# Patient Record
Sex: MALE | Race: WHITE | NOT HISPANIC OR LATINO | Employment: OTHER | URBAN - METROPOLITAN AREA
[De-identification: names, ages, dates, MRNs, and addresses within clinical notes are randomized per-mention and may not be internally consistent; named-entity substitution may affect disease eponyms.]

---

## 2017-01-09 ENCOUNTER — ALLSCRIPTS OFFICE VISIT (OUTPATIENT)
Dept: OTHER | Facility: OTHER | Age: 72
End: 2017-01-09

## 2017-01-09 DIAGNOSIS — R01.1 CARDIAC MURMUR: ICD-10-CM

## 2017-01-12 ENCOUNTER — HOSPITAL ENCOUNTER (OUTPATIENT)
Dept: NON INVASIVE DIAGNOSTICS | Facility: HOSPITAL | Age: 72
Discharge: HOME | End: 2017-01-12
Attending: INTERNAL MEDICINE
Payer: MEDICARE

## 2017-01-12 DIAGNOSIS — R01.1 CARDIAC MURMUR: ICD-10-CM

## 2017-01-12 PROCEDURE — 93306 TTE W/DOPPLER COMPLETE: CPT

## 2017-01-13 ENCOUNTER — GENERIC CONVERSION - ENCOUNTER (OUTPATIENT)
Dept: OTHER | Facility: OTHER | Age: 72
End: 2017-01-13

## 2017-12-06 ENCOUNTER — ALLSCRIPTS OFFICE VISIT (OUTPATIENT)
Dept: OTHER | Facility: OTHER | Age: 72
End: 2017-12-06

## 2017-12-06 DIAGNOSIS — I10 ESSENTIAL (PRIMARY) HYPERTENSION: ICD-10-CM

## 2017-12-06 DIAGNOSIS — E78.5 HYPERLIPIDEMIA: ICD-10-CM

## 2017-12-07 NOTE — PROGRESS NOTES
Assessment  Assessed    1  HTN (hypertension) (401 9) (I10)   2  Hyperlipidemia (272 4) (E78 5)   3  H/O pancreatic cancer (V10 09) (Z85 07)   4  Cardiac murmur (785 2) (R01 1)    Plan  Cardiac murmur, HTN (hypertension)    · EKG/ECG- POC; Status:Complete;   Done: 20ZTG8727   Perform: In Office; [phone]; Last Updated By:Carolee Hawthorne; 12/6/2017 3:01:26 PM;Ordered; For:Cardiac murmur, HTN (hypertension); Ordered By:Andrei Small;  HTN (hypertension)    · AmLODIPine Besylate 5 MG Oral Tablet; TAKE 1 TABLET DAILY   Rx By: Payal Bronson; Dispense: 90 Days ; #:90 Tablet; Refill: 3;HTN (hypertension); JANICE = N; Verified Transmission to [address] [place] [address] [address]; Last Updated By: System, [employer]; 12/6/2017 3:22:12 PM   · Follow-up visit in 3 months Evaluation and Treatment  Follow-up  Status: Complete Done: [id number]   Ordered; For: HTN (hypertension); Ordered By: Payal Bronson Performed:  Due: 97LFX2449; Last Updated By: Malena Manzanares; 12/6/2017 4:25:48 PM  HTN (hypertension), Hyperlipidemia    · (1) CBC/ PLT (NO DIFF); Status:Active; Requested for:81Ljg9250;    Perform:[facility]; ZBX:40TXZ2555; Ordered;For:HTN (hypertension), Hyperlipidemia; Ordered By:Andrei Small;   · (1) COMPREHENSIVE METABOLIC PANEL; Status:Active; Requested for:67Dre5597;    Perform:[facility]; RML:33FAC6656; Ordered;For:HTN (hypertension), Hyperlipidemia; Ordered By:Andrei Small;   · (1) LIPID PANEL, FASTING; Status:Active; Requested for:71Xgy8862;    Perform:[facility]; PHX:94ZQP2596; Ordered;For:HTN (hypertension), Hyperlipidemia; Ordered By:Small, Narpinder;   · (1) TSH; Status:Active; Requested for:96Xnp6899;    Perform:[facility]; HZS:64PZS6782; Ordered;(hypertension), Hyperlipidemia; Ordered By:Daniel Small;    Discussion/Summary  Cardiology Discussion Summary Free Text Note Form [facility]:   1  History of pancreatic cancer status post Whipple procedure  Follows up at [facility] murmur with repeat echo in January showing normal LV systolic function mild AI mild MR mild TR  No change from previous studies  Palpitations and hypertension  Seems to be pretty well controlled with low-dose metoprololDyslipidemia  Patient is on simvastatin followed up by PMD  Check fasting lipids and LFTsHistory of BPH on FlomaxHypertension  Blood pressure checked by me was around 160/80  Earlier checked by stop was around 150  Patient was started on amlodipine  Will start with 2  5 mg daily For 7 days then slowly titrate up  He will call us if there is any problems  Will check routine lab including CMP CBC and TSHcurrent Rx  Will get an echocardiogram and follow-up in 6 months  Patient's Capacity to Self-Care: Patient is able to Self-Care  Medication SE Review and Pt Understands Tx: Possible side effects of new medications were reviewed with the patient/guardian today  Counseling Documentation With Imm: The patient was counseled regarding diagnostic results,-- instructions for management,-- risk factor reductions,-- prognosis,-- patient and family education,-- importance of compliance with treatment  Chief Complaint  Chief Complaint Free Text Note Form: Karely Dotson is here today for an eleven month followup  He denies any cardiac complaints in the office today  An EKG was done  JW   Chief Complaint Chronic Condition [facility]: Patient is here today for follow up of chronic conditions described in HPI  History of Present Illness  Cardiology HPI Free Text Note Form [facility]: 79-year-old male with a past medical history significant for borderline hypertension, incomplete RBBB, pancreatic cancer status post Whipple procedure at [facility], palpitations, dyslipidemia and BPH who came for regular follow-up  He's been doing pretty well in fact he has gained around 20 pounds  Denies any chest pain  Any shortness of breath or any other significant complaint    last cardiac workup was in 2013 which shows he had normal LV function mild MR and trace AI  [facility] is [facility]  for follow-up on his [facility]ipple procedurecame for follow-up  Has been doing well  He does have history of borderline hypertension  RBBB, pancreatic cancer status post surgery at [facility]  Dyslipidemia and BPH  He has an echo done January which was reviewed  In my opinion there is only mild AI and mild MR  With normal LV systolic function  No other significant complaint      Review of Systems  Cardiology Male ROS:    Cardiac: No complaints of chest pain, no palpitations, no fainiting  Skin: No complaints of nonhealing sores or skin rash  Genitourinary: No complaints of recurrent urinary tract infections, frequent urination at night, difficult urination, blood in urine, kidney stones, loss of bladder control, no kidney or prostate problems, no erectile dysfunction  Psychological: No complaints of feeling depressed, anxiety, panic attacks, or difficulty concentrating  General: No complaints of trouble sleeping, lack of energy, fatigue, appetite changes, weight changes, fever, frequent infections, or night sweats  Respiratory: No complaints of shortness of breath, cough with sputum, or wheezing  HEENT: No complaints of serious problems, hearing problems, nose problems, throat problems, or snoring  Gastrointestinal: No complaints of liver problems, nausea, vomiting, heartburn, constipation, bloody stools, diarrhea, problems swallowing, adbominal pain, or rectal bleeding  Hematologic: No complaints of bleeding disorders, anemia, blood clots, or excessive brusing  Neurological: No complaints of numbness, tingling, dizziness, weakness, seizures, headaches, syncope or fainting, AM fatigue, daytime sleepiness, no witnessed apnea episodes  Musculoskeletal: No complaints of arthritis, back pain, or painfull swelling  Active Problems  Problems    1  Cardiac murmur (785 2) (R01 1)   2   H/O pancreatic cancer (V10 09) (Z85 07)   3  HTN (hypertension) (401 9) (I10)   4  Hyperlipidemia (272 4) (E78 5)    Past Medical History  Active Problems And Past Medical History Reviewed: The active problems and past medical history were reviewed and updated today  Surgical History  Problems    1  History of Hernia Repair  Surgical History Reviewed: The surgical history was reviewed and updated today  Family History  Father    1  Family history of CAD (coronary artery disease)  Unknown    2  Family history of peripheral vascular disease (V17 49) (Z82 49)  Family History Reviewed: The family history was reviewed and updated today  Social History  Problems    · Never a smoker   · No alcohol use  Social History Reviewed: The social history was reviewed and updated today  Current Meds   1  Adult Aspirin EC Low Strength 81 MG Oral Tablet Delayed Release; Therapy: 57ANT1166 to Recorded   2  Alfuzosin HCl ER 10 MG Oral Tablet Extended Release 24 Hour; TAKE 1 TABLET DAILY; Therapy: 71TEY3846 to Recorded   3  Simvastatin 20 MG Oral Tablet; Take 1 tablet daily; Therapy: 42DBO2602 to (Evaluate:38Muu4826) Recorded   4  Toprol XL 25 MG Oral Tablet Extended Release 24 Hour; TAKE 1 TABLET DAILY; Therapy: 56WDQ8319 to Recorded  Medication List Reviewed: The medication list was reviewed and updated today  Allergies  Medication    1  No Known Drug Allergies    Vitals  Vital Signs    Recorded: 55JAK7777 03:03PM   Heart Rate 69, Apical   Systolic 712, RUE, Sitting   Diastolic 592, RUE, Sitting   Height 6 ft    Weight 185 lb    BMI Calculated 25 09   BSA Calculated 2 06   O2 Saturation 97, RA       Physical Exam   Constitutional - General appearance: No acute distress, well appearing and well nourished  -- Alert awake oriented  Eyes - Conjunctiva and Sclera examination: Conjunctiva pink, sclera anicteric  Neck - Normal, no JVD   -- No JVP    Pulmonary - Respiratory effort: No signs of respiratory distress  -- Normal -- Auscultation of lungs: Clear to auscultation  -- Clear to auscultation  Cardiovascular - Auscultation of heart: Normal rate and rhythm, normal S1 and S2, no murmurs  -- S1-S2 regular with a 2/6 ejection for murmur -- Pedal pulses: Normal, 2+ bilaterally  -- Examination of extremities for edema and/or varicosities: Normal    Abdomen - Soft  Musculoskeletal - Gait and station: Normal gait  Skin - Skin: Normal without rashes  Skin is warm and well perfused  Neurologic - Speech normal  No focal deficits  Psychiatric - Orientation to person, place, and time: Normal -- Mood and affect: Normal       Results/Data  Diagnostic Studies Reviewed Cardio: I personally reviewed the recording/images in the office today  My interpretation follows  Stress Test: Stress test in 2013 shows maximum elective exercise stress test patient achieved 86% of his bradycardic heart rate  Echocardiogram/YOANA: Echo Doppler in 2013 shows normal LV systolic function grade 1 diastolic dysfunction and trace AI mild MRecho Doppler in January 2016 shows EF 55-60%, mild concentric left hypertrophy, mild AI, mild TR PA pressure 35 mm of mercury  Vascular imaging: Carotid Doppler done in 2014 shows no significant carotid artery stenosis bilaterally  ECG Report: EKG shows normal sinus rhythm heart rate 73 bpm this was done in April 2016  RSR pattern, probably normal wilcstq1512/06/2017  Twelve lead EKG shows normal sinus rhythm heart rate 64 beats per minute incomplete RBBB  ECHO COMPLETE WITH CONTRAST IF INDICATED [id number] 08:48AM [facility] Order Number: [id number]   - Patient Instructions: To schedule this appointment, please contact Central Scheduling at [phone]  For Keya Parham, please call [phone]       Test Name Result Flag Reference   ECHO COMPLETE WITH CONTRAST IF INDICATED (Report)       [place] [address]  [address] [facility]  [phone] Transthoracic Echocardiogram  2D, M-mode, Doppler, and Color Doppler   Study date: 2017   Patient: Melita Gerber  MR number: [id number]  Account number: [de-identified]  : 1945  Age: 73 years  Gender: Male  Status: Routine  Location: Echo lab  Height: 72 in  Weight: 184 6 lb  BP: 118/ 64 mmHg   Indications: Murmur   Diagnoses: 785 2 - CARDIAC MURMURS NEC   Sonographer: Sai Wood  Primary Physician: Cathy Lr DO  Referring Physician: Dano Vegas MD  Group: Margarita Lopez  Interpreting Physician: Yaneth Bowens DO   SUMMARY   LEFT VENTRICLE:  Systolic function was normal by visual assessment  Ejection fraction was estimated in the range of 55 % to 60 %  There were no regional wall motion abnormalities  There was mild concentric hypertrophy  Doppler parameters were consistent with abnormal left ventricular relaxation (grade 1 diastolic dysfunction)  MITRAL VALVE:  There was trace regurgitation  AORTIC VALVE:  There was no evidence for stenosis  There was mild to moderate regurgitation  TRICUSPID VALVE:  There was mild to moderate regurgitation  Pulmonary artery systolic pressure was within the normal range  HISTORY: PRIOR HISTORY: HTN, Hyperlipidemia, Pancreatic Cancer   PROCEDURE: The procedure was performed in the echo lab  This was a routine study  The transthoracic approach was used  The study included complete 2D imaging, M-mode, complete spectral Doppler, and color Doppler  The heart rate was 65 bpm,  at the start of the study  Image quality was adequate  LEFT VENTRICLE: Size was normal  Systolic function was normal by visual assessment  Ejection fraction was estimated in the range of 55 % to 60 %  There were no regional wall motion abnormalities  There was mild concentric hypertrophy  DOPPLER: Doppler parameters were consistent with abnormal left ventricular relaxation (grade 1 diastolic dysfunction)     RIGHT VENTRICLE: The size was normal  Systolic function was normal  DOPPLER: Systolic pressure was within the normal range  LEFT ATRIUM: The atrium was mildly dilated  RIGHT ATRIUM: Size was normal    MITRAL VALVE: Valve structure was normal  There was normal leaflet separation  No echocardiographic evidence for prolapse  DOPPLER: The transmitral velocity was within the normal range  There was no evidence for stenosis  There was trace  regurgitation  AORTIC VALVE: The valve was trileaflet  Leaflets exhibited normal cuspal separation and sclerosis  DOPPLER: Transaortic velocity was within the normal range  There was no evidence for stenosis  There was mild to moderate regurgitation  TRICUSPID VALVE: The valve structure was normal  There was normal leaflet separation  DOPPLER: The transtricuspid velocity was within the normal range  There was mild to moderate regurgitation  Pulmonary artery systolic pressure was within  the normal range  Estimated peak PA pressure was 36 mmHg  PULMONIC VALVE: Leaflets exhibited normal thickness, no calcification, and normal cuspal separation  DOPPLER: The transpulmonic velocity was within the normal range  There was no regurgitation  PERICARDIUM: There was no thickening  There was no pericardial effusion  AORTA: The root exhibited normal size  PULMONARY ARTERY: The size was normal  The morphology appeared normal    SYSTEM MEASUREMENT TABLES   2D mode  AoR Diam 2D: 3 5 cm  LA Diam (2D): 4 5 cm  LA/Ao (2D): 1 29  FS (2D Teich): 38 4 %  IVSd (2D): 1 04 cm  LVDEV: 84 cm³  LVESV: 26 cm³  LVIDd(2D): 4 32 cm  LVISd (2D): 2 66 cm  LVPWd (2D): 1 12 cm  SV (Teich): 58 cm³   Apical four chamber  LVEF A4C: 55 %   Apical two chamber  LA Area: 20 7 cm squared  LA Volume: 59 cm³   Unspecified Scan Mode  MV Peak A Cy: 970 mm/s  MV Peak E Cy   Mean: 831 mm/s  MVA (PHT): 3 44 cm squared  PHT: 64 ms  Max P mm[Hg]  V Max: 2470 mm/s  Vmax: 2560 mm/s  RA Area: 16 7 cm squared  RA Volume: 42 1 cm³  TAPSE: 2 7 cm   [employer] Accredited Echocardiography Laboratory   Prepared and electronically signed by   Kailash Hernandes DO  Signed 13-Jan-2017 11:38:55       Signatures   Electronically signed by : LOBITO Chadwick ; Dec  6 2017  5:09PM EST                       (Author)

## 2018-01-13 VITALS
HEART RATE: 59 BPM | DIASTOLIC BLOOD PRESSURE: 64 MMHG | OXYGEN SATURATION: 98 % | HEIGHT: 72 IN | WEIGHT: 181.38 LBS | SYSTOLIC BLOOD PRESSURE: 118 MMHG | BODY MASS INDEX: 24.57 KG/M2

## 2018-01-17 NOTE — RESULT NOTES
Message   Keep appointment     Verified Results  ECHO COMPLETE WITH CONTRAST IF INDICATED [id number] 08:48AM aMsha Rogers Order Number: [id number]    - Patient Instructions: To schedule this appointment, please contact Central Scheduling at [phone]  For Iwona Morgan, please call [phone]  Test Name Result Flag Reference   ECHO COMPLETE WITH CONTRAST IF INDICATED (Report)     [place] [address]   [address] [facility] [phone]     Transthoracic Echocardiogram   2D, M-mode, Doppler, and Color Doppler     Study date: 2017     Patient: Shaji Guzman   MR number: [id number]   Account number: [de-identified]   : 1945   Age: 73 years   Gender: Male   Status: Routine   Location: Echo lab   Height: 72 in   Weight: 184 6 lb   BP: 118/ 64 mmHg     Indications: Murmur     Diagnoses: 785 2 - CARDIAC MURMURS NEC     Sonographer: Sai Cabrera   Primary Physician: Jarrett Juares DO   Referring Physician: Domonique Manuel MD   Group: Nancy Lyman   Interpreting Physician: Robbei Rivera DO     SUMMARY     LEFT VENTRICLE:   Systolic function was normal by visual assessment  Ejection fraction was estimated in the range of 55 % to 60 %  There were no regional wall motion abnormalities  There was mild concentric hypertrophy  Doppler parameters were consistent with abnormal left ventricular relaxation (grade 1 diastolic dysfunction)  MITRAL VALVE:   There was trace regurgitation  AORTIC VALVE:   There was no evidence for stenosis  There was mild to moderate regurgitation  TRICUSPID VALVE:   There was mild to moderate regurgitation  Pulmonary artery systolic pressure was within the normal range  HISTORY: PRIOR HISTORY: HTN, Hyperlipidemia, Pancreatic Cancer     PROCEDURE: The procedure was performed in the echo lab  This was a routine study  The transthoracic approach was used   The study included complete 2D imaging, M-mode, complete spectral Doppler, and color Doppler  The heart rate was 65 bpm,   at the start of the study  Image quality was adequate  LEFT VENTRICLE: Size was normal  Systolic function was normal by visual assessment  Ejection fraction was estimated in the range of 55 % to 60 %  There were no regional wall motion abnormalities  There was mild concentric hypertrophy  DOPPLER: Doppler parameters were consistent with abnormal left ventricular relaxation (grade 1 diastolic dysfunction)  RIGHT VENTRICLE: The size was normal  Systolic function was normal  DOPPLER: Systolic pressure was within the normal range  LEFT ATRIUM: The atrium was mildly dilated  RIGHT ATRIUM: Size was normal      MITRAL VALVE: Valve structure was normal  There was normal leaflet separation  No echocardiographic evidence for prolapse  DOPPLER: The transmitral velocity was within the normal range  There was no evidence for stenosis  There was trace   regurgitation  AORTIC VALVE: The valve was trileaflet  Leaflets exhibited normal cuspal separation and sclerosis  DOPPLER: Transaortic velocity was within the normal range  There was no evidence for stenosis  There was mild to moderate regurgitation  TRICUSPID VALVE: The valve structure was normal  There was normal leaflet separation  DOPPLER: The transtricuspid velocity was within the normal range  There was mild to moderate regurgitation  Pulmonary artery systolic pressure was within   the normal range  Estimated peak PA pressure was 36 mmHg  PULMONIC VALVE: Leaflets exhibited normal thickness, no calcification, and normal cuspal separation  DOPPLER: The transpulmonic velocity was within the normal range  There was no regurgitation  PERICARDIUM: There was no thickening  There was no pericardial effusion  AORTA: The root exhibited normal size       PULMONARY ARTERY: The size was normal  The morphology appeared normal      SYSTEM MEASUREMENT TABLES     2D mode AoR Diam 2D: 3 5 cm   LA Diam (2D): 4 5 cm   LA/Ao (2D): 1 29   FS (2D Teich): 38 4 %   IVSd (2D): 1 04 cm   LVDEV: 84 cm³   LVESV: 26 cm³   LVIDd(2D): 4 32 cm   LVISd (2D): 2 66 cm   LVPWd (2D): 1 12 cm   SV (Teich): 58 cm³     Apical four chamber   LVEF A4C: 55 %     Apical two chamber   LA Area: 20 7 cm squared   LA Volume: 59 cm³     Unspecified Scan Mode   MV Peak A Cy: 970 mm/s   MV Peak E Cy   Mean: 831 mm/s   MVA (PHT): 3 44 cm squared   PHT: 64 ms   Max P mm[Hg]   V Max: 2470 mm/s   Vmax: 2560 mm/s   RA Area: 16 7 cm squared   RA Volume: 42 1 cm³   TAPSE: 2 7 cm     [facility]redited [facility]     Prepared and electronically signed by     Eduardo Bullock DO   Signed 2017 11:38:55

## 2018-01-22 VITALS
BODY MASS INDEX: 25.06 KG/M2 | HEART RATE: 69 BPM | OXYGEN SATURATION: 97 % | SYSTOLIC BLOOD PRESSURE: 148 MMHG | WEIGHT: 185 LBS | HEIGHT: 72 IN | DIASTOLIC BLOOD PRESSURE: 102 MMHG

## 2018-04-15 PROBLEM — R01.1 CARDIAC MURMUR: Status: ACTIVE | Noted: 2017-01-09

## 2018-04-15 NOTE — PROGRESS NOTES
Progress Note - Cardiology Office  Tanisha Barr 81 y o  male MRN: [id number]  : 1945  Encounter: [id number]      Assessment:     1  Essential hypertension    2  Cardiac murmur    3  Hypercholesterolemia    4  Benign prostatic hyperplasia with urinary obstruction    5  Chest pain, unspecified type        Discussion Summary and Plan:  1  History of pancreatic cancer status post Whipple procedure  Follows up at [facility]  Mass was found to be benign  Patient has now lost any weight  Clinically he is doing much better  2  Heart murmur with repeat echo in January showing normal LV systolic function mild AI mild MR mild TR  No change from previous studies  3  Palpitations  Seems to be pretty well controlled with low-dose metoprolol  Continue same medications  Repeat labs reviewed hemoglobin electrolytes acceptable  4  Dyslipidemia  Patient is on simvastatin followed up by PMD   Labs reviewed Repea cholesterol is acceptable  TSH is acceptable t  5  History of BPH on Flomax  6  Hypertension  Much better since patient is on amlodipine  Continue same medications  7   Atypical chest pain  Patient more of left shoulder pain  He is very concerned about it  He will be scheduled for exercise stress test     Counseling :  A description of the counseling  Regular exercise, education about pathophysiology of coronary artery disease discussed with patient at length  Patient's ability to self care: Yes  Medication side effect reviewed with patient in detail and all their questions answered to their satisfaction  HPI :     Tanisha Barr is a 81y o  year old male who came for follow up  Patient has with a past medical history significant for borderline hypertension, incomplete RBBB, pancreatic cancer status post Whipple procedure at [facility], palpitations, dyslipidemia and BPH who came for regular follow-up  He's been doing pretty well in fact he has gained around 20 pounds  Denies any chest pain  Any shortness of breath or any other significant complaint  last cardiac workup was in 2013 which shows he had normal LV function mild MR and trace AI  He is scheduled to go to [facility] for follow-up on his Whipple procedure  /06/2017came for follow-up  Has been doing well  He does have history of borderline hypertension  RBBB, pancreatic cancer status post surgery at [facility]  Dyslipidemia and BPH  He has an echo done January which was reviewed  In my opinion there is only mild AI and mild MR  With normal LV systolic function  No other significant complaint   04/18/2018  Will came of follow-up  He is doing much better  Occasionally get left-sided shoulder pain  He had past medical history significant for hypertension, hyper lipidemia, history of pancreatic mass status post surgery at [facility] later informed out to be a benign lesion  Has no other significant cardiovascular complaint  He denies any significant chest pain but is shoulder pain mostly comes sometime while he is resting  He is concerned about it  No fever no chills no nausea no vomiting no PND no orthopnea no leg swelling no other significant complaint  Review of Systems   Constitutional: Negative for activity change, chills, diaphoresis, fever and unexpected weight change  HENT: Negative for congestion  Eyes: Negative for discharge and redness  Respiratory: Negative for cough, chest tightness, shortness of breath and wheezing  Cardiovascular: Positive for chest pain  Negative for palpitations and leg swelling  Atypical mostly left shoulder pain   Gastrointestinal: Negative for abdominal pain, diarrhea and nausea  Endocrine: Negative  Genitourinary: Negative for decreased urine volume and urgency  Musculoskeletal: Positive for arthralgias  Negative for back pain and gait problem  Skin: Negative for rash and wound  Allergic/Immunologic: Negative      Neurological: Negative for dizziness, seizures, syncope, weakness, light-headedness and headaches  Hematological: Negative  Psychiatric/Behavioral: Negative for agitation and confusion  The patient is not nervous/anxious  Historical Information   Past Medical History:   Diagnosis Date    Hyperlipidemia     Hypertension      Past Surgical History:   Procedure Laterality Date    HERNIA REPAIR       History   Alcohol Use    Yes     History   Drug Use No     History   Smoking Status    Never Smoker   Smokeless Tobacco    Never Used     Family History: History reviewed  No pertinent family history  Meds/Allergies     No Known Allergies    Current Outpatient Prescriptions:     alfuzosin (UROXATRAL) 10 mg 24 hr tablet, Take 10 mg by mouth daily  , Disp: , Rfl:     amLODIPine (NORVASC) 5 mg tablet, Take 5 mg by mouth daily, Disp: , Rfl:     aspirin 81 mg chewable tablet, Chew 162 mg daily  , Disp: , Rfl:     lisinopril (ZESTRIL) 10 mg tablet, Take 10 mg by mouth daily  , Disp: , Rfl:     metoprolol tartrate (LOPRESSOR) 25 mg tablet, Take 25 mg by mouth daily  , Disp: , Rfl:     simvastatin (ZOCOR) 20 mg tablet, Take 20 mg by mouth daily at bedtime  , Disp: , Rfl:     Vitals: Blood pressure 124/72, pulse 73, height 6' (1 829 m), weight 83 6 kg (184 lb 3 2 oz), SpO2 96 %  Body mass index is 24 98 kg/m²    Vitals:    04/18/18 0952   Weight: 83 6 kg (184 lb 3 2 oz)     BP Readings from Last 3 Encounters:   04/18/18 124/72   12/06/17 (!) 148/102   01/09/17 118/64         Physical Exam    Neurologic:  Alert & oriented x 3, no new focal deficits, Not in any acute distress,  Constitutional:  Well developed, well nourished, non-toxic appearance   Eyes:  Pupil equal and reacting to light, conjunctiva normal, No JVP, No LNP   HENT:  Atraumatic, oropharynx moist, Neck- normal range of motion, no tenderness,  Neck supple   Respiratory:  Bilateral air entry, mostly clear to auscultation  Cardiovascular: S1-S2 regular with a 2/6 ejection systolic murmur   GI:  Soft, nondistended, normal bowel sounds, nontender, no hepatosplenomegaly appreciated  Musculoskeletal:  No edema, no tenderness, no deformities  Skin:  Well hydrated, no rash   Lymphatic:  No lymphadenopathy noted   Extremities:  No edema and distal pulses are present    Diagnostic Studies Review Cardio:  Stress Test: Stress test in  shows maximum elective exercise stress test patient achieved 86% of his bradycardic heart rate  Echocardiogram/YOANA: Echo Doppler in  shows normal LV systolic function grade 1 diastolic dysfunction and trace AI mild MR echo Doppler in 2016 shows EF 55-60%, mild concentric left hypertrophy, mild AI, mild TR PA pressure 35 mm of mercury  Vascular imaging: Carotid Doppler done in  shows no significant carotid artery stenosis bilaterally  ECG Report: EKG shows normal sinus rhythm heart rate 73 bpm this was done in 2016  RSR pattern, probably normal [id number]  Twelve lead EKG shows normal sinus rhythm heart rate 64 beats per minute incomplete RBBB  Cardiac testing:   Results for orders placed during the hospital encounter of 17   Echo complete with contrast if indicated    Narrative THE [place] [id number]  [phone]    Transthoracic Echocardiogram  2D, M-mode, Doppler, and Color Doppler    Study date:  2017    Patient: Melita Gerber  MR number: [id number]  Account number: [de-identified]  : 1945  Age: 73 years  Gender: Male  Status: Routine  Location: Echo lab  Height: 72 in  Weight: 184 6 lb  BP: 118/ 64 mmHg    Indications: Murmur    Diagnoses: 785 2 - CARDIAC MURMURS NEC    Sonographer:  Sai Wood  Primary Physician:  Cathy Lr DO  Referring Physician:  Dano Vegas MD  Group:  Margarita Lopez  Interpreting Physician:  Yaneth Bowens DO    SUMMARY    LEFT VENTRICLE:  Systolic function was normal by visual assessment  Ejection fraction was estimated in the range of 55 % to 60 %  There were no regional wall motion abnormalities  There was mild concentric hypertrophy  Doppler parameters were consistent with abnormal left ventricular relaxation (grade 1 diastolic dysfunction)  MITRAL VALVE:  There was trace regurgitation  AORTIC VALVE:  There was no evidence for stenosis  There was mild to moderate regurgitation  TRICUSPID VALVE:  There was mild to moderate regurgitation  Pulmonary artery systolic pressure was within the normal range  HISTORY: PRIOR HISTORY: HTN, Hyperlipidemia, Pancreatic Cancer    PROCEDURE: The procedure was performed in the echo lab  This was a routine study  The transthoracic approach was used  The study included complete 2D imaging, M-mode, complete spectral Doppler, and color Doppler  The heart rate was 65 bpm,  at the start of the study  Image quality was adequate  LEFT VENTRICLE: Size was normal  Systolic function was normal by visual assessment  Ejection fraction was estimated in the range of 55 % to 60 %  There were no regional wall motion abnormalities  There was mild concentric hypertrophy  DOPPLER: Doppler parameters were consistent with abnormal left ventricular relaxation (grade 1 diastolic dysfunction)  RIGHT VENTRICLE: The size was normal  Systolic function was normal  DOPPLER: Systolic pressure was within the normal range  LEFT ATRIUM: The atrium was mildly dilated  RIGHT ATRIUM: Size was normal     MITRAL VALVE: Valve structure was normal  There was normal leaflet separation  No echocardiographic evidence for prolapse  DOPPLER: The transmitral velocity was within the normal range  There was no evidence for stenosis  There was trace  regurgitation  AORTIC VALVE: The valve was trileaflet  Leaflets exhibited normal cuspal separation and sclerosis  DOPPLER: Transaortic velocity was within the normal range  There was no evidence for stenosis   There was mild to moderate regurgitation  TRICUSPID VALVE: The valve structure was normal  There was normal leaflet separation  DOPPLER: The transtricuspid velocity was within the normal range  There was mild to moderate regurgitation  Pulmonary artery systolic pressure was within  the normal range  Estimated peak PA pressure was 36 mmHg  PULMONIC VALVE: Leaflets exhibited normal thickness, no calcification, and normal cuspal separation  DOPPLER: The transpulmonic velocity was within the normal range  There was no regurgitation  PERICARDIUM: There was no thickening  There was no pericardial effusion  AORTA: The root exhibited normal size  PULMONARY ARTERY: The size was normal  The morphology appeared normal     SYSTEM MEASUREMENT TABLES    2D mode  AoR Diam 2D: 3 5 cm  LA Diam (2D): 4 5 cm  LA/Ao (2D): 1 29  FS (2D Teich): 38 4 %  IVSd (2D): 1 04 cm  LVDEV: 84 cm³  LVESV: 26 cm³  LVIDd(2D): 4 32 cm  LVISd (2D): 2 66 cm  LVPWd (2D): 1 12 cm  SV (Teich): 58 cm³    Apical four chamber  LVEF A4C: 55 %    Apical two chamber  LA Area: 20 7 cm squared  LA Volume: 59 cm³    Unspecified Scan Mode  MV Peak A Cy: 970 mm/s  MV Peak E Cy   Mean: 831 mm/s  MVA (PHT): 3 44 cm squared  PHT: 64 ms  Max P mm[Hg]  V Max: 2470 mm/s  Vmax: 2560 mm/s  RA Area: 16 7 cm squared  RA Volume: 42 1 cm³  TAPSE: 2 7 cm    [facility] Accredited Echocardiography [facility]    Prepared and electronically signed by    Yaneth Bowens DO  Signed 2017 11:38:55       Lab Review   Lab Results   Component Value Date    WBC 4 10 (L) 2016    HGB 14 9 2016    HCT 43 8 2016    MCV 92 2016    RDW 12 9 2016     2016     BMP:  Lab Results   Component Value Date     2016    K 3 9 2016     2016    CO2 30 2016    ANIONGAP 6 2016    BUN 22 2016    CREATININE 0 80 2016    GLUCOSE 110 2016    CALCIUM 8 3 2016    EGFR >60 0 2016     LFT:  Lab Results   Component Value Date    AST 26 04/21/2016    ALT 41 04/21/2016    ALKPHOS 61 04/21/2016    PROT 6 7 04/21/2016    BILITOT 0 40 04/21/2016     Repeat labs done on 04/10/2018 shows hemoglobin 14 17 hematocrit 42, BUN 18 creatinine 0 76, electrolytes were okay, LFTs were within normal range, cholesterol 150 HDL 59 LDL 79, TSH 1 23  Dr Lilliam Freitas MD [facility] [place] [facility]      "This note has been constructed using a voice recognition system  Therefore there may be syntax, spelling, and/or grammatical errors   Please call if you have any questions  "

## 2018-04-18 ENCOUNTER — OFFICE VISIT (OUTPATIENT)
Dept: CARDIOLOGY CLINIC | Facility: CLINIC | Age: 73
End: 2018-04-18
Payer: MEDICARE

## 2018-04-18 VITALS
SYSTOLIC BLOOD PRESSURE: 124 MMHG | HEART RATE: 73 BPM | HEIGHT: 72 IN | WEIGHT: 184.2 LBS | DIASTOLIC BLOOD PRESSURE: 72 MMHG | OXYGEN SATURATION: 96 % | BODY MASS INDEX: 24.95 KG/M2

## 2018-04-18 DIAGNOSIS — I10 ESSENTIAL HYPERTENSION: ICD-10-CM

## 2018-04-18 DIAGNOSIS — R07.9 CHEST PAIN, UNSPECIFIED TYPE: ICD-10-CM

## 2018-04-18 DIAGNOSIS — N13.8 BENIGN PROSTATIC HYPERPLASIA WITH URINARY OBSTRUCTION: ICD-10-CM

## 2018-04-18 DIAGNOSIS — E78.00 HYPERCHOLESTEROLEMIA: ICD-10-CM

## 2018-04-18 DIAGNOSIS — R01.1 CARDIAC MURMUR: ICD-10-CM

## 2018-04-18 DIAGNOSIS — N40.1 BENIGN PROSTATIC HYPERPLASIA WITH URINARY OBSTRUCTION: ICD-10-CM

## 2018-04-18 PROCEDURE — 99214 OFFICE O/P EST MOD 30 MIN: CPT | Performed by: INTERNAL MEDICINE

## 2018-04-18 RX ORDER — AMLODIPINE BESYLATE 5 MG/1
5 TABLET ORAL DAILY
COMMUNITY
Start: 2017-12-06 | End: 2018-12-04 | Stop reason: SDUPTHER

## 2018-05-02 ENCOUNTER — HOSPITAL ENCOUNTER (OUTPATIENT)
Dept: NON INVASIVE DIAGNOSTICS | Facility: HOSPITAL | Age: 73
Discharge: HOME | End: 2018-05-02
Attending: INTERNAL MEDICINE
Payer: MEDICARE

## 2018-05-02 DIAGNOSIS — R07.9 CHEST PAIN, UNSPECIFIED TYPE: ICD-10-CM

## 2018-05-02 DIAGNOSIS — I10 ESSENTIAL HYPERTENSION: ICD-10-CM

## 2018-05-02 PROCEDURE — 93017 CV STRESS TEST TRACING ONLY: CPT

## 2018-05-03 ENCOUNTER — TELEPHONE (OUTPATIENT)
Dept: CARDIOLOGY CLINIC | Facility: CLINIC | Age: 73
End: 2018-05-03

## 2018-05-03 LAB
CHEST PAIN STATEMENT: NORMAL
MAX DIASTOLIC BP: 74 MMHG
MAX HEART RATE: 131 BPM
MAX PREDICTED HEART RATE: 150 BPM
MAX. SYSTOLIC BP: 170 MMHG
PROTOCOL NAME: NORMAL
TARGET HR FORMULA: NORMAL
TEST INDICATION: NORMAL
TIME IN EXERCISE PHASE: NORMAL

## 2018-05-03 PROCEDURE — 93016 CV STRESS TEST SUPVJ ONLY: CPT | Performed by: INTERNAL MEDICINE

## 2018-05-03 PROCEDURE — 93018 CV STRESS TEST I&R ONLY: CPT | Performed by: INTERNAL MEDICINE

## 2018-05-03 NOTE — TELEPHONE ENCOUNTER
----- Message from Aime Mcdonald MD sent at 5/3/2018 11:32 AM EDT -----  Pt's Patient's stress test is normal  Please call patient with the result  Patient can keep regular appointment

## 2018-05-03 NOTE — PROGRESS NOTES
Pt's Patient's stress test is normal  Please call patient with the result  Patient can keep regular appointment

## 2018-10-15 NOTE — PROGRESS NOTES
Progress Note - Cardiology Office  Markus Cervantes 72 y o  male MRN: [id number]  : 1945  Encounter: [id number]      Assessment:     1  Essential hypertension    2  Cardiac murmur    3  Chest pain, unspecified type    4  Hypercholesterolemia    5  Benign prostatic hyperplasia with urinary obstruction    6  Palpitation        Discussion Summary and Plan:  1  History of pancreatic cancer status post Whipple procedure  Follows up at [facility]  Mass was found to be benign  Patient has now lost any weight  2    Cardiac murmur  Patient has mild AI and mild MR  Echo reviewed  Continue current Rx  3  Palpitations  Seems to be pretty well controlled with low-dose metoprolol  Advised to take 12 5 milligram twice a day  4  Dyslipidemia  Patient is on simvastatin followed up by PMD   Labs reviewed Repea cholesterol is acceptable  TSH is acceptable   5  History of BPH on Flomax  6  Hypertension  Much better since patient is on amlodipine  Repeat blood pressure checked by me was acceptable  7   Atypical chest pain and shoulder pain  Stress test was normal  Discussed with patient at length  Counseling :  A description of the counseling  Regular exercise, education about pathophysiology of coronary artery disease discussed with patient at length  Patient's ability to self care: Yes  Medication side effect reviewed with patient in detail and all their questions answered to their satisfaction  HPI :     Markus Cervantes is a 72y o  year old male who came for follow up  Patient has with a past medical history significant for borderline hypertension, incomplete RBBB, pancreatic cancer status post Whipple procedure at [facility], palpitations, dyslipidemia and BPH who came for regular follow-up  He's been doing pretty well in fact he has gained around 20 pounds  Denies any chest pain  Any shortness of breath or any other significant complaint    last cardiac workup was in  which shows he had normal LV function mild MR and trace AI  He is scheduled to go to [facility] for follow-up on his Whipple procedure    04/18/2018  Will came of follow-up  He is doing much better  Occasionally get left-sided shoulder pain  He had past medical history significant for hypertension, hyper lipidemia, history of pancreatic mass status post surgery at [facility] later informed out to be a benign lesion  Has no other significant cardiovascular complaint  He denies any significant chest pain but is shoulder pain mostly comes sometime while he is resting  He is concerned about it  No fever no chills no nausea no vomiting no PND no orthopnea no leg swelling no other significant complaint  10/16/2018  Above reviewed  Patient is feeling better  He went to [facility] and he has no new issues  He had a Whipple procedure and pancreatic cancer was found to be benign  He is maintaining his weight  Blood pressure is acceptable  It was little high on arrival he has been very active  No chest pain no dizziness no lightheadedness no nausea no vomiting no other significant issues  Review of Systems   Constitutional: Negative for activity change, chills, diaphoresis, fever and unexpected weight change  HENT: Negative for congestion  Eyes: Negative for discharge and redness  Respiratory: Negative for cough, chest tightness, shortness of breath and wheezing  Cardiovascular: Negative for palpitations and leg swelling  Gastrointestinal: Negative for abdominal pain, diarrhea and nausea  Endocrine: Negative  Genitourinary: Negative for decreased urine volume and urgency  Musculoskeletal: Positive for arthralgias  Negative for back pain and gait problem  Skin: Negative for rash and wound  Allergic/Immunologic: Negative  Neurological: Negative for dizziness, seizures, syncope, weakness, light-headedness and headaches  Hematological: Negative      Psychiatric/Behavioral: Negative for agitation and confusion  The patient is nervous/anxious  Historical Information   Past Medical History:   Diagnosis Date    Hyperlipidemia     Hypertension      Past Surgical History:   Procedure Laterality Date    HERNIA REPAIR       History   Alcohol Use    Yes     History   Drug Use No     History   Smoking Status    Never Smoker   Smokeless Tobacco    Never Used     Family History:   Family History   Problem Relation Age of Onset    Hypertension Mother     Hypertension Father        Meds/Allergies     No Known Allergies    Current Outpatient Prescriptions:     alfuzosin (UROXATRAL) 10 mg 24 hr tablet, Take 10 mg by mouth daily  , Disp: , Rfl:     amLODIPine (NORVASC) 5 mg tablet, Take 5 mg by mouth daily, Disp: , Rfl:     aspirin 81 mg chewable tablet, Chew 81 mg daily  , Disp: , Rfl:     metoprolol tartrate (LOPRESSOR) 25 mg tablet, Take 25 mg by mouth daily  , Disp: , Rfl:     simvastatin (ZOCOR) 20 mg tablet, Take 20 mg by mouth daily at bedtime  , Disp: , Rfl:     Vitals: Blood pressure 134/74, pulse 72, weight 82 1 kg (181 lb), SpO2 97 %  Body mass index is 24 55 kg/m²  Vitals:    10/18/18 1637   Weight: 82 1 kg (181 lb)     BP Readings from Last 3 Encounters:   10/18/18 134/74   04/18/18 124/72   12/06/17 (!) 148/102         Physical Exam   Constitutional: He is oriented to person, place, and time  He appears well-developed  No distress  HENT:   Head: Normocephalic and atraumatic  Eyes: Pupils are equal, round, and reactive to light  Neck: Normal range of motion  Neck supple  No JVD present  No thyromegaly present  Cardiovascular: Normal rate and regular rhythm  Murmur heard  S1-S2 regular with a 2/6 ejection systolic murmur  Pulmonary/Chest: Effort normal and breath sounds normal  No respiratory distress  He has no wheezes  He has no rales  Abdominal: Soft  Bowel sounds are normal  He exhibits no distension  There is no tenderness  There is no rebound  Musculoskeletal: Normal range of motion  He exhibits no edema or tenderness  Neurological: He is alert and oriented to person, place, and time  Skin: Skin is warm and dry  He is not diaphoretic  Psychiatric: He has a normal mood and affect  His behavior is normal  Judgment normal      Diagnostic Studies Review Cardio:  Stress Test: Stress test in  shows maximum elective exercise stress test patient achieved 86% of his bradycardic heart rate  Echocardiogram/YOANA: Echo Doppler in  shows normal LV systolic function grade 1 diastolic dysfunction and trace AI mild MR echo Doppler in 2016 shows EF 55-60%, mild concentric left hypertrophy, mild AI, mild TR PA pressure 35 mm of mercury  Vascular imaging: Carotid Doppler done in  shows no significant carotid artery stenosis bilaterally  ECG Report: EKG shows normal sinus rhythm heart rate 73 bpm this was done in 2016  RSR pattern, probably normal [id number]  Twelve lead EKG shows normal sinus rhythm heart rate 64 beats per minute incomplete RBBB  Cardiac testing:   Results for orders placed during the hospital encounter of 17   Echo complete with contrast if indicated    Narrative [place] [address]  [address] [facility] [place]   [phone]    Transthoracic Echocardiogram  2D, M-mode, Doppler, and Color Doppler    Study date:  2017    Patient: Wellington Jimenez  MR number: [id number]  Account number: [de-identified]  : 1945  Age: 73 years  Gender: Male  Status: Routine  Location: Echo lab  Height: 72 in  Weight: 184 6 lb  BP: 118/ 64 mmHg    Indications: Murmur    Diagnoses: 785 2 - CARDIAC MURMURS NEC    Sonographer:  Sai Richmond  Primary Physician:  Nic Dixon DO  Referring Physician:  Charlene Cuellar MD  Group:  Iman Leggett  Interpreting Physician:  Jeancarlos Alvarez DO    SUMMARY    LEFT VENTRICLE:  Systolic function was normal by visual assessment   Ejection fraction was estimated in the range of 55 % to 60 %  There were no regional wall motion abnormalities  There was mild concentric hypertrophy  Doppler parameters were consistent with abnormal left ventricular relaxation (grade 1 diastolic dysfunction)  MITRAL VALVE:  There was trace regurgitation  AORTIC VALVE:  There was no evidence for stenosis  There was mild to moderate regurgitation  TRICUSPID VALVE:  There was mild to moderate regurgitation  Pulmonary artery systolic pressure was within the normal range  HISTORY: PRIOR HISTORY: HTN, Hyperlipidemia, Pancreatic Cancer    PROCEDURE: The procedure was performed in the echo lab  This was a routine study  The transthoracic approach was used  The study included complete 2D imaging, M-mode, complete spectral Doppler, and color Doppler  The heart rate was 65 bpm,  at the start of the study  Image quality was adequate  LEFT VENTRICLE: Size was normal  Systolic function was normal by visual assessment  Ejection fraction was estimated in the range of 55 % to 60 %  There were no regional wall motion abnormalities  There was mild concentric hypertrophy  DOPPLER: Doppler parameters were consistent with abnormal left ventricular relaxation (grade 1 diastolic dysfunction)  RIGHT VENTRICLE: The size was normal  Systolic function was normal  DOPPLER: Systolic pressure was within the normal range  LEFT ATRIUM: The atrium was mildly dilated  RIGHT ATRIUM: Size was normal     MITRAL VALVE: Valve structure was normal  There was normal leaflet separation  No echocardiographic evidence for prolapse  DOPPLER: The transmitral velocity was within the normal range  There was no evidence for stenosis  There was trace  regurgitation  AORTIC VALVE: The valve was trileaflet  Leaflets exhibited normal cuspal separation and sclerosis  DOPPLER: Transaortic velocity was within the normal range  There was no evidence for stenosis   There was mild to moderate regurgitation  TRICUSPID VALVE: The valve structure was normal  There was normal leaflet separation  DOPPLER: The transtricuspid velocity was within the normal range  There was mild to moderate regurgitation  Pulmonary artery systolic pressure was within  the normal range  Estimated peak PA pressure was 36 mmHg  PULMONIC VALVE: Leaflets exhibited normal thickness, no calcification, and normal cuspal separation  DOPPLER: The transpulmonic velocity was within the normal range  There was no regurgitation  PERICARDIUM: There was no thickening  There was no pericardial effusion  AORTA: The root exhibited normal size  PULMONARY ARTERY: The size was normal  The morphology appeared normal     SYSTEM MEASUREMENT TABLES    2D mode  AoR Diam 2D: 3 5 cm  LA Diam (2D): 4 5 cm  LA/Ao (2D): 1 29  FS (2D Teich): 38 4 %  IVSd (2D): 1 04 cm  LVDEV: 84 cm³  LVESV: 26 cm³  LVIDd(2D): 4 32 cm  LVISd (2D): 2 66 cm  LVPWd (2D): 1 12 cm  SV (Teich): 58 cm³    Apical four chamber  LVEF A4C: 55 %    Apical two chamber  LA Area: 20 7 cm squared  LA Volume: 59 cm³    Unspecified Scan Mode  MV Peak A Cy: 970 mm/s  MV Peak E Cy  Mean: 831 mm/s  MVA (PHT): 3 44 cm squared  PHT: 64 ms  Max P mm[Hg]  V Max: 2470 mm/s  Vmax: 2560 mm/s  RA Area: 16 7 cm squared  RA Volume: 42 1 cm³  TAPSE: 2 7 cm    [facility] Accredited Echocardiography Laboratory    Prepared and electronically signed by    Douglas Augustin DO  Signed 2017 11:38:55       Lab Review   Lab Results   Component Value Date    WBC 4 10 (L) 2016    HGB 14 9 2016    HCT 43 8 2016    MCV 92 2016    RDW 12 9 2016     2016         Repeat labs done on 04/10/2018 shows hemoglobin 14 17 hematocrit 42, BUN 18 creatinine 0 76, electrolytes were okay, LFTs were within normal range, cholesterol 150 HDL 59 LDL 79, TSH 1 23      Dr Bernarda Mcneil MD [facility]      "This note has been constructed using a voice recognition system  Therefore there may be syntax, spelling, and/or grammatical errors   Please call if you have any questions  "

## 2018-10-18 ENCOUNTER — OFFICE VISIT (OUTPATIENT)
Dept: CARDIOLOGY CLINIC | Facility: CLINIC | Age: 73
End: 2018-10-18
Payer: MEDICARE

## 2018-10-18 VITALS
SYSTOLIC BLOOD PRESSURE: 134 MMHG | BODY MASS INDEX: 24.55 KG/M2 | OXYGEN SATURATION: 97 % | WEIGHT: 181 LBS | DIASTOLIC BLOOD PRESSURE: 74 MMHG | HEART RATE: 72 BPM

## 2018-10-18 DIAGNOSIS — R01.1 CARDIAC MURMUR: ICD-10-CM

## 2018-10-18 DIAGNOSIS — R07.9 CHEST PAIN, UNSPECIFIED TYPE: ICD-10-CM

## 2018-10-18 DIAGNOSIS — R00.2 PALPITATION: ICD-10-CM

## 2018-10-18 DIAGNOSIS — I10 ESSENTIAL HYPERTENSION: ICD-10-CM

## 2018-10-18 DIAGNOSIS — N40.1 BENIGN PROSTATIC HYPERPLASIA WITH URINARY OBSTRUCTION: ICD-10-CM

## 2018-10-18 DIAGNOSIS — E78.00 HYPERCHOLESTEROLEMIA: ICD-10-CM

## 2018-10-18 DIAGNOSIS — N13.8 BENIGN PROSTATIC HYPERPLASIA WITH URINARY OBSTRUCTION: ICD-10-CM

## 2018-10-18 PROCEDURE — 99214 OFFICE O/P EST MOD 30 MIN: CPT | Performed by: INTERNAL MEDICINE

## 2018-12-04 DIAGNOSIS — I10 ESSENTIAL HYPERTENSION: Primary | ICD-10-CM

## 2018-12-04 RX ORDER — AMLODIPINE BESYLATE 5 MG/1
TABLET ORAL
Qty: 90 TABLET | Refills: 3 | Status: SHIPPED | OUTPATIENT
Start: 2018-12-04 | End: 2019-10-29 | Stop reason: SDUPTHER

## 2019-04-08 ENCOUNTER — TRANSCRIBE ORDERS (OUTPATIENT)
Dept: ADMINISTRATIVE | Facility: HOSPITAL | Age: 74
End: 2019-04-08

## 2019-04-08 ENCOUNTER — APPOINTMENT (OUTPATIENT)
Dept: RADIOLOGY | Facility: CLINIC | Age: 74
End: 2019-04-08
Payer: COMMERCIAL

## 2019-04-08 DIAGNOSIS — R07.81 RIB PAIN ON RIGHT SIDE: Primary | ICD-10-CM

## 2019-04-08 DIAGNOSIS — R07.81 RIB PAIN ON RIGHT SIDE: ICD-10-CM

## 2019-04-08 PROCEDURE — 71046 X-RAY EXAM CHEST 2 VIEWS: CPT

## 2019-04-08 PROCEDURE — 71100 X-RAY EXAM RIBS UNI 2 VIEWS: CPT

## 2019-04-26 ENCOUNTER — OFFICE VISIT (OUTPATIENT)
Dept: CARDIOLOGY CLINIC | Facility: CLINIC | Age: 74
End: 2019-04-26
Payer: COMMERCIAL

## 2019-04-26 VITALS
BODY MASS INDEX: 24.24 KG/M2 | HEIGHT: 72 IN | SYSTOLIC BLOOD PRESSURE: 120 MMHG | WEIGHT: 179 LBS | OXYGEN SATURATION: 97 % | HEART RATE: 68 BPM | DIASTOLIC BLOOD PRESSURE: 80 MMHG

## 2019-04-26 DIAGNOSIS — I10 ESSENTIAL HYPERTENSION: ICD-10-CM

## 2019-04-26 DIAGNOSIS — E78.00 HYPERCHOLESTEROLEMIA: ICD-10-CM

## 2019-04-26 DIAGNOSIS — R01.1 CARDIAC MURMUR: ICD-10-CM

## 2019-04-26 DIAGNOSIS — R00.2 PALPITATION: ICD-10-CM

## 2019-04-26 PROCEDURE — 93000 ELECTROCARDIOGRAM COMPLETE: CPT | Performed by: INTERNAL MEDICINE

## 2019-04-26 PROCEDURE — 99214 OFFICE O/P EST MOD 30 MIN: CPT | Performed by: INTERNAL MEDICINE

## 2019-10-26 NOTE — PROGRESS NOTES
Progress Note - Cardiology Office  Toyn Segovia 69 y o  male MRN: [id number]  : 1945  Encounter: [id number]      Assessment:     1  Essential hypertension    2  Cardiac murmur    3  Palpitation    4  Hypercholesterolemia        Discussion Summary and Plan:  1  History of pancreatic cancer status post Whipple procedure  Patient regularly follows up at [facility]  He follows them about every 2 years  His weight has been stable  2    Essential hypertension  Pretty well controlled with amlodipine and low-dose metoprolol  It is helping with the palpitations also  Prescriptions renewed    3  Palpitations  Not much of a problem now as he is taking metoprolol  Heart rate is acceptable  4  Dyslipidemia  Continue simvastatin 20 milligram   Tolerating very well  He has recently blood test done at [facility] liver enzymes were acceptable  Labs from 2019 reviewed  5  History of BPH on Flomax  He is tolerating it very well    6  Cardiac murmur  Patient has  2/6 ejection systolic murmur  Echo shows no significant valvular disease  Patient was recently admitted to [facility] with infection and was found to have babeosis and later on needed urgent surgery for incarcerated hernia  Not doing well  Counseling :  A description of the counseling  Regular exercise, education about pathophysiology of coronary artery disease discussed with patient at length  Patient's ability to self care: Yes  Medication side effect reviewed with patient in detail and all their questions answered to their satisfaction  HPI :     Tony Segovia is a 69y o  year old male who came for follow up  Patient has with a past medical history significant for borderline hypertension, incomplete RBBB, pancreatic cancer status post Whipple procedure at [facility], palpitations, dyslipidemia and BPH who came for regular follow-up   He's been doing pretty well in fact he has gained around 20 pounds  Denies any chest pain  Any shortness of breath or any other significant complaint  last cardiac workup was in 2013 which shows he had normal LV function mild MR and trace AI  He is scheduled to go to [facility] for follow-up on his Whipple procedure      10/29/2019  Above reviewed  Patient came for follow-up  He had very event for summer  In August he was admitted to a hospital with infection was found to have bed Babeosis and later on he developed incarcerated hernia where he needed emergency surgery  He has past medical history significant for Whipple procedure for pancreatic issues, essential hypertension, dyslipidemia  He is doing well from cardiac point of view and has no cardiac issues throughout all this episodes  No chest pain no shortness of breath today heart rate 71 beats per minute EKG shows no significant ST changes  No nausea no vomiting no PND no orthopnea no other issues at this time  Review of Systems   Constitutional: Negative for activity change, chills, diaphoresis, fever and unexpected weight change  Was admitted to [facility] with babeosis   HENT: Negative for congestion  Eyes: Negative for discharge and redness  Respiratory: Negative for cough, chest tightness, shortness of breath and wheezing  Cardiovascular: Positive for palpitations  Negative for chest pain and leg swelling  Occasional palpitation much better than before   Gastrointestinal: Negative for abdominal pain, diarrhea and nausea  Recently had incarcerated hernia surgery   Endocrine: Negative  Genitourinary: Negative for decreased urine volume and urgency  Musculoskeletal: Negative  Negative for arthralgias, back pain and gait problem  Skin: Negative for rash and wound  Allergic/Immunologic: Negative  Neurological: Negative for dizziness, seizures, syncope, weakness, light-headedness and headaches  Hematological: Negative  Psychiatric/Behavioral: Negative for agitation and confusion  The patient is not nervous/anxious  Has other problem as mentioned above    Historical Information   Past Medical History:   Diagnosis Date    Hyperlipidemia     Hypertension      Past Surgical History:   Procedure Laterality Date    HERNIA REPAIR       Social History     Substance and Sexual Activity   Alcohol Use Yes    Alcohol/week: 10 0 standard drinks    Types: 10 Cans of beer per week    Comment: 1-2 daily     Social History     Substance and Sexual Activity   Drug Use No     Social History     Tobacco Use   Smoking Status Never Smoker   Smokeless Tobacco Never Used     Family History:   Family History   Problem Relation Age of Onset    Hypertension Mother     Hypertension Father        Meds/Allergies     No Known Allergies    Current Outpatient Medications:     alfuzosin (UROXATRAL) 10 mg 24 hr tablet, Take 10 mg by mouth daily  , Disp: , Rfl:     amLODIPine (NORVASC) 5 mg tablet, Take 1 tablet (5 mg total) by mouth daily, Disp: 90 tablet, Rfl: 3    aspirin 81 mg chewable tablet, Chew 81 mg daily  , Disp: , Rfl:     metoprolol tartrate (LOPRESSOR) 25 mg tablet, Take 12 5 mg by mouth every 12 (twelve) hours , Disp: , Rfl:     simvastatin (ZOCOR) 20 mg tablet, Take 20 mg by mouth daily at bedtime  , Disp: , Rfl:     Vitals: Blood pressure 140/80, pulse 66, height 6' (1 829 m), weight 83 kg (183 lb), SpO2 97 %  Body mass index is 24 82 kg/m²  Vitals:    10/29/19 1530   Weight: 83 kg (183 lb)     BP Readings from Last 3 Encounters:   10/29/19 140/80   04/26/19 120/80   10/18/18 134/74         Physical Exam   Constitutional: He is oriented to person, place, and time  He appears well-developed and well-nourished  No distress  HENT:   Head: Normocephalic and atraumatic  Eyes: Pupils are equal, round, and reactive to light  Neck: Neck supple  No JVD present  No tracheal deviation present  No thyromegaly present     Cardiovascular: Normal rate, regular rhythm, S1 normal and S2 normal  Exam reveals no gallop, no S3, no S4, no distant heart sounds and no friction rub  Murmur heard  Systolic (ejection) murmur is present with a grade of 2/6  S1-S2 regular with 2/6 systolic murmur S4 present   Pulmonary/Chest: Effort normal and breath sounds normal  No respiratory distress  He has no wheezes  He has no rales  He exhibits no tenderness  Abdominal: Soft  Bowel sounds are normal  He exhibits no distension  There is no tenderness  Musculoskeletal: He exhibits no edema or deformity  Neurological: He is alert and oriented to person, place, and time  Skin: Skin is warm and dry  No rash noted  He is not diaphoretic  No pallor  Psychiatric: He has a normal mood and affect  His behavior is normal  Judgment normal      Diagnostic Studies Review Cardio:  Stress Test: Stress test in 2013 shows maximum elective exercise stress test patient achieved 86% of his bradycardic heart rate  Repeat exercise stress test done 05/02/2018 was normal   Patient exercised for 10 minutes  Echocardiogram/YOANA: Echo Doppler in 2013 shows normal LV systolic function grade 1 diastolic dysfunction and trace AI mild MR echo Doppler in January 2016 shows EF 55-60%, mild concentric left hypertrophy, mild AI, mild TR PA pressure 35 mm of mercury  Vascular imaging: Carotid Doppler done in 2014 shows no significant carotid artery stenosis bilaterally  ECG Report: EKG shows normal sinus rhythm heart rate 73 bpm this was done in April 2016  RSR pattern, probably normal [id number]  Twelve lead EKG shows normal sinus rhythm heart rate 64 beats per minute incomplete RBBB  Twelve lead EKG in our office shows normal sinus rhythm heart rate 68 beats per minute  RSR pattern  No change from old EKG  Twelve lead EKG 10/29/2019 shows normal sinus rhythm heart rate 71 beats per minute  No significant ST changes no change from old EKG        Cardiac testing: Results for orders placed during the hospital encounter of 17   Echo complete with contrast if indicated    Narrative [place] [address]  [address] [facility]  [address]  [phone]    Transthoracic Echocardiogram  2D, M-mode, Doppler, and Color Doppler    Study date:  2017    Patient: Melba Morelos  MR number: [id number]  Account number: [de-identified]  : 1945  Age: 73 years  Gender: Male  Status: Routine  Location: Echo lab  Height: 72 in  Weight: 184 6 lb  BP: 118/ 64 mmHg    Indications: Murmur    Diagnoses: 785 2 - CARDIAC MURMURS NEC    Sonographer:  Sai Jacob  Primary Physician:  Lindsay Garcia DO  Referring Physician:  Salvador Newman MD  Group:  Melecio Edgar  Interpreting Physician:  Waldo Daugherty DO    SUMMARY    LEFT VENTRICLE:  Systolic function was normal by visual assessment  Ejection fraction was estimated in the range of 55 % to 60 %  There were no regional wall motion abnormalities  There was mild concentric hypertrophy  Doppler parameters were consistent with abnormal left ventricular relaxation (grade 1 diastolic dysfunction)  MITRAL VALVE:  There was trace regurgitation  AORTIC VALVE:  There was no evidence for stenosis  There was mild to moderate regurgitation  TRICUSPID VALVE:  There was mild to moderate regurgitation  Pulmonary artery systolic pressure was within the normal range  HISTORY: PRIOR HISTORY: HTN, Hyperlipidemia, Pancreatic Cancer    PROCEDURE: The procedure was performed in the echo lab  This was a routine study  The transthoracic approach was used  The study included complete 2D imaging, M-mode, complete spectral Doppler, and color Doppler  The heart rate was 65 bpm,  at the start of the study  Image quality was adequate  LEFT VENTRICLE: Size was normal  Systolic function was normal by visual assessment  Ejection fraction was estimated in the range of 55 % to 60 %   There were no regional wall motion abnormalities  There was mild concentric hypertrophy  DOPPLER: Doppler parameters were consistent with abnormal left ventricular relaxation (grade 1 diastolic dysfunction)  RIGHT VENTRICLE: The size was normal  Systolic function was normal  DOPPLER: Systolic pressure was within the normal range  LEFT ATRIUM: The atrium was mildly dilated  RIGHT ATRIUM: Size was normal     MITRAL VALVE: Valve structure was normal  There was normal leaflet separation  No echocardiographic evidence for prolapse  DOPPLER: The transmitral velocity was within the normal range  There was no evidence for stenosis  There was trace  regurgitation  AORTIC VALVE: The valve was trileaflet  Leaflets exhibited normal cuspal separation and sclerosis  DOPPLER: Transaortic velocity was within the normal range  There was no evidence for stenosis  There was mild to moderate regurgitation  TRICUSPID VALVE: The valve structure was normal  There was normal leaflet separation  DOPPLER: The transtricuspid velocity was within the normal range  There was mild to moderate regurgitation  Pulmonary artery systolic pressure was within  the normal range  Estimated peak PA pressure was 36 mmHg  PULMONIC VALVE: Leaflets exhibited normal thickness, no calcification, and normal cuspal separation  DOPPLER: The transpulmonic velocity was within the normal range  There was no regurgitation  PERICARDIUM: There was no thickening  There was no pericardial effusion  AORTA: The root exhibited normal size      PULMONARY ARTERY: The size was normal  The morphology appeared normal     SYSTEM MEASUREMENT TABLES    2D mode  AoR Diam 2D: 3 5 cm  LA Diam (2D): 4 5 cm  LA/Ao (2D): 1 29  FS (2D Teich): 38 4 %  IVSd (2D): 1 04 cm  LVDEV: 84 cm³  LVESV: 26 cm³  LVIDd(2D): 4 32 cm  LVISd (2D): 2 66 cm  LVPWd (2D): 1 12 cm  SV (Teich): 58 cm³    Apical four chamber  LVEF A4C: 55 %    Apical two chamber  LA Area: 20 7 cm squared  LA Volume: 59 cm³    Unspecified Scan Mode  MV Peak A Cy: 970 mm/s  MV Peak E Cy  Mean: 831 mm/s  MVA (PHT): 3 44 cm squared  PHT: 64 ms  Max P mm[Hg]  V Max: 2470 mm/s  Vmax: 2560 mm/s  RA Area: 16 7 cm squared  RA Volume: 42 1 cm³  TAPSE: 2 7 cm    [employer] Accredited Echocardiography Laboratory    Prepared and electronically signed by    Darrius Peters DO  Signed 2017 11:38:55       Lab Review   Labs from 2019 reviewed    Dr Rodolfo Bennett MD [facility]      "This note has been constructed using a voice recognition system  Therefore there may be syntax, spelling, and/or grammatical errors   Please call if you have any questions  "

## 2019-10-29 ENCOUNTER — OFFICE VISIT (OUTPATIENT)
Dept: CARDIOLOGY CLINIC | Facility: CLINIC | Age: 74
End: 2019-10-29
Payer: COMMERCIAL

## 2019-10-29 VITALS
OXYGEN SATURATION: 97 % | BODY MASS INDEX: 24.79 KG/M2 | WEIGHT: 183 LBS | DIASTOLIC BLOOD PRESSURE: 80 MMHG | SYSTOLIC BLOOD PRESSURE: 140 MMHG | HEIGHT: 72 IN | HEART RATE: 66 BPM

## 2019-10-29 DIAGNOSIS — E78.00 HYPERCHOLESTEROLEMIA: ICD-10-CM

## 2019-10-29 DIAGNOSIS — I10 ESSENTIAL HYPERTENSION: ICD-10-CM

## 2019-10-29 DIAGNOSIS — R01.1 CARDIAC MURMUR: ICD-10-CM

## 2019-10-29 DIAGNOSIS — R00.2 PALPITATION: ICD-10-CM

## 2019-10-29 PROCEDURE — 93000 ELECTROCARDIOGRAM COMPLETE: CPT | Performed by: INTERNAL MEDICINE

## 2019-10-29 PROCEDURE — 99214 OFFICE O/P EST MOD 30 MIN: CPT | Performed by: INTERNAL MEDICINE

## 2019-10-29 RX ORDER — AMLODIPINE BESYLATE 5 MG/1
5 TABLET ORAL DAILY
Qty: 90 TABLET | Refills: 3 | Status: SHIPPED | OUTPATIENT
Start: 2019-10-29 | End: 2020-10-05

## 2019-12-03 ENCOUNTER — OFFICE VISIT (OUTPATIENT)
Dept: OTOLARYNGOLOGY | Facility: CLINIC | Age: 74
End: 2019-12-03
Payer: COMMERCIAL

## 2019-12-03 ENCOUNTER — OFFICE VISIT (OUTPATIENT)
Dept: AUDIOLOGY | Facility: CLINIC | Age: 74
End: 2019-12-03
Payer: COMMERCIAL

## 2019-12-03 VITALS
HEIGHT: 72 IN | TEMPERATURE: 98 F | DIASTOLIC BLOOD PRESSURE: 70 MMHG | HEART RATE: 69 BPM | SYSTOLIC BLOOD PRESSURE: 116 MMHG | BODY MASS INDEX: 24.54 KG/M2 | WEIGHT: 181.2 LBS

## 2019-12-03 DIAGNOSIS — H90.3 SENSORY HEARING LOSS, BILATERAL: Primary | ICD-10-CM

## 2019-12-03 DIAGNOSIS — H90.3 SENSORINEURAL HEARING LOSS (SNHL), BILATERAL: ICD-10-CM

## 2019-12-03 DIAGNOSIS — H61.23 BILATERAL IMPACTED CERUMEN: Primary | ICD-10-CM

## 2019-12-03 PROCEDURE — 99203 OFFICE O/P NEW LOW 30 MIN: CPT | Performed by: SPECIALIST

## 2019-12-03 PROCEDURE — 92557 COMPREHENSIVE HEARING TEST: CPT | Performed by: AUDIOLOGIST

## 2019-12-03 PROCEDURE — 92567 TYMPANOMETRY: CPT | Performed by: AUDIOLOGIST

## 2019-12-03 PROCEDURE — 69210 REMOVE IMPACTED EAR WAX UNI: CPT | Performed by: SPECIALIST

## 2019-12-03 NOTE — ASSESSMENT & PLAN NOTE
Bilateral cerumen impaction  Removed with irrigation and suction on left, suction and alligator forceps on right  Discussed cerumen maintenance    Follow up prn cerumen care

## 2019-12-03 NOTE — PROGRESS NOTES
Assessment/Plan:    Bilateral impacted cerumen  Bilateral cerumen impaction  Removed with irrigation and suction on left, suction and alligator forceps on right  Discussed cerumen maintenance  Follow up prn cerumen care      Sensorineural hearing loss (SNHL), bilateral  Audiogram today indicating bilateral SNHL mild sloping to severe at high frequency  Tymps type A  Good word discrimination  Reviewed hearing options including hearing protection and hearing amplification  Repeat audiogram in one to two years  Diagnoses and all orders for this visit:    Bilateral impacted cerumen  -     Ear cerumen removal    Sensorineural hearing loss (SNHL), bilateral  -     Ambulatory referral to Audiology          Subjective:      Patient ID: Sharan Squires is a 69 y o  male  Presents today as a new patient due to bilateral blocked ears  Gradually worsening  No otalgia or otorrhea  No current hearing aids  Hearing gradually worsening        The following portions of the patient's history were reviewed and updated as appropriate: allergies, current medications, past family history, past medical history, past social history, past surgical history and problem list     Review of Systems   Constitutional: Negative  HENT: Positive for hearing loss  Negative for congestion, ear discharge, ear pain, nosebleeds, postnasal drip, rhinorrhea, sinus pressure, sinus pain, sore throat, tinnitus and voice change  Eyes: Negative  Respiratory: Negative for chest tightness and shortness of breath  Cardiovascular: Negative  Gastrointestinal: Negative  Endocrine: Negative  Musculoskeletal: Negative  Skin: Negative for color change  Neurological: Negative for dizziness, numbness and headaches  Psychiatric/Behavioral: Negative            Objective:      /70 (BP Location: Left arm, Patient Position: Sitting, Cuff Size: Adult)   Pulse 69   Temp 98 °F (36 7 °C) (Oral)   Ht 6' (1 829 m)   [employer] 82 2 kg (181 lb 3 2 oz)   BMI 24 58 kg/m²          Physical Exam   Constitutional: He is oriented to person, place, and time  He appears well-developed and well-nourished  He is cooperative  HENT:   Head: Normocephalic  Right Ear: Hearing, tympanic membrane, external ear and ear canal normal  No drainage or tenderness  Tympanic membrane is not perforated and not erythematous  No decreased hearing is noted  Left Ear: Hearing, tympanic membrane, external ear and ear canal normal  No drainage or tenderness  Tympanic membrane is not perforated and not erythematous  No decreased hearing is noted  Nose: Nose normal  No sinus tenderness, nasal deformity or septal deviation  Mouth/Throat: Uvula is midline, oropharynx is clear and moist and mucous membranes are normal  Mucous membranes are not pale and not dry  No oral lesions  Normal dentition  No oropharyngeal exudate  bilateral cerumen impaction     Neck: Normal range of motion and full passive range of motion without pain  Neck supple  No tracheal deviation present  Cardiovascular: Normal rate  Pulmonary/Chest: Effort normal  No accessory muscle usage  No respiratory distress  Musculoskeletal:        Right shoulder: He exhibits normal range of motion  Lymphadenopathy:     He has no cervical adenopathy  Neurological: He is alert and oriented to person, place, and time  No cranial nerve deficit or sensory deficit  Skin: Skin is warm, dry and intact  Psychiatric: He has a normal mood and affect           Scribe Attestation    I,:   DOUGIE Bower am acting as a scribe while in the presence of the attending physician :        I,:   Trini Gonzalez MD personally performed the services described in this documentation    as scribed in my presence :        Ear cerumen removal  Date/Time: 12/3/2019 3:52 PM  Performed by: Trini Gonzalez MD  Authorized by: Trini Gonzalez MD     Patient location:  Clinic  Consent:     Consent obtained:  Verbal  Universal protocol: Procedure explained and questions answered to patient or proxy's satisfaction: yes    Procedure details:     Location:  L ear, R ear and external auditory canal    Approach:  External  Post-procedure details:     Complication:  None    Hearing quality:  Normal    Patient tolerance of procedure:   Tolerated well, no immediate complications  Comments:      Bilateral cerumen removed with suction and alligator forceps on right and irrigation with suction on left

## 2019-12-03 NOTE — PROGRESS NOTES
HEARING EVALUATION    Name:  Sahara Morales  :    Age:  69 y o  Date of Evaluation: 19     History: cerumen removed Dr  / tinnitus   Reason for visit: Sahara Morales is being seen today at the request of Ms Cristian CONSTANTINO for an evaluation of hearing  Patient reports long term tinnitus and history of  and recreational (cars) noise exposure  EVALUATION:    Otoscopic Evaluation:   Right Ear: cleared canal, narrow    Left Ear: cleared canal, narrowed, TM appears dull     Tympanometry:   Right: Type Ad - hypermobile compliance   Left: Type Ad - hypermobile compliance    Audiogram Results:  Mild sloping to severe SNHL bilaterally with good word recognition scores in quiet  *see attached audiogram    RECOMMENDATIONS:  1  Annual audiological evaluations for monitoring purposes  2  He does not feel that his hearing is a problem on a daily communication basis at this time  PATIENT EDUCATION:   Discussed results and recommendations with Mr Konrad Lindsey  Questions were addressed and the patient was encouraged to contact our department should concerns arise      Marylu Clark , CCC-A, [place]# [id number], Hearing Aid Dispenser, [place]# [id number]  Clinical Audiologist

## 2019-12-03 NOTE — ASSESSMENT & PLAN NOTE
Audiogram today indicating bilateral SNHL mild sloping to severe at high frequency  Tymps type A  Good word discrimination  Reviewed hearing options including hearing protection and hearing amplification  Repeat audiogram in one to two years

## 2019-12-03 NOTE — LETTER
December 3, 2019     Aimee Sanchez DO  [address] [facility] [address]    Patient: Aimee Ruby   YOB: 1945   Date of Visit: 12/3/2019       Dear Dr Elba Marcelo:    Amanjose Lucien was seen for evaluation as part of an ENT assessment by Dr Lara Clonts  Below are my notes for this consultation  If you have questions, please do not hesitate to call me  Sincerely,        Gailen Brittle, Au D , CCC/A  Clinical Audiologist   [place]  [id number]        CC: No Recipients  Gailen Brittle  12/3/2019  5:13 PM  Sign at close encounter  HEARING EVALUATION    Name:  Aimee Ruby  :  1945  Age:  69 y o  Date of Evaluation: 19     History: cerumen removed Dr  / tinnitus   Reason for visit: Aimee Ruby is being seen today at the request of Ms Davi CONSTANTINO for an evaluation of hearing  Patient reports long term tinnitus and history of  and recreational (cars) noise exposure  EVALUATION:    Otoscopic Evaluation:   Right Ear: cleared canal, narrow    Left Ear: cleared canal, narrowed, TM appears dull     Tympanometry:   Right: Type Ad - hypermobile compliance   Left: Type Ad - hypermobile compliance    Audiogram Results:  Mild sloping to severe SNHL bilaterally with good word recognition scores in quiet  *see attached audiogram    RECOMMENDATIONS:  1  Annual audiological evaluations for monitoring purposes  2  He does not feel that his hearing is a problem on a daily communication basis at this time  PATIENT EDUCATION:   Discussed results and recommendations with Mr Mateo Brandt  Questions were addressed and the patient was encouraged to contact our department should concerns arise      Gailen Brittle, Au D , CODY-A, [place]# [id number], Hearing Aid Dispenser, [place]# [id number]  Clinical Audiologist

## 2020-04-06 ENCOUNTER — TELEMEDICINE (OUTPATIENT)
Dept: CARDIOLOGY CLINIC | Facility: CLINIC | Age: 75
End: 2020-04-06
Payer: COMMERCIAL

## 2020-04-06 DIAGNOSIS — I10 ESSENTIAL HYPERTENSION: ICD-10-CM

## 2020-04-06 DIAGNOSIS — E78.00 HYPERCHOLESTEROLEMIA: ICD-10-CM

## 2020-04-06 DIAGNOSIS — R00.2 PALPITATION: ICD-10-CM

## 2020-04-06 DIAGNOSIS — R01.1 CARDIAC MURMUR: ICD-10-CM

## 2020-04-06 PROCEDURE — G2012 BRIEF CHECK IN BY MD/QHP: HCPCS | Performed by: INTERNAL MEDICINE

## 2020-10-01 ENCOUNTER — OFFICE VISIT (OUTPATIENT)
Dept: CARDIOLOGY CLINIC | Facility: CLINIC | Age: 75
End: 2020-10-01
Payer: COMMERCIAL

## 2020-10-01 VITALS
BODY MASS INDEX: 25.15 KG/M2 | OXYGEN SATURATION: 97 % | TEMPERATURE: 97.7 F | HEART RATE: 67 BPM | HEIGHT: 72 IN | DIASTOLIC BLOOD PRESSURE: 78 MMHG | WEIGHT: 185.7 LBS | SYSTOLIC BLOOD PRESSURE: 138 MMHG

## 2020-10-01 DIAGNOSIS — R01.1 CARDIAC MURMUR: ICD-10-CM

## 2020-10-01 DIAGNOSIS — E78.00 HYPERCHOLESTEROLEMIA: ICD-10-CM

## 2020-10-01 DIAGNOSIS — I10 ESSENTIAL HYPERTENSION: ICD-10-CM

## 2020-10-01 DIAGNOSIS — R00.2 PALPITATION: ICD-10-CM

## 2020-10-01 PROCEDURE — 93000 ELECTROCARDIOGRAM COMPLETE: CPT | Performed by: INTERNAL MEDICINE

## 2020-10-01 PROCEDURE — 99214 OFFICE O/P EST MOD 30 MIN: CPT | Performed by: INTERNAL MEDICINE

## 2020-10-01 RX ORDER — CARVEDILOL 3.12 MG/1
3.12 TABLET ORAL 2 TIMES DAILY WITH MEALS
Qty: 180 TABLET | Refills: 2 | Status: SHIPPED | OUTPATIENT
Start: 2020-10-01 | End: 2020-12-15 | Stop reason: SDUPTHER

## 2020-10-01 RX ORDER — FINASTERIDE 5 MG/1
5 TABLET, FILM COATED ORAL DAILY
COMMUNITY
End: 2021-10-22

## 2020-10-03 DIAGNOSIS — I10 ESSENTIAL HYPERTENSION: ICD-10-CM

## 2020-10-05 RX ORDER — AMLODIPINE BESYLATE 5 MG/1
TABLET ORAL
Qty: 90 TABLET | Refills: 3 | Status: SHIPPED | OUTPATIENT
Start: 2020-10-05 | End: 2020-12-15 | Stop reason: SDUPTHER

## 2020-10-15 LAB
ALBUMIN SERPL-MCNC: 4.6 G/DL (ref 3.7–4.7)
ALBUMIN/GLOB SERPL: 1.9 {RATIO} (ref 1.2–2.2)
ALP SERPL-CCNC: 96 IU/L (ref 39–117)
ALT SERPL-CCNC: 40 IU/L (ref 0–44)
AST SERPL-CCNC: 25 IU/L (ref 0–40)
BASOPHILS # BLD AUTO: 0.1 X10E3/UL (ref 0–0.2)
BASOPHILS NFR BLD AUTO: 2 %
BILIRUB SERPL-MCNC: 0.8 MG/DL (ref 0–1.2)
BUN SERPL-MCNC: 14 MG/DL (ref 8–27)
BUN/CREAT SERPL: 18 (ref 10–24)
CALCIUM SERPL-MCNC: 9.6 MG/DL (ref 8.6–10.2)
CHLORIDE SERPL-SCNC: 100 MMOL/L (ref 96–106)
CHOLEST SERPL-MCNC: 207 MG/DL (ref 100–199)
CO2 SERPL-SCNC: 24 MMOL/L (ref 20–29)
CREAT SERPL-MCNC: 0.79 MG/DL (ref 0.76–1.27)
EOSINOPHIL # BLD AUTO: 0.2 X10E3/UL (ref 0–0.4)
EOSINOPHIL NFR BLD AUTO: 2 %
ERYTHROCYTE [DISTWIDTH] IN BLOOD BY AUTOMATED COUNT: 13.1 % (ref 11.6–15.4)
GLOBULIN SER-MCNC: 2.4 G/DL (ref 1.5–4.5)
GLUCOSE SERPL-MCNC: 119 MG/DL (ref 65–99)
HCT VFR BLD AUTO: 47.6 % (ref 37.5–51)
HDLC SERPL-MCNC: 72 MG/DL
HGB BLD-MCNC: 16.4 G/DL (ref 13–17.7)
IMM GRANULOCYTES # BLD: 0 X10E3/UL (ref 0–0.1)
IMM GRANULOCYTES NFR BLD: 0 %
LDLC SERPL CALC-MCNC: 116 MG/DL (ref 0–99)
LYMPHOCYTES # BLD AUTO: 2.3 X10E3/UL (ref 0.7–3.1)
LYMPHOCYTES NFR BLD AUTO: 36 %
MCH RBC QN AUTO: 32.2 PG (ref 26.6–33)
MCHC RBC AUTO-ENTMCNC: 34.5 G/DL (ref 31.5–35.7)
MCV RBC AUTO: 94 FL (ref 79–97)
MONOCYTES # BLD AUTO: 0.9 X10E3/UL (ref 0.1–0.9)
MONOCYTES NFR BLD AUTO: 14 %
NEUTROPHILS # BLD AUTO: 2.8 X10E3/UL (ref 1.4–7)
NEUTROPHILS NFR BLD AUTO: 46 %
PLATELET # BLD AUTO: 332 X10E3/UL (ref 150–450)
POTASSIUM SERPL-SCNC: 4.1 MMOL/L (ref 3.5–5.2)
PROT SERPL-MCNC: 7 G/DL (ref 6–8.5)
RBC # BLD AUTO: 5.09 X10E6/UL (ref 4.14–5.8)
SL AMB EGFR AFRICAN AMERICAN: 103 ML/MIN/1.73
SL AMB EGFR NON AFRICAN AMERICAN: 89 ML/MIN/1.73
SL AMB VLDL CHOLESTEROL CALC: 19 MG/DL (ref 5–40)
SODIUM SERPL-SCNC: 137 MMOL/L (ref 134–144)
TRIGL SERPL-MCNC: 106 MG/DL (ref 0–149)
TSH SERPL DL<=0.005 MIU/L-ACNC: 2.91 UIU/ML (ref 0.45–4.5)
WBC # BLD AUTO: 6.2 X10E3/UL (ref 3.4–10.8)

## 2020-10-16 ENCOUNTER — TELEPHONE (OUTPATIENT)
Dept: CARDIOLOGY CLINIC | Facility: CLINIC | Age: 75
End: 2020-10-16

## 2020-11-10 ENCOUNTER — TELEPHONE (OUTPATIENT)
Dept: CARDIOLOGY CLINIC | Facility: CLINIC | Age: 75
End: 2020-11-10

## 2020-11-13 ENCOUNTER — CLINICAL SUPPORT (OUTPATIENT)
Dept: CARDIOLOGY CLINIC | Facility: CLINIC | Age: 75
End: 2020-11-13
Payer: COMMERCIAL

## 2020-11-13 VITALS
HEART RATE: 66 BPM | BODY MASS INDEX: 25.12 KG/M2 | OXYGEN SATURATION: 97 % | SYSTOLIC BLOOD PRESSURE: 154 MMHG | WEIGHT: 185.5 LBS | DIASTOLIC BLOOD PRESSURE: 88 MMHG | TEMPERATURE: 98.6 F | HEIGHT: 72 IN

## 2020-11-13 DIAGNOSIS — I10 HYPERTENSION, UNSPECIFIED TYPE: Primary | ICD-10-CM

## 2020-11-13 PROCEDURE — 99213 OFFICE O/P EST LOW 20 MIN: CPT

## 2020-12-15 ENCOUNTER — OFFICE VISIT (OUTPATIENT)
Dept: CARDIOLOGY CLINIC | Facility: CLINIC | Age: 75
End: 2020-12-15
Payer: COMMERCIAL

## 2020-12-15 VITALS
DIASTOLIC BLOOD PRESSURE: 94 MMHG | BODY MASS INDEX: 25.26 KG/M2 | TEMPERATURE: 98.6 F | HEIGHT: 72 IN | OXYGEN SATURATION: 97 % | WEIGHT: 186.5 LBS | SYSTOLIC BLOOD PRESSURE: 168 MMHG | HEART RATE: 79 BPM

## 2020-12-15 DIAGNOSIS — R01.1 CARDIAC MURMUR: ICD-10-CM

## 2020-12-15 DIAGNOSIS — R00.2 PALPITATION: ICD-10-CM

## 2020-12-15 DIAGNOSIS — E78.00 HYPERCHOLESTEROLEMIA: ICD-10-CM

## 2020-12-15 DIAGNOSIS — I10 ESSENTIAL HYPERTENSION: ICD-10-CM

## 2020-12-15 DIAGNOSIS — I10 UNCONTROLLED HYPERTENSION: ICD-10-CM

## 2020-12-15 PROCEDURE — 99214 OFFICE O/P EST MOD 30 MIN: CPT | Performed by: INTERNAL MEDICINE

## 2020-12-15 PROCEDURE — 93000 ELECTROCARDIOGRAM COMPLETE: CPT | Performed by: INTERNAL MEDICINE

## 2020-12-15 RX ORDER — SIMVASTATIN 20 MG
TABLET ORAL
COMMUNITY
Start: 2020-11-02 | End: 2020-12-15

## 2020-12-15 RX ORDER — AMLODIPINE BESYLATE 10 MG/1
10 TABLET ORAL DAILY
Qty: 90 TABLET | Refills: 1 | Status: SHIPPED | OUTPATIENT
Start: 2020-12-15 | End: 2021-04-28

## 2020-12-15 RX ORDER — SPIRONOLACTONE AND HYDROCHLOROTHIAZIDE 25; 25 MG/1; MG/1
0.5 TABLET ORAL DAILY
Qty: 15 TABLET | Refills: 1 | Status: SHIPPED | OUTPATIENT
Start: 2020-12-15 | End: 2021-02-04

## 2020-12-15 RX ORDER — ALFUZOSIN HYDROCHLORIDE 10 MG/1
TABLET, EXTENDED RELEASE ORAL
COMMUNITY
Start: 2020-11-17 | End: 2021-10-22

## 2020-12-15 RX ORDER — CARVEDILOL 6.25 MG/1
6.25 TABLET ORAL 2 TIMES DAILY
Qty: 60 TABLET | Refills: 3 | Status: SHIPPED | OUTPATIENT
Start: 2020-12-15 | End: 2021-12-08 | Stop reason: SDUPTHER

## 2020-12-15 RX ORDER — CARVEDILOL 6.25 MG/1
6.25 TABLET ORAL 2 TIMES DAILY
Qty: 60 TABLET | Refills: 3 | Status: SHIPPED | OUTPATIENT
Start: 2020-12-15 | End: 2020-12-15 | Stop reason: SDUPTHER

## 2020-12-15 RX ORDER — ROSUVASTATIN CALCIUM 5 MG/1
5 TABLET, COATED ORAL DAILY
Qty: 90 TABLET | Refills: 1 | Status: SHIPPED | OUTPATIENT
Start: 2020-12-15 | End: 2021-04-27

## 2021-01-11 LAB
BUN SERPL-MCNC: 14 MG/DL (ref 8–27)
BUN/CREAT SERPL: 17 (ref 10–24)
CALCIUM SERPL-MCNC: 9.3 MG/DL (ref 8.6–10.2)
CHLORIDE SERPL-SCNC: 99 MMOL/L (ref 96–106)
CO2 SERPL-SCNC: 24 MMOL/L (ref 20–29)
CREAT SERPL-MCNC: 0.81 MG/DL (ref 0.76–1.27)
GLUCOSE SERPL-MCNC: 134 MG/DL (ref 65–99)
POTASSIUM SERPL-SCNC: 4.2 MMOL/L (ref 3.5–5.2)
SL AMB EGFR AFRICAN AMERICAN: 102 ML/MIN/1.73
SL AMB EGFR NON AFRICAN AMERICAN: 88 ML/MIN/1.73
SODIUM SERPL-SCNC: 138 MMOL/L (ref 134–144)

## 2021-01-12 ENCOUNTER — TELEPHONE (OUTPATIENT)
Dept: CARDIOLOGY CLINIC | Facility: CLINIC | Age: 76
End: 2021-01-12

## 2021-01-12 NOTE — TELEPHONE ENCOUNTER
----- Message from Joe Moore MD sent at 1/12/2021 11:01 AM EST -----  Patient labs are acceptable  Continue same medications  Patient is advised to follow up  appointment regularly  Please call patient about the  about results

## 2021-02-03 PROBLEM — R19.5 POSITIVE COLORECTAL CANCER SCREENING USING COLOGUARD TEST: Status: ACTIVE | Noted: 2021-02-03

## 2021-02-04 ENCOUNTER — OFFICE VISIT (OUTPATIENT)
Dept: GASTROENTEROLOGY | Facility: CLINIC | Age: 76
End: 2021-02-04
Payer: COMMERCIAL

## 2021-02-04 VITALS
DIASTOLIC BLOOD PRESSURE: 76 MMHG | SYSTOLIC BLOOD PRESSURE: 142 MMHG | HEIGHT: 72 IN | BODY MASS INDEX: 24.65 KG/M2 | HEART RATE: 80 BPM | WEIGHT: 182 LBS

## 2021-02-04 DIAGNOSIS — I10 ESSENTIAL HYPERTENSION: ICD-10-CM

## 2021-02-04 DIAGNOSIS — R19.5 POSITIVE COLORECTAL CANCER SCREENING USING COLOGUARD TEST: Primary | ICD-10-CM

## 2021-02-04 PROBLEM — K43.2 INCISIONAL HERNIA: Status: ACTIVE | Noted: 2021-02-04

## 2021-02-04 PROBLEM — K86.89 PANCREATIC MASS: Status: ACTIVE | Noted: 2021-02-04

## 2021-02-04 PROBLEM — K63.5 COLON POLYPS: Status: ACTIVE | Noted: 2021-02-04

## 2021-02-04 PROBLEM — K64.9 HEMORRHOIDS: Status: ACTIVE | Noted: 2021-02-04

## 2021-02-04 PROCEDURE — 99203 OFFICE O/P NEW LOW 30 MIN: CPT | Performed by: INTERNAL MEDICINE

## 2021-02-04 RX ORDER — SPIRONOLACTONE AND HYDROCHLOROTHIAZIDE 25; 25 MG/1; MG/1
TABLET ORAL
Qty: 15 TABLET | Refills: 1 | Status: SHIPPED | OUTPATIENT
Start: 2021-02-04 | End: 2021-03-08 | Stop reason: SDUPTHER

## 2021-02-04 NOTE — PROGRESS NOTES
[facility] - Outpatient Consultation  Tena Nathan 70 y o  male MRN: [id number]  Encounter: [id number]          ASSESSMENT AND PLAN:      1  Positive colorectal cancer screening using Cologuard test   very pleasant gentleman who we seen greater than 10 years ago  He has a positive Cologuard as well as family history colon cancer father age 80 daughter age 37  He had some subtle changes in his bowels in the sense of smaller caliber and looser in the recent past   No upper GI symptoms   - Colonoscopy; Future  - PAT Covid Screening; Future    ______________________________________________________________________    HPI:    72-year-old gentleman who we saw  greater than 10 years ago thinks he had polyps in the past   There is a family history of colon cancers his daughter age 37 father age 80  Recently the patient had positive Cologuard  He had some subtle changes in his bowel habits in the sense of decreased stool caliber and softer stools  He denies any upper GI symptoms he does describe occasional hemorrhoid irritation  No melena bright red blood per rectum  He is quite active at home he chops wood denies any chest pain increased shortness of breath  He does have a 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  REVIEW OF SYSTEMS:    CONSTITUTIONAL: Denies any fever, chills, rigors, and weight loss  HEENT: No earache or tinnitus  Denies hearing loss or visual disturbances  CARDIOVASCULAR: No chest pain or palpitations  RESPIRATORY: Denies any cough, hemoptysis, shortness of breath or dyspnea on exertion  GASTROINTESTINAL: As noted in the History of Present Illness  GENITOURINARY: No problems with urination  Denies any hematuria or dysuria  NEUROLOGIC: No dizziness or vertigo, denies headaches  MUSCULOSKELETAL: Denies any muscle or joint pain  SKIN: Denies skin rashes or itching  ENDOCRINE: Denies excessive thirst  Denies intolerance to heat or cold  PSYCHOSOCIAL: Denies depression or anxiety  Denies any recent memory loss  Historical Information   Past Medical History:   Diagnosis Date    Hyperlipidemia     Hypertension      Past Surgical History:   Procedure Laterality Date    HERNIA REPAIR       Social History   Social History     Substance and Sexual Activity   Alcohol Use Yes    Alcohol/week: 10 0 standard drinks    Types: 10 Cans of beer per week    Frequency: 4 or more times a week    Drinks per session: 3 or 4    Comment: 2-3 daily  Social History     Substance and Sexual Activity   Drug Use No     Social History     Tobacco Use   Smoking Status Never Smoker   Smokeless Tobacco Never Used     Family History   Problem Relation Age of Onset    Hypertension Mother     Hypertension Father        Meds/Allergies       Current Outpatient Medications:     alfuzosin (UROXATRAL) 10 mg 24 hr tablet    amLODIPine (NORVASC) 10 mg tablet    aspirin 81 mg chewable tablet    carvedilol (COREG) 6 25 mg tablet    finasteride (PROSCAR) 5 mg tablet    rosuvastatin (CRESTOR) 5 mg tablet    spironolactone-hydrochlorothiazide (ALDACTAZIDE) 25-25 mg per tablet    alfuzosin (UROXATRAL) 10 mg 24 hr tablet    No Known Allergies        Objective     Blood pressure 142/76, pulse 80, height 6' (1 829 m), weight 82 6 kg (182 lb)  Body mass index is 24 68 kg/m²  PHYSICAL EXAM:      General Appearance:   Alert, cooperative, no distress   HEENT:   Normocephalic, atraumatic, anicteric      Neck:  Supple, symmetrical, trachea midline   Lungs:   Clear to auscultation bilaterally; no rales, rhonchi or wheezing; respirations unlabored    Heart[de-identified]   Regular rate and rhythm; no murmur, rub, or gallop     Abdomen:   Soft, non-tender, non-distended; normal bowel sounds; no masses, no organomegaly    Genitalia:   Deferred    Rectal:   Deferred    Extremities:  No cyanosis, clubbing or edema  Pulses:  2+ and symmetric    Skin:  No jaundice, rashes, or lesions    Lymph nodes:  No palpable cervical lymphadenopathy        Lab Results:   No visits with results within 1 Day(s) from this visit  Latest known visit with results is:   Orders Only on 01/11/2021   Component Date Value    Glucose, Random 01/11/2021 134*    BUN 01/11/2021 14     Creatinine 01/11/2021 0 81     eGFR Non  01/11/2021 88     eGFR  01/11/2021 102     SL AMB BUN/CREATININE RA* 01/11/2021 17     Sodium 01/11/2021 138     Potassium 01/11/2021 4 2     Chloride 01/11/2021 99     CO2 01/11/2021 24     CALCIUM 01/11/2021 9 3          Radiology Results:   No results found

## 2021-02-18 RX ORDER — MULTIVIT WITH MINERALS/LUTEIN
1000 TABLET ORAL DAILY
COMMUNITY

## 2021-02-18 RX ORDER — MELATONIN
5000 DAILY
COMMUNITY

## 2021-02-23 ENCOUNTER — HOSPITAL ENCOUNTER (OUTPATIENT)
Dept: GASTROENTEROLOGY | Facility: AMBULATORY SURGERY CENTER | Age: 76
Discharge: HOME | End: 2021-02-23
Payer: COMMERCIAL

## 2021-02-23 ENCOUNTER — ANESTHESIA EVENT (OUTPATIENT)
Dept: GASTROENTEROLOGY | Facility: AMBULATORY SURGERY CENTER | Age: 76
End: 2021-02-23

## 2021-02-23 ENCOUNTER — ANESTHESIA (OUTPATIENT)
Dept: GASTROENTEROLOGY | Facility: AMBULATORY SURGERY CENTER | Age: 76
End: 2021-02-23

## 2021-02-23 VITALS
TEMPERATURE: 96.8 F | BODY MASS INDEX: 24.38 KG/M2 | OXYGEN SATURATION: 94 % | HEIGHT: 72 IN | WEIGHT: 180 LBS | DIASTOLIC BLOOD PRESSURE: 84 MMHG | SYSTOLIC BLOOD PRESSURE: 129 MMHG | HEART RATE: 80 BPM | RESPIRATION RATE: 18 BRPM

## 2021-02-23 VITALS — HEART RATE: 75 BPM

## 2021-02-23 DIAGNOSIS — R19.5 POSITIVE COLORECTAL CANCER SCREENING USING COLOGUARD TEST: ICD-10-CM

## 2021-02-23 PROCEDURE — 45385 COLONOSCOPY W/LESION REMOVAL: CPT | Performed by: INTERNAL MEDICINE

## 2021-02-23 PROCEDURE — 99100 ANES PT EXTEME AGE<1 YR&>70: CPT | Performed by: NURSE ANESTHETIST, CERTIFIED REGISTERED

## 2021-02-23 PROCEDURE — 00811 ANES LWR INTST NDSC NOS: CPT | Performed by: NURSE ANESTHETIST, CERTIFIED REGISTERED

## 2021-02-23 RX ORDER — SODIUM CHLORIDE 9 MG/ML
50 INJECTION, SOLUTION INTRAVENOUS CONTINUOUS
Status: DISCONTINUED | OUTPATIENT
Start: 2021-02-23 | End: 2021-02-27 | Stop reason: HOSPADM

## 2021-02-23 RX ORDER — SODIUM CHLORIDE 9 MG/ML
INJECTION, SOLUTION INTRAVENOUS CONTINUOUS PRN
Status: DISCONTINUED | OUTPATIENT
Start: 2021-02-23 | End: 2021-02-23

## 2021-02-23 RX ORDER — SODIUM CHLORIDE 9 MG/ML
30 INJECTION, SOLUTION INTRAVENOUS CONTINUOUS
Status: DISCONTINUED | OUTPATIENT
Start: 2021-02-23 | End: 2021-02-27 | Stop reason: HOSPADM

## 2021-02-23 RX ORDER — PROPOFOL 10 MG/ML
INJECTION, EMULSION INTRAVENOUS AS NEEDED
Status: DISCONTINUED | OUTPATIENT
Start: 2021-02-23 | End: 2021-02-23

## 2021-02-23 RX ORDER — GLYCOPYRROLATE 0.2 MG/ML
INJECTION INTRAMUSCULAR; INTRAVENOUS AS NEEDED
Status: DISCONTINUED | OUTPATIENT
Start: 2021-02-23 | End: 2021-02-23

## 2021-02-23 RX ORDER — SODIUM CHLORIDE 9 MG/ML
20 INJECTION, SOLUTION INTRAVENOUS CONTINUOUS
Status: DISCONTINUED | OUTPATIENT
Start: 2021-02-23 | End: 2021-02-27 | Stop reason: HOSPADM

## 2021-02-23 RX ADMIN — PROPOFOL 20 MG: 10 INJECTION, EMULSION INTRAVENOUS at 11:55

## 2021-02-23 RX ADMIN — SODIUM CHLORIDE: 9 INJECTION, SOLUTION INTRAVENOUS at 11:45

## 2021-02-23 RX ADMIN — PROPOFOL 20 MG: 10 INJECTION, EMULSION INTRAVENOUS at 12:05

## 2021-02-23 RX ADMIN — PROPOFOL 20 MG: 10 INJECTION, EMULSION INTRAVENOUS at 11:57

## 2021-02-23 RX ADMIN — PROPOFOL 20 MG: 10 INJECTION, EMULSION INTRAVENOUS at 12:01

## 2021-02-23 RX ADMIN — PROPOFOL 20 MG: 10 INJECTION, EMULSION INTRAVENOUS at 12:11

## 2021-02-23 RX ADMIN — PROPOFOL 40 MG: 10 INJECTION, EMULSION INTRAVENOUS at 11:51

## 2021-02-23 RX ADMIN — PROPOFOL 40 MG: 10 INJECTION, EMULSION INTRAVENOUS at 12:03

## 2021-02-23 RX ADMIN — PROPOFOL 20 MG: 10 INJECTION, EMULSION INTRAVENOUS at 12:07

## 2021-02-23 RX ADMIN — GLYCOPYRROLATE 0.2 MG: 0.2 INJECTION INTRAMUSCULAR; INTRAVENOUS at 12:09

## 2021-02-23 RX ADMIN — PROPOFOL 20 MG: 10 INJECTION, EMULSION INTRAVENOUS at 12:13

## 2021-02-23 RX ADMIN — PROPOFOL 40 MG: 10 INJECTION, EMULSION INTRAVENOUS at 11:49

## 2021-02-23 RX ADMIN — PROPOFOL 40 MG: 10 INJECTION, EMULSION INTRAVENOUS at 12:09

## 2021-02-23 RX ADMIN — PROPOFOL 80 MG: 10 INJECTION, EMULSION INTRAVENOUS at 11:48

## 2021-02-23 RX ADMIN — PROPOFOL 20 MG: 10 INJECTION, EMULSION INTRAVENOUS at 11:59

## 2021-02-23 RX ADMIN — PROPOFOL 20 MG: 10 INJECTION, EMULSION INTRAVENOUS at 11:53

## 2021-02-23 NOTE — ANESTHESIA POSTPROCEDURE EVALUATION
Post-Op Assessment Note    CV Status:  Stable  Pain Score: 0    Pain management: adequate     Mental Status:  Alert and awake   Hydration Status:  Euvolemic   PONV Controlled:  Controlled   Airway Patency:  Patent      Post Op Vitals Reviewed: Yes      Staff: CRNA         No complications documented      BP   118/71   Temp     Pulse  75   Resp   16   SpO2   98%

## 2021-02-23 NOTE — ANESTHESIA PREPROCEDURE EVALUATION
Procedure:  COLONOSCOPY    Relevant Problems   CARDIO   (+) Cardiac murmur   (+) Chest pain   (+) Essential hypertension   (+) Hypercholesterolemia      /RENAL   (+) Benign prostatic hyperplasia with urinary obstruction        Physical Exam    Airway    Mallampati score: II  TM Distance: >3 FB  Neck ROM: full     Dental       Cardiovascular  Rhythm: regular, Rate: normal,     Pulmonary  Breath sounds clear to auscultation,     Other Findings        Anesthesia Plan  ASA Score- 3     Anesthesia Type- IV sedation with anesthesia with ASA Monitors  Additional Monitors:   Airway Plan:           Plan Factors-Exercise tolerance (METS): >4 METS  Chart reviewed  EKG reviewed  Existing labs reviewed  Patient summary reviewed  Patient is not a current smoker  Induction- intravenous  Postoperative Plan-     Informed Consent- Anesthetic plan and risks discussed with patient

## 2021-02-23 NOTE — DISCHARGE INSTRUCTIONS
High Fiber Diet   WHAT YOU NEED TO KNOW:   What is a high-fiber diet? A high-fiber diet includes foods that have a high amount of fiber  Fiber is the part of fruits, vegetables, and grains that is not broken down by your body  Fiber keeps your bowel movements regular  Fiber can also help lower your cholesterol level, control blood sugar in people with diabetes, and relieve constipation  Fiber can also help you control your weight because it helps you feel full faster  Most adults should eat 25 to 35 grams of fiber each day  Talk to your dietitian or healthcare provider about the amount of fiber you need  What foods are good sources of fiber? · Foods with at least 4 grams of fiber per serving:      ? ? to ½ cup of high-fiber cereal (check the nutrition label on the box)    ? ½ cup of blackberries or raspberries    ? 4 dried prunes    ? 1 cooked artichoke    ? ½ cup of cooked legumes, such as lentils, or red, kidney, and cavanaugh beans    · Foods with 1 to 3 grams of fiber per serving:      ? 1 slice of whole-wheat, pumpernickel, or rye bread    ? ½ cup of cooked brown rice    ? 4 whole-wheat crackers    ? 1 cup of oatmeal    ? ½ cup of cereal with 1 to 3 grams of fiber per serving (check the nutrition label on the box)    ? 1 small piece of fruit, such as an apple, banana, pear, kiwi, or orange    ? 3 dates    ? ½ cup of canned apricots, fruit cocktail, peaches, or pears    ? ½ cup of raw or cooked vegetables, such as carrots, cauliflower, cabbage, spinach, squash, or corn  What are some ways that I can increase fiber in my diet? · Choose brown or wild rice instead of white rice  · Use whole wheat flour in recipes instead of white or all-purpose flour  · Add beans and peas to casseroles or soups  · Choose fresh fruit and vegetables with peels or skins on instead of juices  What other guidelines should I follow? · Add fiber to your diet slowly    You may have abdominal discomfort, bloating, and gas if you add fiber to your diet too quickly  · Drink plenty of liquids as you add fiber to your diet  You may have nausea or develop constipation if you do not drink enough water  Ask how much liquid to drink each day and which liquids are best for you  CARE AGREEMENT:   You have the right to help plan your care  Discuss treatment options with your healthcare provider to decide what care you want to receive  You always have the right to refuse treatment  The above information is an  only  It is not intended as medical advice for individual conditions or treatments  Talk to your doctor, nurse or pharmacist before following any medical regimen to see if it is safe and effective for you  © Copyright [address] Information is for End User's use only and may not be sold, redistributed or otherwise used for commercial purposes  All illustrations and images included in CareNotes® are the copyrighted property of [employer]  or [facility] Fatimah Moore   Hemorrhoids   WHAT YOU NEED TO KNOW:   What are hemorrhoids? Hemorrhoids are swollen blood vessels inside your rectum (internal hemorrhoids) or on your anus (external hemorrhoids)  Sometimes a hemorrhoid may prolapse  This means it extends out of your anus  What increases my risk for hemorrhoids? · Pregnancy or obesity    · Straining or sitting for a long time during bowel movements    · Liver disease    · Weak muscles around the anus caused by older age, rectal surgery, or anal intercourse    · A lack of physical activity    · Chronic diarrhea or constipation    · A low-fiber diet    What are the signs and symptoms of hemorrhoids?    · Pain or itching around your anus or inside your rectum    · Swelling or bumps around your anus    · Bright red blood in your bowel movement, on the toilet paper, or in the toilet bowl    · Tissue bulging out of your anus (prolapsed hemorrhoids)    · Incontinence (poor control over urine or bowel movements)    How are hemorrhoids diagnosed? Your healthcare provider will ask about your symptoms, the foods you eat, and your bowel movements  He or she will examine your anus for external hemorrhoids  You may need the following:  · A digital rectal exam  is a test to check for hemorrhoids  Your healthcare provider will put a gloved finger inside your anus to feel for the hemorrhoids  · An anoscopy  is a test that uses a scope (small tube with a light and camera on the end) to look at your hemorrhoids  How are hemorrhoids treated? Treatment will depend on your symptoms  You may need any of the following:  · Medicines  can help decrease pain and swelling, and soften your bowel movement  The medicine may be a pill, pad, cream, or ointment  · Procedures  may be used to shrink or remove your hemorrhoid  Examples include rubber-band ligation, sclerotherapy, and photocoagulation  These procedures may be done in your healthcare provider's office  Ask your healthcare provider for more information about these procedures  · Surgery  may be needed to shrink or remove your hemorrhoids  How can I manage my symptoms? · Apply ice on your anus for 15 to 20 minutes every hour or as directed  Use an ice pack, or put crushed ice in a plastic bag  Cover it with a towel before you apply it to your anus  Ice helps prevent tissue damage and decreases swelling and pain  · Take a sitz bath  Fill a bathtub with 4 to 6 inches of warm water  You may also use a sitz bath pan that fits inside a toilet bowl  Sit in the sitz bath for 15 minutes  Do this 3 times a day, and after each bowel movement  The warm water can help decrease pain and swelling  · Keep your anal area clean  Gently wash the area with warm water daily  Soap may irritate the area  After a bowel movement, wipe with moist towelettes or wet toilet paper  Dry toilet paper can irritate the area  How can I help prevent hemorrhoids?    · Do not strain to have a bowel movement  Do not sit on the toilet too long  These actions can increase pressure on the tissues in your rectum and anus  · Drink plenty of liquids  Liquids can help prevent constipation  Ask how much liquid to drink each day and which liquids are best for you  · Eat a variety of high-fiber foods  Examples include fruits, vegetables, and whole grains  Ask your healthcare provider how much fiber you need each day  You may need to take a fiber supplement  · Exercise as directed  Exercise, such as walking, may make it easier to have a bowel movement  Ask your healthcare provider to help you create an exercise plan  · Do not have anal sex  Anal sex can weaken the skin around your rectum and anus  · Avoid heavy lifting  This can cause straining and increase your risk for another hemorrhoid  When should I seek immediate care? · You have severe pain in your rectum or around your anus  · You have severe pain in your abdomen and you are vomiting  · You have bleeding from your anus that soaks through your underwear  When should I contact my healthcare provider? · You have frequent and painful bowel movements  · Your hemorrhoid looks or feels more swollen than usual      · You do not have a bowel movement for 2 days or more  · You see or feel tissue coming through your anus  · You have questions or concerns about your condition or care  CARE AGREEMENT:   You have the right to help plan your care  Learn about your health condition and how it may be treated  Discuss treatment options with your healthcare providers to decide what care you want to receive  You always have the right to refuse treatment  The above information is an  only  It is not intended as medical advice for individual conditions or treatments  Talk to your doctor, nurse or pharmacist before following any medical regimen to see if it is safe and effective for you    © Copyright [address] Information is for [employer] use only and may not be sold, redistributed or otherwise used for commercial purposes  All illustrations and images included in CareNotes® are the copyrighted property of [employer]  or Katarina Palacio  Diverticulosis   WHAT YOU NEED TO KNOW:   What is diverticulosis? Diverticulosis is a condition that causes small pockets called diverticula to form in your intestine  These pockets make it difficult for bowel movements to pass through your digestive system  What causes diverticulosis? Diverticula form when muscles have to work hard to move bowel movements through the intestine  The force causes bulges to form at weak areas in the intestine  This may happen if you eat foods that are low in fiber  Fiber helps give your bowel movements more bulk so they are larger and easier to move through your colon  The following may increase your risk of diverticulosis:  · A history of constipation    · Age 36 or older    · Obesity    · Lack of exercise    What are the signs and symptoms of diverticulosis? Diverticulosis usually does not cause any signs or symptoms  It may cause any of the following in some people:  · Pain or discomfort in your lower abdomen    · Abdominal bloating    · Constipation or diarrhea    How is diverticulosis diagnosed? Your healthcare provider will examine you and ask about your bowel movements, diet, and symptoms  He or she will also ask about any medical conditions you have or medicines you take  You may need any of the following:  · Blood tests  may be done to check for signs of inflammation  · A barium enema  is an x-ray of your colon that may show diverticula  A tube is put into your anus, and a liquid called barium is put through the tube  Barium is used so that healthcare providers can see your colon more clearly  · Flexible sigmoidoscopy  is a test to look for any changes in your lower intestines and rectum   It may also show the cause of any bleeding or pain  A soft, bendable tube with a light on the end will be put into your anus  It will then be moved forward into your intestine  · A colonoscopy  is used to look at your whole colon  A scope (long bendable tube with a light on the end) is used to take pictures  This test may show diverticula  · A CT scan , or CAT scan, may show diverticula  You may be given contrast liquid before the scan  Tell the healthcare provider if you have ever had an allergic reaction to contrast liquid  How is diverticulosis managed? The goal of treatment is to manage any symptoms you have and prevent other problems such as diverticulitis  Diverticulitis is swelling or infection of the diverticula  Your healthcare provider may recommend any of the following:  · Eat a variety of high-fiber foods  High-fiber foods help you have regular bowel movements  High-fiber foods include cooked beans, fruits, vegetables, and some cereals  Most adults need 25 to 35 grams of fiber each day  Your healthcare provider may recommend that you have more  Ask your healthcare provider how much fiber you need  Increase fiber slowly  You may have abdominal discomfort, bloating, and gas if you add fiber to your diet too quickly  You may need to take a fiber supplement if you are not getting enough fiber from food  · Medicines  to soften your bowel movements may be given  You may also need medicines to treat symptoms such as bloating and pain  · Drink liquids as directed  You may need to drink 2 to 3 liters (8 to 12 cups) of liquids every day  Ask your healthcare provider how much liquid to drink each day and which liquids are best for you  · Apply heat  on your abdomen for 20 to 30 minutes every 2 hours for as many days as directed  Heat helps decrease pain and muscle spasms  How can I help prevent diverticulitis or other symptoms?   The following may help decrease your risk for diverticulitis or symptoms, such as bleeding  Talk to your provider about these or other things you can do to prevent problems that may occur with diverticulosis  · Exercise regularly  Ask your healthcare provider about the best exercise plan for you  Exercise can help you have regular bowel movements  Get 30 minutes of exercise on most days of the week  · Maintain a healthy weight  Ask your healthcare provider how much you should weigh  Ask him or her to help you create a weight loss plan if you are overweight  · Do not smoke  Nicotine and other chemicals in cigarettes increase your risk for diverticulitis  Ask your healthcare provider for information if you currently smoke and need help to quit  E-cigarettes or smokeless tobacco still contain nicotine  Talk to your healthcare provider before you use these products  · Ask your healthcare provider if it is safe to take NSAIDs  NSAIDs may increase your risk of diverticulitis  When should I seek immediate care? · You have severe pain on the left side of your lower abdomen  · Your bowel movements are bright or dark red  When should I contact my healthcare provider? · You have a fever and chills  · You feel dizzy or lightheaded  · You have nausea, or you are vomiting  · You have a change in your bowel movements  · You have questions or concerns about your condition or care  CARE AGREEMENT:   You have the right to help plan your care  Learn about your health condition and how it may be treated  Discuss treatment options with your healthcare providers to decide what care you want to receive  You always have the right to refuse treatment  The above information is an  only  It is not intended as medical advice for individual conditions or treatments  Talk to your doctor, nurse or pharmacist before following any medical regimen to see if it is safe and effective for you    © Copyright [employer] 2020 Information is for End User's use only and may not be sold, redistributed or otherwise used for commercial purposes  All illustrations and images included in CareNotes® are the copyrighted property of [employer]  or Katarina Moore   Colorectal Polyps   WHAT YOU NEED TO KNOW:   What are colorectal polyps? Colorectal polyps are small growths of tissue in the lining of the colon and rectum  Most polyps are hyperplastic polyps and are usually benign (noncancerous)  Certain types of polyps, called adenomatous polyps, may turn into cancer  What increases my risk of colorectal polyps? The exact cause of colorectal polyps is unknown  The following may increase your risk:  · Older age    · A diet of foods high in fat and low in fiber     · Family history of polyps    · Intestinal diseases, such as Crohn's disease or ulcerative colitis    · An unhealthy lifestyle, such as physical inactivity, smoking, or drinking alcohol    · Obesity    What are the signs and symptoms of colorectal polyps? · Blood in your bowel movement or bleeding from the rectum    · Change in bowel movement habits, such as diarrhea and constipation    · Abdominal pain    How are colorectal polyps diagnosed? You should have fecal blood screening once a year for colorectal disease if you are over 48years old  You should be screened earlier if you have an intestinal disease or a family history of polyps or colorectal cancer  During this screening, a sample of your bowel movement is checked for blood, which may be an early sign of colorectal polyps or cancer  You may also need any of the following tests:  · Digital rectal exam:  Your healthcare provider will examine your anus and use a finger to check your rectum for polyps  · Barium enema: A barium enema is an x-ray of the colon  A tube is put into your anus, and a liquid called barium is put through the tube  Barium is used so that healthcare providers can see your colon better on the x-ray film       · Virtual colonoscopy: This is a CT scan that takes pictures of the inside of your colon and rectum  A small, flexible tube is put into your rectum and air or carbon dioxide (gas) is used to expand your colon  This lets healthcare providers clearly see your colon and any polyps on a monitor  · Colonoscopy or sigmoidoscopy: These procedures help your healthcare provider see the inside of your colon using a flexible tube with a small light and camera on the end  During a sigmoidoscopy, your healthcare provider will only look at rectum and lower colon  During a colonoscopy, healthcare providers will look at the full length of your colon  Healthcare providers may remove a small amount of tissue from the colon for a biopsy  How are colorectal polyps treated? A polypectomy is a minimally invasive procedure to remove your polyps  They may be removed during a colonoscopy or sigmoidoscopy  Your healthcare provider may need to remove the polyps with a laparoscope  Laparoscopy is done by inserting a small, flexible scope into incisions made on your abdomen  What are the risks of colorectal polyps? You may bleed during a colonoscopy procedure  Your bowel may be perforated (torn) when polyps are removed  This may lead to an open abdominal surgery  During surgery, you may bleed too much or get an infection  Adenomatous polyps that are not removed may turn into cancer and become more difficult to treat  Where can I find support and more information? · Barbie Elias ([facility]) [address], [place] [address]  Phone: [phone]  Web Address: Hleena Walters  [facility] gov    When should I contact my healthcare provider? · You have a fever  · You have chills, a cough, or feel weak and achy  · You have abdominal pain that does not go away or gets worse after you take medicine  · Your abdomen is swollen  · You are losing weight without trying      · You have questions or concerns about your condition or care  When should I seek immediate care or call 911? · You have sudden shortness of breath  · You have a fast heart rate, fast breathing, or are too dizzy to stand up  · You have severe abdominal pain  · You see blood in your bowel movement  CARE AGREEMENT:   You have the right to help plan your care  Learn about your health condition and how it may be treated  Discuss treatment options with your healthcare providers to decide what care you want to receive  You always have the right to refuse treatment  The above information is an  only  It is not intended as medical advice for individual conditions or treatments  Talk to your doctor, nurse or pharmacist before following any medical regimen to see if it is safe and effective for you  © Copyright [address] Information is for End User's use only and may not be sold, redistributed or otherwise used for commercial purposes   All illustrations and images included in CareNotes® are the copyrighted property of A [employer] M , [employer]  or [address] [place] [address]

## 2021-03-01 DIAGNOSIS — Z23 ENCOUNTER FOR IMMUNIZATION: ICD-10-CM

## 2021-03-05 ENCOUNTER — IMMUNIZATIONS (OUTPATIENT)
Dept: FAMILY MEDICINE CLINIC | Facility: HOSPITAL | Age: 76
End: 2021-03-05

## 2021-03-05 DIAGNOSIS — Z23 ENCOUNTER FOR IMMUNIZATION: Primary | ICD-10-CM

## 2021-03-08 DIAGNOSIS — I10 ESSENTIAL HYPERTENSION: ICD-10-CM

## 2021-03-08 RX ORDER — SPIRONOLACTONE AND HYDROCHLOROTHIAZIDE 25; 25 MG/1; MG/1
0.5 TABLET ORAL DAILY
Qty: 30 TABLET | Refills: 2 | Status: SHIPPED | OUTPATIENT
Start: 2021-03-08 | End: 2021-08-03

## 2021-03-19 ENCOUNTER — OFFICE VISIT (OUTPATIENT)
Dept: CARDIOLOGY CLINIC | Facility: CLINIC | Age: 76
End: 2021-03-19
Payer: COMMERCIAL

## 2021-03-19 VITALS
HEART RATE: 70 BPM | DIASTOLIC BLOOD PRESSURE: 82 MMHG | TEMPERATURE: 97.5 F | HEIGHT: 72 IN | SYSTOLIC BLOOD PRESSURE: 144 MMHG | OXYGEN SATURATION: 97 % | BODY MASS INDEX: 24.79 KG/M2 | WEIGHT: 183 LBS

## 2021-03-19 DIAGNOSIS — K86.89 PANCREATIC MASS: ICD-10-CM

## 2021-03-19 DIAGNOSIS — I10 ESSENTIAL HYPERTENSION: ICD-10-CM

## 2021-03-19 DIAGNOSIS — E78.00 HYPERCHOLESTEROLEMIA: ICD-10-CM

## 2021-03-19 DIAGNOSIS — R00.2 PALPITATION: ICD-10-CM

## 2021-03-19 DIAGNOSIS — R01.1 CARDIAC MURMUR: ICD-10-CM

## 2021-03-19 PROCEDURE — 1036F TOBACCO NON-USER: CPT | Performed by: INTERNAL MEDICINE

## 2021-03-19 PROCEDURE — 3008F BODY MASS INDEX DOCD: CPT | Performed by: INTERNAL MEDICINE

## 2021-03-19 PROCEDURE — 3077F SYST BP >= 140 MM HG: CPT | Performed by: INTERNAL MEDICINE

## 2021-03-19 PROCEDURE — 99214 OFFICE O/P EST MOD 30 MIN: CPT | Performed by: INTERNAL MEDICINE

## 2021-03-19 PROCEDURE — 3079F DIAST BP 80-89 MM HG: CPT | Performed by: INTERNAL MEDICINE

## 2021-03-19 PROCEDURE — 93000 ELECTROCARDIOGRAM COMPLETE: CPT | Performed by: INTERNAL MEDICINE

## 2021-03-19 PROCEDURE — 1160F RVW MEDS BY RX/DR IN RCRD: CPT | Performed by: INTERNAL MEDICINE

## 2021-03-19 NOTE — PROGRESS NOTES
Progress Note - Cardiology Office  Lazara Templeton 70 y o  male MRN: [id number]  : 1945  Encounter: [id number]      Assessment:     1  Essential hypertension    2  Palpitation    3  Pancreatic mass    4  Cardiac murmur    5  Hypercholesterolemia        Discussion Summary and Plan:  1  History of pancreatic cancer status post Whipple procedure  Patient regularly follows up at [facility]  He follows them about every 2 years  His weight has been stable  No other issues    2  Essential hypertension  Patient blood pressure has been uncontrolled  Will continue amlodipine 10 mg  Coreg 6 25 twice a day but will add Aldactazide half tablet daily  Blood pressure is acceptable  His electrolyte done  were acceptable  3  Palpitations  Not much of a problem heart rate is   70 beats per minute  Continue current Rx    4  Dyslipidemia  He tolerate his Crestor 5 mg very well  Once his cholesterol repeated if needed we can increase the dose      5  History of BPH on Flomax  He is tolerating it very well  Management as per Urology    6  Cardiac murmur  Patient has  2/6 ejection systolic murmur  Echo shows no significant valvular disease  Discussed with patient  Will continue to monitor     follow-up in 6 months      Counseling :  A description of the counseling  Regular exercise, education about pathophysiology of coronary artery disease discussed with patient at length  Patient's ability to self care: Yes  Medication side effect reviewed with patient in detail and all their questions answered to their satisfaction  HPI :     Lazara Templeton is a 70y o  year old male who came for follow up  Patient has with a past medical history significant for borderline hypertension, incomplete RBBB, pancreatic cancer status post Whipple procedure at [facility], palpitations, dyslipidemia and BPH who came for regular follow-up   He's been doing pretty well in fact he has gained around 20 pounds  Denies any chest pain  Any shortness of breath or any other significant complaint  last cardiac workup was in 2013 which shows he had normal LV function mild MR and trace AI  He is scheduled to go to [facility] for follow-up on his Whipple procedure      10/01/2020  Above reviewed  Patient came for follow-up  He is doing well  He has recovered well from his valve uses and incarcerated hernia  He is doing well denies any chest pain any shortness of breath today heart rate is 67 beats per minute but blood pressure on arrival was found to be slightly elevated  His doing well from cardiac point of view  No chest pain no shortness of breath no dizziness no lightheadedness no nausea no vomiting no PND no orthopnea  He has medical history significant for essential hypertension, dyslipidemia, history of Whipple procedure for pancreatic issues  No other significant complaint at this time  Blood pressure checked by me was 138/78  Patient was recently started on prostate medications  He has been compliant with his cholesterol medication but no recent blood test       03/19/2021     above reviewed  Patient came for follow-up  His blood pressure was running high  He was added on increased dose of carvedilol and amlodipine  Now blood pressure generally at home is 126  Today blood pressure is around 140 he does get anxious  He monitors blood pressures home  He is on also Crestor 5 mg daily it was changed because of interaction  Today heart rate 70 beats per minute EKG shows no changes no other issues  He is also on spironolactone and Aldactone electrolytes were acceptable  No nausea no vomiting no PND no orthopnea no other issues  Review of Systems   Constitutional: Negative for activity change, chills, diaphoresis, fever and unexpected weight change  HENT: Negative for congestion  Eyes: Negative for discharge and redness     Respiratory: Negative for cough, chest tightness, shortness of breath and wheezing  Cardiovascular: Negative  Negative for chest pain, palpitations and leg swelling  Gastrointestinal: Negative for abdominal pain, diarrhea and nausea  Endocrine: Negative  Genitourinary: Negative for decreased urine volume and urgency  Musculoskeletal: Negative  Negative for arthralgias, back pain and gait problem  Skin: Negative for rash and wound  Allergic/Immunologic: Negative  Neurological: Negative for dizziness, seizures, syncope, weakness, light-headedness and headaches  Hematological: Negative  Psychiatric/Behavioral: Negative for agitation and confusion  The patient is not nervous/anxious          Historical Information   Past Medical History:   Diagnosis Date    BEP (benign enlargement of prostate)     Cancer ([facility] [address] )     pancreatic   whipple procedure  basal cell    Cardiac murmur     Colon polyp     Hyperlipidemia     Hypertension     Positive colorectal cancer screening using Cologuard test 01/2021    PVC (premature ventricular contraction) 1990's    hx of, most recent stress test 2018 - normal results per pt    Right bundle branch block      Past Surgical History:   Procedure Laterality Date    APPENDECTOMY      COLONOSCOPY      HERNIA REPAIR      right inguinal x2 and left inguinal x1    PANCREAS SURGERY      SPLENECTOMY, TOTAL      WHIPPLE PROCEDURE/PANCREATICO-DUODENECTOMY       Social History     Substance and Sexual Activity   Alcohol Use Yes    Alcohol/week: 10 0 standard drinks    Types: 10 Cans of beer per week    Frequency: 4 or more times a week    Drinks per session: 5 or 6    Comment: 5-6 daily     Social History     Substance and Sexual Activity   Drug Use No     Social History     Tobacco Use   Smoking Status Never Smoker   Smokeless Tobacco Never Used     Family History:   Family History   Problem Relation Age of Onset    Hypertension Mother     Hypertension Father     Cancer Father     Cancer Daughter Meds/Allergies     No Known Allergies    Current Outpatient Medications:     alfuzosin (UROXATRAL) 10 mg 24 hr tablet, TAKE 1 TABLET BY MOUTH  DAILY WITH BREAKFAST, Disp: , Rfl:     amLODIPine (NORVASC) 10 mg tablet, Take 1 tablet (10 mg total) by mouth daily, Disp: 90 tablet, Rfl: 1    Ascorbic Acid (vitamin C) 1000 MG tablet, Take 1,000 mg by mouth daily, Disp: , Rfl:     aspirin 81 mg chewable tablet, Chew 81 mg daily  , Disp: , Rfl:     carvedilol (COREG) 6 25 mg tablet, Take 1 tablet (6 25 mg total) by mouth 2 (two) times a day 2 i, Disp: 60 tablet, Rfl: 3    cholecalciferol (VITAMIN D3) 1,000 units tablet, Take 5,000 Units by mouth daily, Disp: , Rfl:     finasteride (PROSCAR) 5 mg tablet, Take 5 mg by mouth daily, Disp: , Rfl:     rosuvastatin (CRESTOR) 5 mg tablet, Take 1 tablet (5 mg total) by mouth daily, Disp: 90 tablet, Rfl: 1    spironolactone-hydrochlorothiazide (ALDACTAZIDE) 25-25 mg per tablet, Take 0 5 tablets by mouth daily, Disp: 30 tablet, Rfl: 2    alfuzosin (UROXATRAL) 10 mg 24 hr tablet, Take 10 mg by mouth daily  , Disp: , Rfl:     Vitals: Blood pressure 144/82, pulse 70, temperature 97 5 °F (36 4 °C), temperature source Temporal, height 6' (1 829 m), weight 83 kg (183 lb), SpO2 97 %  Body mass index is 24 82 kg/m²  Vitals:    03/19/21 1229   Weight: 83 kg (183 lb)     BP Readings from Last 3 Encounters:   03/19/21 144/82   02/23/21 129/84   02/04/21 142/76         Physical Exam   Constitutional: He is oriented to person, place, and time  He appears well-developed  No distress  HENT:   Head: Normocephalic and atraumatic  Eyes: Pupils are equal, round, and reactive to light  Neck: Normal range of motion  Neck supple  No JVD present  No thyromegaly present  Pulmonary/Chest: No respiratory distress  He has no wheezes  He has no rales  Abdominal: Soft  Bowel sounds are normal  He exhibits no distension  There is no abdominal tenderness  There is no rebound  Musculoskeletal: Normal range of motion  General: No tenderness or edema  Neurological: He is alert and oriented to person, place, and time  Skin: Skin is warm and dry  He is not diaphoretic  Psychiatric: He has a normal mood and affect  His behavior is normal  Judgment normal      Diagnostic Studies Review Cardio:  Stress Test: Stress test in 2013 shows maximum elective exercise stress test patient achieved 86% of his bradycardic heart rate  Repeat exercise stress test done 05/02/2018 was normal   Patient exercised for 10 minutes  Echocardiogram/YOANA: Echo Doppler in 2013 shows normal LV systolic function grade 1 diastolic dysfunction and trace AI mild MR echo Doppler in January 2016 shows EF 55-60%, mild concentric left hypertrophy, mild AI, mild TR PA pressure 35 mm of mercury  Vascular imaging: Carotid Doppler done in 2014 shows no significant carotid artery stenosis bilaterally  ECG Report: EKG shows normal sinus rhythm heart rate 73 bpm this was done in April 2016  RSR pattern, probably normal [id number]  Twelve lead EKG shows normal sinus rhythm heart rate 64 beats per minute incomplete RBBB  Twelve lead EKG in our office shows normal sinus rhythm heart rate 68 beats per minute  RSR pattern  No change from old EKG  Twelve lead EKG 10/29/2019 shows normal sinus rhythm heart rate 71 beats per minute  No significant ST changes no change from old EKG  Twelve lead EKG 10/01/2020 shows normal sinus rhythm heart rate 67 beats per minute no significant ST changes no change from old EKG  Twelve lead EKG 12/15/2020 shows normal sinus rhythm left axis deviation  Q-wave in inferior lead probably due to micro are no change from old EKG  Twelve lead EKG 03/19/2021 shows normal sinus rhythm heart rate 70 beats per minute        Cardiac testing:   Results for orders placed during the hospital encounter of 01/12/17   Echo complete with contrast if indicated Narrative [address]  [address] [facility]  [phone]    Transthoracic Echocardiogram  2D, M-mode, Doppler, and Color Doppler    Study date:  2017    Patient: Agustin Baker  MR number: [id number]  Account number: [de-identified]  : 1945  Age: 73 years  Gender: Male  Status: Routine  Location: Echo lab  Height: 72 in  Weight: 184 6 lb  BP: 118/ 64 mmHg    Indications: Murmur    Diagnoses: 785 2 - CARDIAC MURMURS NEC    Sonographer:  Sai Ayon  Primary Physician:  Katarzyna Olmedo DO  Referring Physician:  Reza Martinez MD  Group:  Basilio Guan  Interpreting Physician:  Fran Borrego DO    SUMMARY    LEFT VENTRICLE:  Systolic function was normal by visual assessment  Ejection fraction was estimated in the range of 55 % to 60 %  There were no regional wall motion abnormalities  There was mild concentric hypertrophy  Doppler parameters were consistent with abnormal left ventricular relaxation (grade 1 diastolic dysfunction)  MITRAL VALVE:  There was trace regurgitation  AORTIC VALVE:  There was no evidence for stenosis  There was mild to moderate regurgitation  TRICUSPID VALVE:  There was mild to moderate regurgitation  Pulmonary artery systolic pressure was within the normal range  HISTORY: PRIOR HISTORY: HTN, Hyperlipidemia, Pancreatic Cancer    PROCEDURE: The procedure was performed in the echo lab  This was a routine study  The transthoracic approach was used  The study included complete 2D imaging, M-mode, complete spectral Doppler, and color Doppler  The heart rate was 65 bpm,  at the start of the study  Image quality was adequate  LEFT VENTRICLE: Size was normal  Systolic function was normal by visual assessment  Ejection fraction was estimated in the range of 55 % to 60 %  There were no regional wall motion abnormalities  There was mild concentric hypertrophy    DOPPLER: Doppler parameters were consistent with abnormal left ventricular relaxation (grade 1 diastolic dysfunction)  RIGHT VENTRICLE: The size was normal  Systolic function was normal  DOPPLER: Systolic pressure was within the normal range  LEFT ATRIUM: The atrium was mildly dilated  RIGHT ATRIUM: Size was normal     MITRAL VALVE: Valve structure was normal  There was normal leaflet separation  No echocardiographic evidence for prolapse  DOPPLER: The transmitral velocity was within the normal range  There was no evidence for stenosis  There was trace  regurgitation  AORTIC VALVE: The valve was trileaflet  Leaflets exhibited normal cuspal separation and sclerosis  DOPPLER: Transaortic velocity was within the normal range  There was no evidence for stenosis  There was mild to moderate regurgitation  TRICUSPID VALVE: The valve structure was normal  There was normal leaflet separation  DOPPLER: The transtricuspid velocity was within the normal range  There was mild to moderate regurgitation  Pulmonary artery systolic pressure was within  the normal range  Estimated peak PA pressure was 36 mmHg  PULMONIC VALVE: Leaflets exhibited normal thickness, no calcification, and normal cuspal separation  DOPPLER: The transpulmonic velocity was within the normal range  There was no regurgitation  PERICARDIUM: There was no thickening  There was no pericardial effusion  AORTA: The root exhibited normal size  PULMONARY ARTERY: The size was normal  The morphology appeared normal     SYSTEM MEASUREMENT TABLES    2D mode  AoR Diam 2D: 3 5 cm  LA Diam (2D): 4 5 cm  LA/Ao (2D): 1 29  FS (2D Teich): 38 4 %  IVSd (2D): 1 04 cm  LVDEV: 84 cm³  LVESV: 26 cm³  LVIDd(2D): 4 32 cm  LVISd (2D): 2 66 cm  LVPWd (2D): 1 12 cm  SV (Teich): 58 cm³    Apical four chamber  LVEF A4C: 55 %    Apical two chamber  LA Area: 20 7 cm squared  LA Volume: 59 cm³    Unspecified Scan Mode  MV Peak A Cy: 970 mm/s  MV Peak E Cy   Mean: 831 mm/s  MVA (PHT): 3 44 cm squared  PHT: 64 ms  Max PG: 26 mm[Hg]  V Max: 2470 mm/s  Vmax: 2560 mm/s  RA Area: 16 7 cm squared  RA Volume: 42 1 cm³  TAPSE: 2 7 cm    [employer] Accredited Echocardiography Laboratory    Prepared and electronically signed by    Robbin López DO  Signed 13-Jan-2017 11:38:55       Lab Review   Labs from July 2019 reviewed    Dr Morteza Azul MD [facility]      "This note has been constructed using a voice recognition system  Therefore there may be syntax, spelling, and/or grammatical errors   Please call if you have any questions  "

## 2021-04-05 ENCOUNTER — IMMUNIZATIONS (OUTPATIENT)
Dept: FAMILY MEDICINE CLINIC | Facility: HOSPITAL | Age: 76
End: 2021-04-05

## 2021-04-05 DIAGNOSIS — Z23 ENCOUNTER FOR IMMUNIZATION: Primary | ICD-10-CM

## 2021-04-05 PROCEDURE — 91301 SARS-COV-2 / COVID-19 MRNA VACCINE (MODERNA) 100 MCG: CPT

## 2021-04-05 PROCEDURE — 0012A SARS-COV-2 / COVID-19 MRNA VACCINE (MODERNA) 100 MCG: CPT

## 2021-04-26 DIAGNOSIS — E78.00 HYPERCHOLESTEROLEMIA: ICD-10-CM

## 2021-04-27 DIAGNOSIS — I10 ESSENTIAL HYPERTENSION: ICD-10-CM

## 2021-04-27 RX ORDER — ROSUVASTATIN CALCIUM 5 MG/1
TABLET, COATED ORAL
Qty: 90 TABLET | Refills: 3 | Status: SHIPPED | OUTPATIENT
Start: 2021-04-27 | End: 2022-03-30

## 2021-04-28 RX ORDER — AMLODIPINE BESYLATE 10 MG/1
TABLET ORAL
Qty: 90 TABLET | Refills: 3 | Status: SHIPPED | OUTPATIENT
Start: 2021-04-28 | End: 2022-03-30

## 2021-07-17 DIAGNOSIS — I10 ESSENTIAL HYPERTENSION: ICD-10-CM

## 2021-07-19 RX ORDER — CARVEDILOL 3.12 MG/1
TABLET ORAL
Qty: 180 TABLET | Refills: 3 | OUTPATIENT
Start: 2021-07-19

## 2021-07-19 NOTE — TELEPHONE ENCOUNTER
L/M for pt to call office to determine if he is still taking carvedilol 3 125mg before renewing prescription

## 2021-08-02 DIAGNOSIS — I10 ESSENTIAL HYPERTENSION: ICD-10-CM

## 2021-08-03 DIAGNOSIS — I10 ESSENTIAL HYPERTENSION: ICD-10-CM

## 2021-08-03 RX ORDER — CARVEDILOL 3.12 MG/1
TABLET ORAL
Qty: 180 TABLET | Refills: 3 | Status: SHIPPED | OUTPATIENT
Start: 2021-08-03 | End: 2021-10-22

## 2021-08-03 RX ORDER — SPIRONOLACTONE AND HYDROCHLOROTHIAZIDE 25; 25 MG/1; MG/1
TABLET ORAL
Qty: 45 TABLET | Refills: 3 | Status: SHIPPED | OUTPATIENT
Start: 2021-08-03 | End: 2022-07-18

## 2021-09-21 ENCOUNTER — NEW PATIENT (OUTPATIENT)
Dept: URBAN - METROPOLITAN AREA CLINIC 27 | Facility: CLINIC | Age: 76
End: 2021-09-21

## 2021-09-21 DIAGNOSIS — H43.813: ICD-10-CM

## 2021-09-21 DIAGNOSIS — H33.302: ICD-10-CM

## 2021-09-21 DIAGNOSIS — H25.9: ICD-10-CM

## 2021-09-21 PROCEDURE — 92134 CPTRZ OPH DX IMG PST SGM RTA: CPT

## 2021-09-21 PROCEDURE — 92201 OPSCPY EXTND RTA DRAW UNI/BI: CPT

## 2021-09-21 PROCEDURE — 67145 PROPH RTA DTCHMNT PC: CPT

## 2021-09-21 PROCEDURE — 99204 OFFICE O/P NEW MOD 45 MIN: CPT | Mod: 57

## 2021-09-21 ASSESSMENT — VISUAL ACUITY
OD_CC: 20/20-1
OS_CC: 20/25

## 2021-09-21 ASSESSMENT — TONOMETRY
OS_IOP_MMHG: 16
OD_IOP_MMHG: 18

## 2021-10-06 ENCOUNTER — TELEPHONE (OUTPATIENT)
Dept: CARDIOLOGY CLINIC | Facility: CLINIC | Age: 76
End: 2021-10-06

## 2021-10-19 ENCOUNTER — POST-OP CHECK (OUTPATIENT)
Dept: URBAN - METROPOLITAN AREA CLINIC 27 | Facility: CLINIC | Age: 76
End: 2021-10-19

## 2021-10-19 DIAGNOSIS — H33.302: ICD-10-CM

## 2021-10-19 PROCEDURE — 99024 POSTOP FOLLOW-UP VISIT: CPT

## 2021-10-19 ASSESSMENT — VISUAL ACUITY: OS_CC: 20/25

## 2021-10-19 ASSESSMENT — TONOMETRY: OS_IOP_MMHG: 13

## 2021-10-22 ENCOUNTER — OFFICE VISIT (OUTPATIENT)
Dept: CARDIOLOGY CLINIC | Facility: CLINIC | Age: 76
End: 2021-10-22
Payer: COMMERCIAL

## 2021-10-22 VITALS
DIASTOLIC BLOOD PRESSURE: 80 MMHG | SYSTOLIC BLOOD PRESSURE: 120 MMHG | WEIGHT: 178 LBS | BODY MASS INDEX: 24.11 KG/M2 | HEIGHT: 72 IN | HEART RATE: 73 BPM | TEMPERATURE: 97.4 F | OXYGEN SATURATION: 98 %

## 2021-10-22 DIAGNOSIS — E78.00 HYPERCHOLESTEROLEMIA: ICD-10-CM

## 2021-10-22 DIAGNOSIS — R01.1 CARDIAC MURMUR: ICD-10-CM

## 2021-10-22 DIAGNOSIS — K86.89 PANCREATIC MASS: ICD-10-CM

## 2021-10-22 DIAGNOSIS — R00.2 PALPITATION: ICD-10-CM

## 2021-10-22 DIAGNOSIS — I10 ESSENTIAL HYPERTENSION: ICD-10-CM

## 2021-10-22 PROCEDURE — 99214 OFFICE O/P EST MOD 30 MIN: CPT | Performed by: INTERNAL MEDICINE

## 2021-10-22 PROCEDURE — 93000 ELECTROCARDIOGRAM COMPLETE: CPT | Performed by: INTERNAL MEDICINE

## 2021-12-08 DIAGNOSIS — I10 ESSENTIAL HYPERTENSION: ICD-10-CM

## 2021-12-08 RX ORDER — CARVEDILOL 6.25 MG/1
6.25 TABLET ORAL 2 TIMES DAILY
Qty: 180 TABLET | Refills: 3 | Status: SHIPPED | OUTPATIENT
Start: 2021-12-08 | End: 2021-12-13 | Stop reason: SDUPTHER

## 2021-12-08 RX ORDER — CARVEDILOL 6.25 MG/1
6.25 TABLET ORAL 2 TIMES DAILY
Qty: 10 TABLET | Refills: 0 | Status: SHIPPED | OUTPATIENT
Start: 2021-12-08 | End: 2021-12-08 | Stop reason: SDUPTHER

## 2021-12-13 DIAGNOSIS — I10 ESSENTIAL HYPERTENSION: ICD-10-CM

## 2021-12-13 RX ORDER — CARVEDILOL 6.25 MG/1
6.25 TABLET ORAL 2 TIMES DAILY
Qty: 60 TABLET | Refills: 0 | Status: SHIPPED | OUTPATIENT
Start: 2021-12-13 | End: 2021-12-13 | Stop reason: SDUPTHER

## 2021-12-13 RX ORDER — CARVEDILOL 6.25 MG/1
6.25 TABLET ORAL 2 TIMES DAILY
Qty: 60 TABLET | Refills: 0 | Status: SHIPPED | OUTPATIENT
Start: 2021-12-13 | End: 2022-01-26 | Stop reason: SDUPTHER

## 2022-01-26 DIAGNOSIS — I10 ESSENTIAL HYPERTENSION: ICD-10-CM

## 2022-01-26 RX ORDER — CARVEDILOL 6.25 MG/1
6.25 TABLET ORAL 2 TIMES DAILY
Qty: 180 TABLET | Refills: 3 | Status: SHIPPED | OUTPATIENT
Start: 2022-01-26

## 2022-03-15 ENCOUNTER — FOLLOW UP (OUTPATIENT)
Dept: URBAN - METROPOLITAN AREA CLINIC 27 | Facility: CLINIC | Age: 77
End: 2022-03-15

## 2022-03-15 DIAGNOSIS — H33.302: ICD-10-CM

## 2022-03-15 DIAGNOSIS — H25.9: ICD-10-CM

## 2022-03-15 DIAGNOSIS — H43.813: ICD-10-CM

## 2022-03-15 PROCEDURE — 92014 COMPRE OPH EXAM EST PT 1/>: CPT

## 2022-03-15 ASSESSMENT — VISUAL ACUITY
OS_CC: 20/25
OD_CC: 20/20

## 2022-03-15 ASSESSMENT — TONOMETRY
OS_IOP_MMHG: 16
OD_IOP_MMHG: 18

## 2022-03-29 DIAGNOSIS — I10 ESSENTIAL HYPERTENSION: ICD-10-CM

## 2022-03-29 DIAGNOSIS — E78.00 HYPERCHOLESTEROLEMIA: ICD-10-CM

## 2022-03-30 RX ORDER — AMLODIPINE BESYLATE 10 MG/1
TABLET ORAL
Qty: 90 TABLET | Refills: 3 | Status: SHIPPED | OUTPATIENT
Start: 2022-03-30

## 2022-03-30 RX ORDER — ROSUVASTATIN CALCIUM 5 MG/1
TABLET, COATED ORAL
Qty: 90 TABLET | Refills: 3 | Status: SHIPPED | OUTPATIENT
Start: 2022-03-30

## 2022-03-31 ENCOUNTER — PREP FOR PROCEDURE (OUTPATIENT)
Dept: GASTROENTEROLOGY | Facility: CLINIC | Age: 77
End: 2022-03-31

## 2022-03-31 ENCOUNTER — TELEPHONE (OUTPATIENT)
Dept: GASTROENTEROLOGY | Facility: CLINIC | Age: 77
End: 2022-03-31

## 2022-03-31 DIAGNOSIS — Z86.010 HISTORY OF COLON POLYPS: Primary | ICD-10-CM

## 2022-03-31 DIAGNOSIS — Z80.0 FAMILY HISTORY OF COLON CANCER: ICD-10-CM

## 2022-03-31 NOTE — TELEPHONE ENCOUNTER
[facility] Assessment    Name: Johnathon Osgood  YOB: 1945  Last Height: 6' (1 829 m)  Last weight: 80 7 kg (178 lb)  BMI: 24 14 kg/m²  Procedure: Colonoscopy  Diagnosis: Hx of polyps / fam hx of colon cancer  Date of procedure: 6/10/22  Prep: Faraz/Dul  Responsible : yes  Phone#: ?  Name completing form: Aguila Garcia  Date form completed: 03/31/22    If the patient answers yes to any of these questions, schedule in a hospital  Are you pregnant: No  Do you rely on a wheelchair for mobility: No  Have you been diagnosed with End Stage Renal Disease (ESRD): No  Do you need oxygen during the day: No  Have you had a heart attack or stroke within the past three months: No  Have you had a seizure within the past three months: No  Have you ever been informed by anesthesia that you have a difficult airway: No  Additional Questions  Have you had any cardiac testing or are under the care of a Cardiologist (see cardiac list): Yes (Comment: Obtain Cardiac Clearance)  Cardiac list:   Do you have an implanted cardiac defibrillator: No (Comment: This patient should be scheduled in the hospital)    Have any bleeding problems, such as anemia or hemophilia (If patient has H&H result below 8, schedule in hospital   H&H must be within 30 days of procedure): No    Had an organ transplant within the past 3 months: No    Do you have any present infections: No  Do you get short of breath when walking a few blocks: No  Have you been diagnosed with diabetes: No  Comments (provide cardiac provider information if applicable): CC sent to Dr Lopez Ruiz 3/31/22

## 2022-03-31 NOTE — TELEPHONE ENCOUNTER
Patient is scheduled for 1 year repeat colonoscopy due to Hx of polyps and Family history of colon cancer(father and Daughter) 6/10/22 with Dr Donny Anne @ [place] [id number]

## 2022-03-31 NOTE — TELEPHONE ENCOUNTER
Patient is scheduled for a colonoscopy with Dr Ruth Delacruz on 6/10/22  Is patient cleared to have procedure under MAC anesthesia at an [facility]?     Thank you

## 2022-04-18 ENCOUNTER — OFFICE VISIT (OUTPATIENT)
Dept: CARDIOLOGY CLINIC | Facility: CLINIC | Age: 77
End: 2022-04-18
Payer: COMMERCIAL

## 2022-04-18 VITALS
OXYGEN SATURATION: 98 % | SYSTOLIC BLOOD PRESSURE: 138 MMHG | BODY MASS INDEX: 23.3 KG/M2 | HEART RATE: 69 BPM | WEIGHT: 172 LBS | HEIGHT: 72 IN | DIASTOLIC BLOOD PRESSURE: 80 MMHG | TEMPERATURE: 98 F

## 2022-04-18 DIAGNOSIS — R01.1 CARDIAC MURMUR: ICD-10-CM

## 2022-04-18 DIAGNOSIS — R00.2 PALPITATION: ICD-10-CM

## 2022-04-18 DIAGNOSIS — I11.9 HYPERTENSIVE HEART DISEASE WITHOUT HEART FAILURE: ICD-10-CM

## 2022-04-18 DIAGNOSIS — E78.00 HYPERCHOLESTEROLEMIA: ICD-10-CM

## 2022-04-18 PROCEDURE — 99214 OFFICE O/P EST MOD 30 MIN: CPT | Performed by: INTERNAL MEDICINE

## 2022-04-18 PROCEDURE — 93000 ELECTROCARDIOGRAM COMPLETE: CPT | Performed by: INTERNAL MEDICINE

## 2022-04-18 NOTE — PROGRESS NOTES
Progress Note - Cardiology Office  Tena Nathan 78 y o  male MRN: [id number]  : 1945  Encounter: [id number]      Assessment:     1  Hypertensive heart disease without heart failure    2  Palpitation    3  Hypercholesterolemia    4  Cardiac murmur        Discussion Summary and Plan:  1  History of pancreatic cancer status post Whipple procedure  Patient regularly follows up at [facility]  He follows them about every 2 years  His weight has been stable no other issues at this time  2     Hypertensive heart disease without heart failure  Patient blood pressure significantly improved with current medications  He was added spironolactone and hydrochlorothiazide half tablet daily  Continue Coreg and amlodipine no more palpitation no recent blood test no lower extremity edema  Will check CMP CBC  3  Palpitations  Palpitations much better since he is on Coreg  Heart rate 69 beats per minute today    4  Dyslipidemia  Continue Crestor 5 mg  Will check fasting lipids and TSH  5  History of BPH on Flomax  He is tolerating it very well  Management as per Urology    6  Cardiac murmur  Patient has  2/6 ejection systolic murmur  Previous echo has shown no significant change  Patient has no clinical change in  His cardiac enzyme  Echo from 2017 was acceptable will continue to monitor     7  History of Whipple procedure follows with [facility] will restart please contact with them  plan  Routine blood test of CMP CBC fasting lipid TSH  Exercise stress due to risk factors   follow-up 6-9 months      Patient was advised and educated to call our office  immediately if  patient has any new symptoms of chest pain/shortness of breath, near-syncope, syncope, light headedness sustained palpitations  or any other cardiovascular symptoms before their scheduled follow-up appointment  Office #[phone]  follow-up in 6 months      Counseling :  A description of the counseling  Regular exercise, education about pathophysiology of coronary artery disease discussed with patient at length  Patient's ability to self care: Yes  Medication side effect reviewed with patient in detail and all their questions answered to their satisfaction  HPI :     Ajrun George is a 78y o  year old male who came for follow up  Patient has with a past medical history significant for borderline hypertension, incomplete RBBB, pancreatic cancer status post Whipple procedure at [facility], palpitations, dyslipidemia and BPH who came for regular follow-up  He's been doing pretty well in fact he has gained around 20 pounds  Denies any chest pain  Any shortness of breath or any other significant complaint  last cardiac workup was in 2013 which shows he had normal LV function mild MR and trace AI  He is scheduled to go to [facility] for follow-up on his Whipple procedure      10/01/2020  Above reviewed  Patient came for follow-up  He is doing well  He has recovered well from his valve uses and incarcerated hernia  He is doing well denies any chest pain any shortness of breath today heart rate is 67 beats per minute but blood pressure on arrival was found to be slightly elevated  His doing well from cardiac point of view  No chest pain no shortness of breath no dizziness no lightheadedness no nausea no vomiting no PND no orthopnea  He has medical history significant for essential hypertension, dyslipidemia, history of Whipple procedure for pancreatic issues  No other significant complaint at this time  Blood pressure checked by me was 138/78  Patient was recently started on prostate medications  He has been compliant with his cholesterol medication but no recent blood test       10/22/2021  Above reviewed  Patient came for follow-up he is doing well with change in medication is blood pressure is now acceptable  His heart rate is 73 beats per minute    He has medical history significant for essential hypertension, dyslipidemia, history of pancreatic mass status post Whipple procedure, who came for follow-up  He has no new complaints his blood test from January 2021 by acceptable  No nausea no vomiting no fever no chills no PND no orthopnea no other cardiovascular issues at this time  His stress test in 2018 and echo Doppler in 2017 were acceptable  04/18/2022  Above reviewed  Patient came for follow-up he is doing well he has no new complaints  He had a medical history significant for essential hypertension, dyslipidemia, history of pancreatic mass status post Whipple procedure who came for follow-up  No nausea no vomiting no fever no chills his last blood test was in January 2021 which was acceptable  He has his stress test and echo Doppler done in 2018 and 17  Today's EKG shows no changes from previous EKG  Occasionally have palpitations but they are not much of a problem at this time         Review of Systems   Constitutional: Negative for activity change, chills, diaphoresis, fever and unexpected weight change  HENT: Negative for congestion  Eyes: Negative for discharge and redness  Respiratory: Negative for cough, chest tightness, shortness of breath and wheezing  Cardiovascular: Negative  Negative for chest pain, palpitations and leg swelling  Gastrointestinal: Negative for abdominal pain, diarrhea and nausea  Endocrine: Negative  Genitourinary: Negative for decreased urine volume and urgency  Musculoskeletal: Negative  Negative for arthralgias, back pain and gait problem  Skin: Negative for rash and wound  Allergic/Immunologic: Negative  Neurological: Negative for dizziness, seizures, syncope, weakness, light-headedness and headaches  Hematological: Negative  Psychiatric/Behavioral: Negative for agitation and confusion  The patient is not nervous/anxious          Historical Information   Past Medical History:   Diagnosis Date    BEP (benign enlargement of prostate)     Cancer (HCC)     pancreatic   whipple procedure  basal cell    Cardiac murmur     Colon polyp     Hyperlipidemia     Hypertension     Positive colorectal cancer screening using Cologuard test 01/2021    PVC (premature ventricular contraction) 1990's    hx of, most recent stress test 2018 - normal results per pt    Right bundle branch block      Past Surgical History:   Procedure Laterality Date    APPENDECTOMY      COLONOSCOPY      HERNIA REPAIR      right inguinal x2 and left inguinal x1    PANCREAS SURGERY      SPLENECTOMY, TOTAL      WHIPPLE PROCEDURE/PANCREATICO-DUODENECTOMY       Social History     Substance and Sexual Activity   Alcohol Use Yes    Alcohol/week: 10 0 standard drinks    Types: 10 Cans of beer per week    Comment: 5-6 daily     Social History     Substance and Sexual Activity   Drug Use No     Social History     Tobacco Use   Smoking Status Never Smoker   Smokeless Tobacco Never Used     Family History:   Family History   Problem Relation Age of Onset    Hypertension Mother     Hypertension Father     Cancer Father     Cancer Daughter        Meds/Allergies     No Known Allergies    Current Outpatient Medications:     alfuzosin (UROXATRAL) 10 mg 24 hr tablet, Take 10 mg by mouth daily  , Disp: , Rfl:     amLODIPine (NORVASC) 10 mg tablet, TAKE 1 TABLET BY MOUTH  DAILY, Disp: 90 tablet, Rfl: 3    Ascorbic Acid (vitamin C) 1000 MG tablet, Take 1,000 mg by mouth daily, Disp: , Rfl:     aspirin 81 mg chewable tablet, Chew 81 mg daily  , Disp: , Rfl:     carvedilol (COREG) 6 25 mg tablet, Take 1 tablet (6 25 mg total) by mouth 2 (two) times a day, Disp: 180 tablet, Rfl: 3    cholecalciferol (VITAMIN D3) 1,000 units tablet, Take 5,000 Units by mouth daily, Disp: , Rfl:     rosuvastatin (CRESTOR) 5 mg tablet, TAKE 1 TABLET BY MOUTH  DAILY, Disp: 90 tablet, Rfl: 3    spironolactone-hydrochlorothiazide (ALDACTAZIDE) 25-25 mg per tablet, TAKE ONE-HALF TABLET BY  MOUTH DAILY, Disp: 45 tablet, Rfl: 3    Vitals: Blood pressure 138/80, pulse 69, temperature 98 °F (36 7 °C), height 6' (1 829 m), weight 78 kg (172 lb), SpO2 98 %  Body mass index is 23 33 kg/m²  Vitals:    04/18/22 1325   Weight: 78 kg (172 lb)     BP Readings from Last 3 Encounters:   04/18/22 138/80   10/22/21 120/80   03/19/21 144/82         Physical Exam  Constitutional:       General: He is not in acute distress  Appearance: He is well-developed  He is not diaphoretic  Neck:      Thyroid: No thyromegaly  Vascular: No JVD  Trachea: No tracheal deviation  Cardiovascular:      Rate and Rhythm: Normal rate and regular rhythm  Heart sounds: S1 normal and S2 normal  Heart sounds not distant  Murmur heard  Systolic (ejection) murmur is present with a grade of 2/6  No friction rub  No gallop  No S3 or S4 sounds  Pulmonary:      Effort: Pulmonary effort is normal  No respiratory distress  Breath sounds: Normal breath sounds  No wheezing or rales  Chest:      Chest wall: No tenderness  Abdominal:      General: Bowel sounds are normal  There is no distension  Palpations: Abdomen is soft  Tenderness: There is no abdominal tenderness  Musculoskeletal:         General: No deformity  Cervical back: Neck supple  Skin:     General: Skin is warm and dry  Coloration: Skin is not pale  Findings: No rash  Neurological:      Mental Status: He is alert and oriented to person, place, and time  Psychiatric:         Behavior: Behavior normal          Judgment: Judgment normal        Diagnostic Studies Review Cardio:  Stress Test: Stress test in 2013 shows maximum elective exercise stress test patient achieved 86% of his bradycardic heart rate  Repeat exercise stress test done 05/02/2018 was normal   Patient exercised for 10 minutes       Echocardiogram/YOANA: Echo Doppler in 2013 shows normal LV systolic function grade 1 diastolic dysfunction and trace AI mild MR echo Doppler in January 2016 shows EF 55-60%, mild concentric left hypertrophy, mild AI, mild TR PA pressure 35 mm of mercury  Vascular imaging: Carotid Doppler done in 2014 shows no significant carotid artery stenosis bilaterally  ECG Report: EKG shows normal sinus rhythm heart rate 73 bpm this was done in April 2016  RSR pattern, probably normal [id number]  Twelve lead EKG shows normal sinus rhythm heart rate 64 beats per minute incomplete RBBB  Twelve lead EKG in our office shows normal sinus rhythm heart rate 68 beats per minute  RSR pattern  No change from old EKG  Twelve lead EKG 10/29/2019 shows normal sinus rhythm heart rate 71 beats per minute  No significant ST changes no change from old EKG  Twelve lead EKG 10/01/2020 shows normal sinus rhythm heart rate 67 beats per minute no significant ST changes no change from old EKG  Twelve lead EKG 12/15/2020 shows normal sinus rhythm left axis deviation  Q-wave in inferior lead probably due to micro are no change from old EKG  Twelve lead EKG 03/19/2021 shows normal sinus rhythm heart rate 70 beats per minute  Twelve lead EKG 10/22/2021 shows normal sinus rhythm heart rate 73 beats per minute small Q in inferior lead most likely pseudo infarct pattern no change from previous EKG  Lead EKG 04/18/2022 shows normal sinus rhythm heart rate 69 beats per minute  Left axis  Q-wave noted in inferior lead most likely with pseudo infarct pattern  No significant change from previous EKG        Cardiac testing:   Results for orders placed during the hospital encounter of 01/12/17   Echo complete with contrast if indicated    [address]   [phone]    Transthoracic Echocardiogram  2D, M-mode, Doppler, and Color Doppler    Study date:  12-Jan-2017    Patient: Nena Hyde  MR number: [id number]  Account number: [id number]  : 1945  Age: 73 years  Gender: Male  Status: Routine  Location: Echo lab  Height: 72 in  Weight: 184 6 lb  BP: 118/ 64 mmHg    Indications: Murmur    Diagnoses: 785 2 - CARDIAC MURMURS NEC    Sonographer:  Sai Mi  Primary Physician:  Kevin Dietz DO  Referring Physician:  Shirley Tidwell MD  Group:  Daksha Brothers  Interpreting Physician:  Katy Rouse DO    SUMMARY    LEFT VENTRICLE:  Systolic function was normal by visual assessment  Ejection fraction was estimated in the range of 55 % to 60 %  There were no regional wall motion abnormalities  There was mild concentric hypertrophy  Doppler parameters were consistent with abnormal left ventricular relaxation (grade 1 diastolic dysfunction)  MITRAL VALVE:  There was trace regurgitation  AORTIC VALVE:  There was no evidence for stenosis  There was mild to moderate regurgitation  TRICUSPID VALVE:  There was mild to moderate regurgitation  Pulmonary artery systolic pressure was within the normal range  HISTORY: PRIOR HISTORY: HTN, Hyperlipidemia, Pancreatic Cancer    PROCEDURE: The procedure was performed in the echo lab  This was a routine study  The transthoracic approach was used  The study included complete 2D imaging, M-mode, complete spectral Doppler, and color Doppler  The heart rate was 65 bpm,  at the start of the study  Image quality was adequate  LEFT VENTRICLE: Size was normal  Systolic function was normal by visual assessment  Ejection fraction was estimated in the range of 55 % to 60 %  There were no regional wall motion abnormalities  There was mild concentric hypertrophy  DOPPLER: Doppler parameters were consistent with abnormal left ventricular relaxation (grade 1 diastolic dysfunction)  RIGHT VENTRICLE: The size was normal  Systolic function was normal  DOPPLER: Systolic pressure was within the normal range  LEFT ATRIUM: The atrium was mildly dilated      RIGHT ATRIUM: Size was normal     MITRAL VALVE: Valve structure was normal  There was normal leaflet separation  No echocardiographic evidence for prolapse  DOPPLER: The transmitral velocity was within the normal range  There was no evidence for stenosis  There was trace  regurgitation  AORTIC VALVE: The valve was trileaflet  Leaflets exhibited normal cuspal separation and sclerosis  DOPPLER: Transaortic velocity was within the normal range  There was no evidence for stenosis  There was mild to moderate regurgitation  TRICUSPID VALVE: The valve structure was normal  There was normal leaflet separation  DOPPLER: The transtricuspid velocity was within the normal range  There was mild to moderate regurgitation  Pulmonary artery systolic pressure was within  the normal range  Estimated peak PA pressure was 36 mmHg  PULMONIC VALVE: Leaflets exhibited normal thickness, no calcification, and normal cuspal separation  DOPPLER: The transpulmonic velocity was within the normal range  There was no regurgitation  PERICARDIUM: There was no thickening  There was no pericardial effusion  AORTA: The root exhibited normal size  PULMONARY ARTERY: The size was normal  The morphology appeared normal     SYSTEM MEASUREMENT TABLES    2D mode  AoR Diam 2D: 3 5 cm  LA Diam (2D): 4 5 cm  LA/Ao (2D): 1 29  FS (2D Teich): 38 4 %  IVSd (2D): 1 04 cm  LVDEV: 84 cm³  LVESV: 26 cm³  LVIDd(2D): 4 32 cm  LVISd (2D): 2 66 cm  LVPWd (2D): 1 12 cm  SV (Teich): 58 cm³    Apical four chamber  LVEF A4C: 55 %    Apical two chamber  LA Area: 20 7 cm squared  LA Volume: 59 cm³    Unspecified Scan Mode  MV Peak A Cy: 970 mm/s  MV Peak E Cy   Mean: 831 mm/s  MVA (PHT): 3 44 cm squared  PHT: 64 ms  Max P mm[Hg]  V Max: 2470 mm/s  Vmax: 2560 mm/s  RA Area: 16 7 cm squared  RA Volume: 42 1 cm³  TAPSE: 2 7 cm    [employer] Accredited Echocardiography Laboratory    Prepared and electronically signed by    Kim Alexadner DO  Signed 2017 11:38:55       Lab Review   Labs from July 2019 reviewed    Dr Tiffany Barroso MD [facility]      "This note has been constructed using a voice recognition system  Therefore there may be syntax, spelling, and/or grammatical errors   Please call if you have any questions  "

## 2022-04-23 LAB
ALBUMIN SERPL-MCNC: 4.5 G/DL (ref 3.7–4.7)
ALBUMIN/GLOB SERPL: 1.9 {RATIO} (ref 1.2–2.2)
ALP SERPL-CCNC: 71 IU/L (ref 44–121)
ALT SERPL-CCNC: 23 IU/L (ref 0–44)
AST SERPL-CCNC: 21 IU/L (ref 0–40)
BASOPHILS # BLD AUTO: 0.1 X10E3/UL (ref 0–0.2)
BASOPHILS NFR BLD AUTO: 1 %
BILIRUB DIRECT SERPL-MCNC: 0.24 MG/DL (ref 0–0.4)
BILIRUB SERPL-MCNC: 0.9 MG/DL (ref 0–1.2)
BUN SERPL-MCNC: 16 MG/DL (ref 8–27)
BUN/CREAT SERPL: 20 (ref 10–24)
CALCIUM SERPL-MCNC: 9.3 MG/DL (ref 8.6–10.2)
CHLORIDE SERPL-SCNC: 99 MMOL/L (ref 96–106)
CO2 SERPL-SCNC: 22 MMOL/L (ref 20–29)
CREAT SERPL-MCNC: 0.8 MG/DL (ref 0.76–1.27)
EGFR: 93 ML/MIN/1.73
EOSINOPHIL # BLD AUTO: 0.2 X10E3/UL (ref 0–0.4)
EOSINOPHIL NFR BLD AUTO: 3 %
ERYTHROCYTE [DISTWIDTH] IN BLOOD BY AUTOMATED COUNT: 12.9 % (ref 11.6–15.4)
GLOBULIN SER-MCNC: 2.4 G/DL (ref 1.5–4.5)
GLUCOSE SERPL-MCNC: 120 MG/DL (ref 65–99)
HCT VFR BLD AUTO: 42.7 % (ref 37.5–51)
HGB BLD-MCNC: 14.9 G/DL (ref 13–17.7)
IMM GRANULOCYTES # BLD: 0 X10E3/UL (ref 0–0.1)
IMM GRANULOCYTES NFR BLD: 0 %
LYMPHOCYTES # BLD AUTO: 1.8 X10E3/UL (ref 0.7–3.1)
LYMPHOCYTES NFR BLD AUTO: 28 %
MCH RBC QN AUTO: 32 PG (ref 26.6–33)
MCHC RBC AUTO-ENTMCNC: 34.9 G/DL (ref 31.5–35.7)
MCV RBC AUTO: 92 FL (ref 79–97)
MONOCYTES # BLD AUTO: 0.9 X10E3/UL (ref 0.1–0.9)
MONOCYTES NFR BLD AUTO: 14 %
NEUTROPHILS # BLD AUTO: 3.3 X10E3/UL (ref 1.4–7)
NEUTROPHILS NFR BLD AUTO: 54 %
PLATELET # BLD AUTO: 315 X10E3/UL (ref 150–450)
POTASSIUM SERPL-SCNC: 4.3 MMOL/L (ref 3.5–5.2)
PROT SERPL-MCNC: 6.9 G/DL (ref 6–8.5)
RBC # BLD AUTO: 4.65 X10E6/UL (ref 4.14–5.8)
SODIUM SERPL-SCNC: 138 MMOL/L (ref 134–144)
TSH SERPL DL<=0.005 MIU/L-ACNC: 1.53 UIU/ML (ref 0.45–4.5)
WBC # BLD AUTO: 6.3 X10E3/UL (ref 3.4–10.8)

## 2022-04-26 ENCOUNTER — TELEPHONE (OUTPATIENT)
Dept: CARDIOLOGY CLINIC | Facility: CLINIC | Age: 77
End: 2022-04-26

## 2022-04-26 NOTE — TELEPHONE ENCOUNTER
----- Message from Rojelio Clarke MD sent at 4/25/2022  2:45 PM EDT -----  Patient blood test reviewed  They are acceptable  Will continue same medication  Patient should keep his appointment for follow-up

## 2022-05-16 ENCOUNTER — HOSPITAL ENCOUNTER (OUTPATIENT)
Dept: NON INVASIVE DIAGNOSTICS | Facility: HOSPITAL | Age: 77
Discharge: HOME | End: 2022-05-16
Attending: INTERNAL MEDICINE
Payer: COMMERCIAL

## 2022-05-16 DIAGNOSIS — E78.00 HYPERCHOLESTEROLEMIA: ICD-10-CM

## 2022-05-16 DIAGNOSIS — R00.2 PALPITATION: ICD-10-CM

## 2022-05-16 DIAGNOSIS — I11.9 HYPERTENSIVE HEART DISEASE WITHOUT HEART FAILURE: ICD-10-CM

## 2022-05-16 DIAGNOSIS — R01.1 CARDIAC MURMUR: ICD-10-CM

## 2022-05-16 LAB
CHEST PAIN STATEMENT: NORMAL
MAX DIASTOLIC BP: 74 MMHG
MAX HEART RATE: 107 BPM
MAX PREDICTED HEART RATE: 146 BPM
MAX. SYSTOLIC BP: 154 MMHG
PROTOCOL NAME: NORMAL
REASON FOR TERMINATION: NORMAL
TARGET HR FORMULA: NORMAL
TEST INDICATION: NORMAL
TIME IN EXERCISE PHASE: NORMAL

## 2022-05-16 PROCEDURE — 93017 CV STRESS TEST TRACING ONLY: CPT

## 2022-05-16 NOTE — TELEPHONE ENCOUNTER
Pre  Op  Clearance note- Cardiology    Markus Cervantes   78 y o   male  1945      Dr Jennifer Palencia :     Patient's chart was reviewed for preop clearance  Patient was seen in our office on 04/18/22  Patient has past medical history significant for hypertensive heart disease, hypercholesterolemia, and cardiac murmur  Patient is now scheduled for a colonoscopy 6/10/22  Patient has no clinical evidence of  active heart failure or  active ischemia or active arrhythmia  Patient's last cardiac workup including echo and stress test done in 2018 reports were reviewed and it shows normal exercise stress test and normal LV systolic function by echo in 2013  In my opinion patient is in optimum condition for the procedure as planned  Patient is low risk for the surgery as planned from cardiac point of view  Continue current cardiac medications  Patient can hold  Aspirin for  5-7 days as required for surgery  Patient can hold Plavix for 5 days  Patient can hold Eliquis/Xarelto/Pradaxa for 3 days before the procedure  Please restart after the procedure  immediately or next day if no contraindication form surgical point of view and advise patient to contact our office  If you have any question please do not hesitate to call us at our office of [facility]    Phone # [phone]        Lab Results   Component Value Date    WBC 6 3 04/22/2022    HGB 14 9 04/22/2022    HCT 42 7 04/22/2022    MCV 92 04/22/2022     04/22/2022     Lab Results   Component Value Date    CREATININE 0 80 04/22/2022     No results found for: GLUF    Cardiac testing:   Results for orders placed during the hospital encounter of 01/12/17    Echo complete with contrast if indicated    [address]  [address] [address]  [phone]    Transthoracic Echocardiogram  2D, M-mode, Doppler, and Color Doppler    Study date: 2017    Patient: Malika Carr  MR number: [id number]  Account number: [de-identified]  : 1945  Age: 73 years  Gender: Male  Status: Routine  Location: Echo lab  Height: 72 in  Weight: 184 6 lb  BP: 118/ 64 mmHg    Indications: Murmur    Diagnoses: 785 2 - CARDIAC MURMURS NEC    Sonographer:  Sai Chua  Primary Physician:  José Loera DO  Referring Physician:  Valorie Ewing MD  Group:  Luke Garland  Interpreting Physician:  Erasmo Giordano DO    SUMMARY    LEFT VENTRICLE:  Systolic function was normal by visual assessment  Ejection fraction was estimated in the range of 55 % to 60 %  There were no regional wall motion abnormalities  There was mild concentric hypertrophy  Doppler parameters were consistent with abnormal left ventricular relaxation (grade 1 diastolic dysfunction)  MITRAL VALVE:  There was trace regurgitation  AORTIC VALVE:  There was no evidence for stenosis  There was mild to moderate regurgitation  TRICUSPID VALVE:  There was mild to moderate regurgitation  Pulmonary artery systolic pressure was within the normal range  HISTORY: PRIOR HISTORY: HTN, Hyperlipidemia, Pancreatic Cancer    PROCEDURE: The procedure was performed in the echo lab  This was a routine study  The transthoracic approach was used  The study included complete 2D imaging, M-mode, complete spectral Doppler, and color Doppler  The heart rate was 65 bpm,  at the start of the study  Image quality was adequate  LEFT VENTRICLE: Size was normal  Systolic function was normal by visual assessment  Ejection fraction was estimated in the range of 55 % to 60 %  There were no regional wall motion abnormalities  There was mild concentric hypertrophy  DOPPLER: Doppler parameters were consistent with abnormal left ventricular relaxation (grade 1 diastolic dysfunction)      RIGHT VENTRICLE: The size was normal  Systolic function was normal  DOPPLER: Systolic pressure was within the normal range     LEFT ATRIUM: The atrium was mildly dilated  RIGHT ATRIUM: Size was normal     MITRAL VALVE: Valve structure was normal  There was normal leaflet separation  No echocardiographic evidence for prolapse  DOPPLER: The transmitral velocity was within the normal range  There was no evidence for stenosis  There was trace  regurgitation  AORTIC VALVE: The valve was trileaflet  Leaflets exhibited normal cuspal separation and sclerosis  DOPPLER: Transaortic velocity was within the normal range  There was no evidence for stenosis  There was mild to moderate regurgitation  TRICUSPID VALVE: The valve structure was normal  There was normal leaflet separation  DOPPLER: The transtricuspid velocity was within the normal range  There was mild to moderate regurgitation  Pulmonary artery systolic pressure was within  the normal range  Estimated peak PA pressure was 36 mmHg  PULMONIC VALVE: Leaflets exhibited normal thickness, no calcification, and normal cuspal separation  DOPPLER: The transpulmonic velocity was within the normal range  There was no regurgitation  PERICARDIUM: There was no thickening  There was no pericardial effusion  AORTA: The root exhibited normal size  PULMONARY ARTERY: The size was normal  The morphology appeared normal     SYSTEM MEASUREMENT TABLES    2D mode  AoR Diam 2D: 3 5 cm  LA Diam (2D): 4 5 cm  LA/Ao (2D): 1 29  FS (2D Teich): 38 4 %  IVSd (2D): 1 04 cm  LVDEV: 84 cm³  LVESV: 26 cm³  LVIDd(2D): 4 32 cm  LVISd (2D): 2 66 cm  LVPWd (2D): 1 12 cm  SV (Teich): 58 cm³    Apical four chamber  LVEF A4C: 55 %    Apical two chamber  LA Area: 20 7 cm squared  LA Volume: 59 cm³    Unspecified Scan Mode  MV Peak A Cy: 970 mm/s  MV Peak E Cy   Mean: 831 mm/s  MVA (PHT): 3 44 cm squared  PHT: 64 ms  Max P mm[Hg]  V Max: 2470 mm/s  Vmax: 2560 mm/s  RA Area: 16 7 cm squared  RA Volume: 42 1 cm³  TAPSE: 2 7 cm    [facility]    Prepared and electronically signed by    Darwin Arevalo DO  Signed 13-Jan-2017 11:38:55    No results found for this or any previous visit  No results found for this or any previous visit  No results found for this or any previous visit  No results found for this or any previous visit        Geovanna Walls MD [facility]  5/16/2022  8:33 AM

## 2022-05-17 ENCOUNTER — TELEPHONE (OUTPATIENT)
Dept: CARDIOLOGY CLINIC | Facility: CLINIC | Age: 77
End: 2022-05-17

## 2022-05-17 LAB
MAX HR PERCENT: 73 %
MAX HR: 107 BPM
RATE PRESSURE PRODUCT: NORMAL
SL CV STRESS RECOVERY BP: NORMAL MMHG
SL CV STRESS RECOVERY HR: 80 BPM
SL CV STRESS RECOVERY O2 SAT: 97 %
SL CV STRESS STAGE REACHED: 3
STRESS ANGINA INDEX: 0
STRESS BASELINE BP: NORMAL MMHG
STRESS BASELINE HR: 74 BPM
STRESS O2 SAT REST: 98 %
STRESS PEAK HR: 107 BPM
STRESS PERCENT HR: 73 %
STRESS POST ESTIMATED WORKLOAD: 10.1 METS
STRESS POST EXERCISE DUR MIN: 7 MIN
STRESS POST O2 SAT PEAK: 98 %
STRESS POST PEAK BP: 154 MMHG
STRESS TARGET HR: 107 BPM

## 2022-05-17 PROCEDURE — 93016 CV STRESS TEST SUPVJ ONLY: CPT | Performed by: INTERNAL MEDICINE

## 2022-05-17 PROCEDURE — 93018 CV STRESS TEST I&R ONLY: CPT | Performed by: INTERNAL MEDICINE

## 2022-05-17 NOTE — TELEPHONE ENCOUNTER
I was able to leave half of a message before the machine cut me off   Hopefully the patient will call back

## 2022-05-17 NOTE — TELEPHONE ENCOUNTER
----- Message from Martha Dailey MD sent at 5/17/2022  2:20 PM EDT -----  Patient's stress test is okay but he could not get his target heart rate due to his leg pain  He can keep his appointment  Next time we may need to do a nuclear stress test on him

## 2022-06-03 ENCOUNTER — TELEPHONE (OUTPATIENT)
Dept: GASTROENTEROLOGY | Facility: CLINIC | Age: 77
End: 2022-06-03

## 2022-06-03 NOTE — TELEPHONE ENCOUNTER
Spoke to pt confirming his colonoscopy scheduled on 6/10/22 with Dr Magda Flores at [facility]  I informed pt that [facility] would be calling 1-2 days prior with arrival time  I informed of clear liquid diet the day before and to do the bowel cleansing preparation  I informed pt that he will need a  the day of the procedure due to being under sedation  Pt did not have any questions at this time

## 2022-06-10 ENCOUNTER — HOSPITAL ENCOUNTER (OUTPATIENT)
Dept: GASTROENTEROLOGY | Facility: AMBULATORY SURGERY CENTER | Age: 77
Discharge: HOME | End: 2022-06-10
Payer: COMMERCIAL

## 2022-06-10 ENCOUNTER — ANESTHESIA (OUTPATIENT)
Dept: GASTROENTEROLOGY | Facility: AMBULATORY SURGERY CENTER | Age: 77
End: 2022-06-10

## 2022-06-10 ENCOUNTER — ANESTHESIA EVENT (OUTPATIENT)
Dept: GASTROENTEROLOGY | Facility: AMBULATORY SURGERY CENTER | Age: 77
End: 2022-06-10

## 2022-06-10 VITALS
OXYGEN SATURATION: 98 % | HEIGHT: 72 IN | BODY MASS INDEX: 24.52 KG/M2 | SYSTOLIC BLOOD PRESSURE: 123 MMHG | HEART RATE: 61 BPM | RESPIRATION RATE: 18 BRPM | WEIGHT: 181 LBS | TEMPERATURE: 96.4 F | DIASTOLIC BLOOD PRESSURE: 79 MMHG

## 2022-06-10 DIAGNOSIS — Z80.0 FAMILY HISTORY OF COLON CANCER: ICD-10-CM

## 2022-06-10 DIAGNOSIS — Z86.010 HISTORY OF COLON POLYPS: ICD-10-CM

## 2022-06-10 PROCEDURE — 45385 COLONOSCOPY W/LESION REMOVAL: CPT | Performed by: INTERNAL MEDICINE

## 2022-06-10 PROCEDURE — 45380 COLONOSCOPY AND BIOPSY: CPT | Performed by: INTERNAL MEDICINE

## 2022-06-10 RX ORDER — SODIUM CHLORIDE, SODIUM LACTATE, POTASSIUM CHLORIDE, CALCIUM CHLORIDE 600; 310; 30; 20 MG/100ML; MG/100ML; MG/100ML; MG/100ML
INJECTION, SOLUTION INTRAVENOUS CONTINUOUS PRN
Status: DISCONTINUED | OUTPATIENT
Start: 2022-06-10 | End: 2022-06-10

## 2022-06-10 RX ORDER — SODIUM CHLORIDE, SODIUM LACTATE, POTASSIUM CHLORIDE, CALCIUM CHLORIDE 600; 310; 30; 20 MG/100ML; MG/100ML; MG/100ML; MG/100ML
20 INJECTION, SOLUTION INTRAVENOUS CONTINUOUS
Status: DISCONTINUED | OUTPATIENT
Start: 2022-06-10 | End: 2022-06-14 | Stop reason: HOSPADM

## 2022-06-10 RX ORDER — PROPOFOL 10 MG/ML
INJECTION, EMULSION INTRAVENOUS AS NEEDED
Status: DISCONTINUED | OUTPATIENT
Start: 2022-06-10 | End: 2022-06-10

## 2022-06-10 RX ADMIN — PROPOFOL 50 MG: 10 INJECTION, EMULSION INTRAVENOUS at 09:42

## 2022-06-10 RX ADMIN — SODIUM CHLORIDE, SODIUM LACTATE, POTASSIUM CHLORIDE, CALCIUM CHLORIDE: 600; 310; 30; 20 INJECTION, SOLUTION INTRAVENOUS at 09:20

## 2022-06-10 RX ADMIN — PROPOFOL 50 MG: 10 INJECTION, EMULSION INTRAVENOUS at 09:51

## 2022-06-10 RX ADMIN — PROPOFOL 50 MG: 10 INJECTION, EMULSION INTRAVENOUS at 09:38

## 2022-06-10 RX ADMIN — PROPOFOL 50 MG: 10 INJECTION, EMULSION INTRAVENOUS at 09:46

## 2022-06-10 RX ADMIN — PROPOFOL 100 MG: 10 INJECTION, EMULSION INTRAVENOUS at 09:33

## 2022-06-10 NOTE — H&P
History and Physical - [facility]  Kimberly Mccollum 78 y o  male MRN: [id number]                  HPI: Kimberly Mccollum is a 78y o  year old male who presents for colonoscopy due to personal history of colon polyps and family history colon cancer daughter in her 45s father in his [de-identified]  REVIEW OF SYSTEMS: Per the HPI, and otherwise unremarkable      Historical Information   Past Medical History:   Diagnosis Date    BEP (benign enlargement of prostate)     Cancer (HCC)     pancreatic   whipple procedure  basal cell    Cardiac murmur     Colon polyp     Hyperlipidemia     Hypertension     Positive colorectal cancer screening using Cologuard test 01/2021    PVC (premature ventricular contraction) 1990's    hx of, most recent stress test 2018 - normal results per pt    Right bundle branch block      Past Surgical History:   Procedure Laterality Date    APPENDECTOMY      COLONOSCOPY      HERNIA REPAIR      right inguinal x2 and left inguinal x1    PANCREAS SURGERY      SPLENECTOMY, TOTAL      WHIPPLE PROCEDURE/PANCREATICO-DUODENECTOMY       Social History   Social History     Substance and Sexual Activity   Alcohol Use Yes    Alcohol/week: 10 0 standard drinks    Types: 10 Cans of beer per week    Comment: 5-6 daily     Social History     Substance and Sexual Activity   Drug Use No     Social History     Tobacco Use   Smoking Status Never Smoker   Smokeless Tobacco Never Used     Family History   Problem Relation Age of Onset    Hypertension Mother     Hypertension Father     Cancer Father     Cancer Daughter        Meds/Allergies       Current Outpatient Medications:     alfuzosin (UROXATRAL) 10 mg 24 hr tablet    amLODIPine (NORVASC) 10 mg tablet    Ascorbic Acid (vitamin C) 1000 MG tablet    aspirin 81 mg chewable tablet    carvedilol (COREG) 6 25 mg tablet    cholecalciferol (VITAMIN D3) 1,000 units tablet    rosuvastatin (CRESTOR) 5 mg tablet   spironolactone-hydrochlorothiazide (ALDACTAZIDE) 25-25 mg per tablet  No current facility-administered medications for this encounter  Facility-Administered Medications Ordered in Other Encounters:     lactated ringers infusion, , Intravenous, Continuous PRN, New Bag at 06/10/22 0920    No Known Allergies    Objective     /75   Pulse 71   Temp (!) 96 4 °F (35 8 °C) (Temporal)   Resp 20   Ht 6' (1 829 m)   Wt 82 1 kg (181 lb)   SpO2 98%   BMI 24 55 kg/m²       PHYSICAL EXAM    Gen: NAD  Head: NCAT  CV: RRR  CHEST: Clear  ABD: soft, NT/ND  EXT: no edema      ASSESSMENT/PLAN:  This is a 78y o  year old male here for colonoscopy, and he is stable and optimized for his procedure

## 2022-06-10 NOTE — ANESTHESIA POSTPROCEDURE EVALUATION
Post-Op Assessment Note    CV Status:  Stable  Pain Score: 0    Pain management: adequate     Mental Status:  Arousable and sleepy   Hydration Status:  Stable   PONV Controlled:  Controlled   Airway Patency:  Patent      Post Op Vitals Reviewed: Yes      Staff: CRNA         No complications documented      BP   108/62   Temp  97 6   Pulse 77   Resp 14   SpO2 99

## 2022-06-10 NOTE — ANESTHESIA PREPROCEDURE EVALUATION
Procedure:  COLONOSCOPY    Relevant Problems   CARDIO   (+) Cardiac murmur   (+) Chest pain   (+) Essential hypertension   (+) Hypercholesterolemia      /RENAL   (+) Benign prostatic hyperplasia with urinary obstruction      Other   (+) Pancreatic mass      HTN, HLD, RBBB  Pancreatic cancer s/p whipple and splenectomy    Stress ECG 5/16/22:   No acute ST changes to 83% of MPHR  RBBB seen  ECG equivocal for ischemia  Could not reach target HR due to leg pain  Echo 1/12/2017:   Normal LV systolic function with EF 55-60%, no regional wall motion abnormalities, mild LVH, Grade 1 DD  Mild/moderate aortic regurgitation, mild/moderate tricuspid regurgitation  Physical Exam    Airway    Mallampati score: II  TM Distance: >3 FB  Neck ROM: full     Dental       Cardiovascular      Pulmonary      Other Findings        Anesthesia Plan  ASA Score- 3     Anesthesia Type- IV sedation with anesthesia with ASA Monitors  Additional Monitors:   Airway Plan:           Plan Factors-Exercise tolerance (METS): >4 METS  Chart reviewed  Patient summary reviewed  Patient is not a current smoker  Obstructive sleep apnea risk education given perioperatively  Induction- intravenous  Postoperative Plan-     Informed Consent- Anesthetic plan and risks discussed with patient  I personally reviewed this patient with the CRNA  Discussed and agreed on the Anesthesia Plan with the CRNA  Ephraim Hou

## 2022-07-17 DIAGNOSIS — I10 ESSENTIAL HYPERTENSION: ICD-10-CM

## 2022-07-18 RX ORDER — SPIRONOLACTONE AND HYDROCHLOROTHIAZIDE 25; 25 MG/1; MG/1
TABLET ORAL
Qty: 45 TABLET | Refills: 3 | Status: SHIPPED | OUTPATIENT
Start: 2022-07-18

## 2022-09-20 ENCOUNTER — 6 MONTH FOLLOW UP (OUTPATIENT)
Dept: URBAN - METROPOLITAN AREA CLINIC 27 | Facility: CLINIC | Age: 77
End: 2022-09-20

## 2022-09-20 DIAGNOSIS — H25.9: ICD-10-CM

## 2022-09-20 DIAGNOSIS — H33.302: ICD-10-CM

## 2022-09-20 DIAGNOSIS — H43.813: ICD-10-CM

## 2022-09-20 PROCEDURE — 92014 COMPRE OPH EXAM EST PT 1/>: CPT

## 2022-09-20 PROCEDURE — 92201 OPSCPY EXTND RTA DRAW UNI/BI: CPT

## 2022-09-20 ASSESSMENT — VISUAL ACUITY
OD_CC: 20/25+1
OS_CC: 20/25-2

## 2022-09-20 ASSESSMENT — TONOMETRY
OS_IOP_MMHG: 17
OD_IOP_MMHG: 17

## 2022-10-06 ENCOUNTER — OFFICE VISIT (OUTPATIENT)
Dept: CARDIOLOGY CLINIC | Facility: CLINIC | Age: 77
End: 2022-10-06
Payer: COMMERCIAL

## 2022-10-06 VITALS
OXYGEN SATURATION: 97 % | BODY MASS INDEX: 24.11 KG/M2 | WEIGHT: 178 LBS | DIASTOLIC BLOOD PRESSURE: 82 MMHG | HEIGHT: 72 IN | HEART RATE: 70 BPM | TEMPERATURE: 97 F | SYSTOLIC BLOOD PRESSURE: 140 MMHG

## 2022-10-06 DIAGNOSIS — R01.1 CARDIAC MURMUR: ICD-10-CM

## 2022-10-06 DIAGNOSIS — K86.89 PANCREATIC MASS: ICD-10-CM

## 2022-10-06 DIAGNOSIS — I10 ESSENTIAL HYPERTENSION: ICD-10-CM

## 2022-10-06 DIAGNOSIS — E78.00 HYPERCHOLESTEROLEMIA: ICD-10-CM

## 2022-10-06 DIAGNOSIS — I11.9 HYPERTENSIVE HEART DISEASE WITHOUT HEART FAILURE: ICD-10-CM

## 2022-10-06 DIAGNOSIS — R00.2 PALPITATION: ICD-10-CM

## 2022-10-06 PROCEDURE — 99214 OFFICE O/P EST MOD 30 MIN: CPT | Performed by: INTERNAL MEDICINE

## 2022-10-06 RX ORDER — CARVEDILOL 6.25 MG/1
9.38 TABLET ORAL 2 TIMES DAILY
Qty: 210 TABLET | Refills: 3 | Status: SHIPPED | OUTPATIENT
Start: 2022-10-06

## 2022-10-06 NOTE — PROGRESS NOTES
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  Blood pressure is much better but still around 972 systolic and 70 diastolic  Will increase carvedilol to 9 375 twice a day total dose of 18 75 mg  Continue taking Aldactazide half tablet daily and amlodipine 10 mg daily  3  Palpitations  Palpitation much problem  4  Dyslipidemia  Continue Crestor 5 mg  He has no cholesterol test will check fasting lipid LFTs  5  History of BPH on Flomax  He is tolerating it very well  Management as per Urology    6  Cardiac murmur  Patient has  2/6 ejection systolic murmur  Previous echo has shown no significant change  Patient has no clinical change in  His cardiac enzyme  Echo from 2017 was acceptable will continue to monitor     7  History of Whipple procedure follows with [facility] will restart please contact with them  plan  Routine blood test of CMP CBC fasting lipid TSH  Exercise stress due to risk factors   follow-up 6-9 months      Patient was advised and educated to call our office  immediately if  patient has any new symptoms of chest pain/shortness of breath, near-syncope, syncope, light headedness sustained palpitations  or any other cardiovascular symptoms before their scheduled follow-up appointment  Office #[phone]  follow-up in 6 months      Counseling :  A description of the counseling    Regular exercise, education about pathophysiology of coronary artery disease discussed with patient at length  Patient's ability to self care: Yes  Medication side effect reviewed with patient in detail and all their questions answered to their satisfaction  HPI :     Jada Thomason is a 78y o  year old male who came for follow up  Patient has with a past medical history significant for borderline hypertension, incomplete RBBB, pancreatic cancer status post Whipple procedure at [facility], palpitations, dyslipidemia and BPH who came for regular follow-up  He's been doing pretty well in fact he has gained around 20 pounds  Denies any chest pain  Any shortness of breath or any other significant complaint  last cardiac workup was in 2013 which shows he had normal LV function mild MR and trace AI  He is scheduled to go to [facility] for follow-up on his Whipple procedure      10/01/2020  Above reviewed  Patient came for follow-up  He is doing well  He has recovered well from his valve uses and incarcerated hernia  He is doing well denies any chest pain any shortness of breath today heart rate is 67 beats per minute but blood pressure on arrival was found to be slightly elevated  His doing well from cardiac point of view  No chest pain no shortness of breath no dizziness no lightheadedness no nausea no vomiting no PND no orthopnea  He has medical history significant for essential hypertension, dyslipidemia, history of Whipple procedure for pancreatic issues  No other significant complaint at this time  Blood pressure checked by me was 138/78  Patient was recently started on prostate medications  He has been compliant with his cholesterol medication but no recent blood test       10/22/2021  Above reviewed  Patient came for follow-up he is doing well with change in medication is blood pressure is now acceptable  His heart rate is 73 beats per minute    He has medical history significant for essential hypertension, dyslipidemia, history of pancreatic mass status post Whipple procedure, who came for follow-up  He has no new complaints his blood test from January 2021 by acceptable  No nausea no vomiting no fever no chills no PND no orthopnea no other cardiovascular issues at this time  His stress test in 2018 and echo Doppler in 2017 were acceptable  04/18/2022  Above reviewed  Patient came for follow-up he is doing well he has no new complaints  He had a medical history significant for essential hypertension, dyslipidemia, history of pancreatic mass status post Whipple procedure who came for follow-up  No nausea no vomiting no fever no chills his last blood test was in January 2021 which was acceptable  He has his stress test and echo Doppler done in 2018 and 17  Today's EKG shows no changes from previous EKG  Occasionally have palpitations but they are not much of a problem at this time       10/06/2022    Above reviewed  Patient came for follow-up  He is doing well no new complaints  His medical history significant for essential hypertension, dyslipidemia, history of pancreatic mass status post Whipple procedure  He is doing well from cardiac point of view  He could not completed exercise stress test as he start having knee pain  He exercise to achieve heart rate 70 3% maximum predicted heart rate  Blood test done April 2022 has been stable  Today EKG shows heart rate 70 beats per minute  No other cardiovascular issues at this time  Review of Systems   Constitutional: Negative for activity change, chills, diaphoresis, fever and unexpected weight change  HENT: Negative for congestion  Eyes: Negative for discharge and redness  Respiratory: Negative for cough, chest tightness, shortness of breath and wheezing  Cardiovascular: Negative  Negative for chest pain, palpitations and leg swelling  Gastrointestinal: Negative for abdominal pain, diarrhea and nausea  Endocrine: Negative      Genitourinary: Negative for decreased urine volume and urgency  Musculoskeletal: Negative  Negative for arthralgias, back pain and gait problem  Skin: Negative for rash and wound  Allergic/Immunologic: Negative  Neurological: Negative for dizziness, seizures, syncope, weakness, light-headedness and headaches  Hematological: Negative  Psychiatric/Behavioral: Negative for agitation and confusion  The patient is not nervous/anxious  Historical Information   Past Medical History:   Diagnosis Date    BEP (benign enlargement of prostate)     Cancer ([address] )     pancreatic   whipple procedure  basal cell    Cardiac murmur     Colon polyp     Hyperlipidemia     Hypertension     Positive colorectal cancer screening using Cologuard test 01/2021    PVC (premature ventricular contraction) 1990's    hx of, most recent stress test 2018 - normal results per pt    Right bundle branch block      Past Surgical History:   Procedure Laterality Date    APPENDECTOMY      COLONOSCOPY      HERNIA REPAIR      right inguinal x2 and left inguinal x1    PANCREAS SURGERY      SPLENECTOMY, TOTAL      WHIPPLE PROCEDURE/PANCREATICO-DUODENECTOMY       Social History     Substance and Sexual Activity   Alcohol Use Yes    Alcohol/week: 10 0 standard drinks    Types: 10 Cans of beer per week    Comment: 5-6 daily     Social History     Substance and Sexual Activity   Drug Use No     Social History     Tobacco Use   Smoking Status Never Smoker   Smokeless Tobacco Never Used     Family History:   Family History   Problem Relation Age of Onset    Hypertension Mother     Hypertension Father     Cancer Father     Cancer Daughter        Meds/Allergies     No Known Allergies    Current Outpatient Medications:     alfuzosin (UROXATRAL) 10 mg 24 hr tablet, Take 10 mg by mouth daily  , Disp: , Rfl:     amLODIPine (NORVASC) 10 mg tablet, TAKE 1 TABLET BY MOUTH  DAILY, Disp: 90 tablet, Rfl: 3    Ascorbic Acid (vitamin C) 1000 MG tablet, Take 1,000 mg by mouth daily, Disp: , Rfl:     aspirin 81 mg chewable tablet, Chew 81 mg daily  , Disp: , Rfl:     carvedilol (COREG) 6 25 mg tablet, Take 1 5 tablets (9 375 mg total) by mouth 2 (two) times a day, Disp: 210 tablet, Rfl: 3    cholecalciferol (VITAMIN D3) 1,000 units tablet, Take 5,000 Units by mouth daily, Disp: , Rfl:     rosuvastatin (CRESTOR) 5 mg tablet, TAKE 1 TABLET BY MOUTH  DAILY, Disp: 90 tablet, Rfl: 3    spironolactone-hydrochlorothiazide (ALDACTAZIDE) 25-25 mg per tablet, TAKE ONE-HALF TABLET BY  MOUTH DAILY, Disp: 45 tablet, Rfl: 3    Vitals: Blood pressure 140/82, pulse 70, temperature (!) 97 °F (36 1 °C), height 6' (1 829 m), weight 80 7 kg (178 lb), SpO2 97 %  ?  Body mass index is 24 14 kg/m²  Vitals:    10/06/22 1332   Weight: 80 7 kg (178 lb)     BP Readings from Last 3 Encounters:   10/06/22 140/82   06/10/22 123/79   04/18/22 138/80         Physical Exam  Constitutional:       General: He is not in acute distress  Appearance: He is well-developed  He is not diaphoretic  Neck:      Thyroid: No thyromegaly  Vascular: No JVD  Trachea: No tracheal deviation  Cardiovascular:      Rate and Rhythm: Normal rate and regular rhythm  Heart sounds: S1 normal and S2 normal  Heart sounds not distant  Murmur heard  Systolic (ejection) murmur is present with a grade of 2/6  No friction rub  No gallop  No S3 or S4 sounds  Pulmonary:      Effort: Pulmonary effort is normal  No respiratory distress  Breath sounds: Normal breath sounds  No wheezing or rales  Chest:      Chest wall: No tenderness  Abdominal:      General: Bowel sounds are normal  There is no distension  Palpations: Abdomen is soft  Tenderness: There is no abdominal tenderness  Musculoskeletal:         General: No deformity  Cervical back: Neck supple  Skin:     General: Skin is warm and dry  Coloration: Skin is not pale  Findings: No rash     Neurological:      Mental Status: He is alert and oriented to person, place, and time  Psychiatric:         Behavior: Behavior normal          Judgment: Judgment normal        Diagnostic Studies Review Cardio:  Stress Test: Stress test in 2013 shows maximum elective exercise stress test patient achieved 86% of his bradycardic heart rate  Repeat exercise stress test done 05/02/2018 was normal   Patient exercised for 10 minutes  Echocardiogram/YOANA: Echo Doppler in 2013 shows normal LV systolic function grade 1 diastolic dysfunction and trace AI mild MR echo Doppler in January 2016 shows EF 55-60%, mild concentric left hypertrophy, mild AI, mild TR PA pressure 35 mm of mercury  Vascular imaging: Carotid Doppler done in 2014 shows no significant carotid artery stenosis bilaterally  ECG Report: EKG shows normal sinus rhythm heart rate 73 bpm this was done in April 2016  RSR pattern, probably normal auzkuca0012/06/2017  Twelve lead EKG shows normal sinus rhythm heart rate 64 beats per minute incomplete RBBB  Twelve lead EKG in our office shows normal sinus rhythm heart rate 68 beats per minute  RSR pattern  No change from old EKG  Twelve lead EKG 10/29/2019 shows normal sinus rhythm heart rate 71 beats per minute  No significant ST changes no change from old EKG  Twelve lead EKG 10/01/2020 shows normal sinus rhythm heart rate 67 beats per minute no significant ST changes no change from old EKG  Twelve lead EKG 12/15/2020 shows normal sinus rhythm left axis deviation  Q-wave in inferior lead probably due to micro are no change from old EKG  Twelve lead EKG 03/19/2021 shows normal sinus rhythm heart rate 70 beats per minute  Twelve lead EKG 10/22/2021 shows normal sinus rhythm heart rate 73 beats per minute small Q in inferior lead most likely pseudo infarct pattern no change from previous EKG  Lead EKG 04/18/2022 shows normal sinus rhythm heart rate 69 beats per minute  Left axis    Q-wave noted in inferior lead most likely with pseudo infarct pattern  No significant change from previous EKG  Cardiac testing:   Results for orders placed during the hospital encounter of 17   Echo complete with contrast if indicated    Narrative [place] [address]  [address] [facility]  [place] [address]  [phone]    Transthoracic Echocardiogram  2D, M-mode, Doppler, and Color Doppler    Study date:  2017    Patient: Jason Hurt  MR number: [id number]  Account number: [de-identified]  : 1945  Age: 73 years  Gender: Male  Status: Routine  Location: Echo lab  Height: 72 in  Weight: 184 6 lb  BP: 118/ 64 mmHg    Indications: Murmur    Diagnoses: 785 2 - CARDIAC MURMURS NEC    Sonographer:  Sai Phillip  Primary Physician:  Nicholos Boas, DO  Referring Physician:  Andrei Dorantes MD  Group:  Brittanie Dorsey  Interpreting Physician:  Joy Johnson DO    SUMMARY    LEFT VENTRICLE:  Systolic function was normal by visual assessment  Ejection fraction was estimated in the range of 55 % to 60 %  There were no regional wall motion abnormalities  There was mild concentric hypertrophy  Doppler parameters were consistent with abnormal left ventricular relaxation (grade 1 diastolic dysfunction)  MITRAL VALVE:  There was trace regurgitation  AORTIC VALVE:  There was no evidence for stenosis  There was mild to moderate regurgitation  TRICUSPID VALVE:  There was mild to moderate regurgitation  Pulmonary artery systolic pressure was within the normal range  HISTORY: PRIOR HISTORY: HTN, Hyperlipidemia, Pancreatic Cancer    PROCEDURE: The procedure was performed in the echo lab  This was a routine study  The transthoracic approach was used  The study included complete 2D imaging, M-mode, complete spectral Doppler, and color Doppler  The heart rate was 65 bpm,  at the start of the study  Image quality was adequate      LEFT VENTRICLE: Size was normal  Systolic function was normal by visual assessment  Ejection fraction was estimated in the range of 55 % to 60 %  There were no regional wall motion abnormalities  There was mild concentric hypertrophy  DOPPLER: Doppler parameters were consistent with abnormal left ventricular relaxation (grade 1 diastolic dysfunction)  RIGHT VENTRICLE: The size was normal  Systolic function was normal  DOPPLER: Systolic pressure was within the normal range  LEFT ATRIUM: The atrium was mildly dilated  RIGHT ATRIUM: Size was normal     MITRAL VALVE: Valve structure was normal  There was normal leaflet separation  No echocardiographic evidence for prolapse  DOPPLER: The transmitral velocity was within the normal range  There was no evidence for stenosis  There was trace  regurgitation  AORTIC VALVE: The valve was trileaflet  Leaflets exhibited normal cuspal separation and sclerosis  DOPPLER: Transaortic velocity was within the normal range  There was no evidence for stenosis  There was mild to moderate regurgitation  TRICUSPID VALVE: The valve structure was normal  There was normal leaflet separation  DOPPLER: The transtricuspid velocity was within the normal range  There was mild to moderate regurgitation  Pulmonary artery systolic pressure was within  the normal range  Estimated peak PA pressure was 36 mmHg  PULMONIC VALVE: Leaflets exhibited normal thickness, no calcification, and normal cuspal separation  DOPPLER: The transpulmonic velocity was within the normal range  There was no regurgitation  PERICARDIUM: There was no thickening  There was no pericardial effusion  AORTA: The root exhibited normal size      PULMONARY ARTERY: The size was normal  The morphology appeared normal     SYSTEM MEASUREMENT TABLES    2D mode  AoR Diam 2D: 3 5 cm  LA Diam (2D): 4 5 cm  LA/Ao (2D): 1 29  FS (2D Teich): 38 4 %  IVSd (2D): 1 04 cm  LVDEV: 84 cm³  LVESV: 26 cm³  LVIDd(2D): 4 32 cm  LVISd (2D): 2 66 cm  LVPWd (2D): 1 12 cm  SV (Teich): 58 cm³    Apical four chamber  LVEF A4C: 55 %    Apical two chamber  LA Area: 20 7 cm squared  LA Volume: 59 cm³    Unspecified Scan Mode  MV Peak A Cy: 970 mm/s  MV Peak E Cy  Mean: 831 mm/s  MVA (PHT): 3 44 cm squared  PHT: 64 ms  Max P mm[Hg]  V Max: 2470 mm/s  Vmax: 2560 mm/s  RA Area: 16 7 cm squared  RA Volume: 42 1 cm³  TAPSE: 2 7 cm    [employer] Accredited Echocardiography Laboratory    Prepared and electronically signed by    Eben Roche DO  Signed 2017 11:38:55       Lab Review   Labs from 2019 reviewed    Dr Beth Lui MD [facility]      "This note has been constructed using a voice recognition system  Therefore there may be syntax, spelling, and/or grammatical errors   Please call if you have any questions  "

## 2022-10-15 LAB
ALBUMIN SERPL-MCNC: 4.5 G/DL (ref 3.7–4.7)
ALBUMIN/GLOB SERPL: 1.7 {RATIO} (ref 1.2–2.2)
ALP SERPL-CCNC: 81 IU/L (ref 44–121)
ALT SERPL-CCNC: 29 IU/L (ref 0–44)
AST SERPL-CCNC: 23 IU/L (ref 0–40)
BASOPHILS # BLD AUTO: 0.1 X10E3/UL (ref 0–0.2)
BASOPHILS NFR BLD AUTO: 2 %
BILIRUB SERPL-MCNC: 0.7 MG/DL (ref 0–1.2)
BUN SERPL-MCNC: 13 MG/DL (ref 8–27)
BUN/CREAT SERPL: 16 (ref 10–24)
CALCIUM SERPL-MCNC: 9.3 MG/DL (ref 8.6–10.2)
CHLORIDE SERPL-SCNC: 100 MMOL/L (ref 96–106)
CHOLEST SERPL-MCNC: 159 MG/DL (ref 100–199)
CO2 SERPL-SCNC: 22 MMOL/L (ref 20–29)
CREAT SERPL-MCNC: 0.8 MG/DL (ref 0.76–1.27)
EGFR: 92 ML/MIN/1.73
EOSINOPHIL # BLD AUTO: 0.5 X10E3/UL (ref 0–0.4)
EOSINOPHIL NFR BLD AUTO: 8 %
ERYTHROCYTE [DISTWIDTH] IN BLOOD BY AUTOMATED COUNT: 12.9 % (ref 11.6–15.4)
GLOBULIN SER-MCNC: 2.6 G/DL (ref 1.5–4.5)
GLUCOSE SERPL-MCNC: 119 MG/DL (ref 70–99)
HCT VFR BLD AUTO: 45.1 % (ref 37.5–51)
HDLC SERPL-MCNC: 58 MG/DL
HGB BLD-MCNC: 15.7 G/DL (ref 13–17.7)
IMM GRANULOCYTES # BLD: 0 X10E3/UL (ref 0–0.1)
IMM GRANULOCYTES NFR BLD: 0 %
LDLC SERPL CALC-MCNC: 87 MG/DL (ref 0–99)
LYMPHOCYTES # BLD AUTO: 1.9 X10E3/UL (ref 0.7–3.1)
LYMPHOCYTES NFR BLD AUTO: 31 %
MCH RBC QN AUTO: 31.2 PG (ref 26.6–33)
MCHC RBC AUTO-ENTMCNC: 34.8 G/DL (ref 31.5–35.7)
MCV RBC AUTO: 90 FL (ref 79–97)
MONOCYTES # BLD AUTO: 1 X10E3/UL (ref 0.1–0.9)
MONOCYTES NFR BLD AUTO: 17 %
NEUTROPHILS # BLD AUTO: 2.5 X10E3/UL (ref 1.4–7)
NEUTROPHILS NFR BLD AUTO: 42 %
PLATELET # BLD AUTO: 337 X10E3/UL (ref 150–450)
POTASSIUM SERPL-SCNC: 4.7 MMOL/L (ref 3.5–5.2)
PROT SERPL-MCNC: 7.1 G/DL (ref 6–8.5)
RBC # BLD AUTO: 5.04 X10E6/UL (ref 4.14–5.8)
SL AMB VLDL CHOLESTEROL CALC: 14 MG/DL (ref 5–40)
SODIUM SERPL-SCNC: 139 MMOL/L (ref 134–144)
TRIGL SERPL-MCNC: 75 MG/DL (ref 0–149)
TSH SERPL DL<=0.005 MIU/L-ACNC: 1.96 UIU/ML (ref 0.45–4.5)
WBC # BLD AUTO: 6 X10E3/UL (ref 3.4–10.8)

## 2023-03-02 DIAGNOSIS — E78.00 HYPERCHOLESTEROLEMIA: ICD-10-CM

## 2023-03-02 DIAGNOSIS — I10 ESSENTIAL HYPERTENSION: ICD-10-CM

## 2023-03-03 RX ORDER — AMLODIPINE BESYLATE 10 MG/1
TABLET ORAL
Qty: 90 TABLET | Refills: 3 | Status: SHIPPED | OUTPATIENT
Start: 2023-03-03

## 2023-03-03 RX ORDER — ROSUVASTATIN CALCIUM 5 MG/1
TABLET, COATED ORAL
Qty: 90 TABLET | Refills: 3 | Status: SHIPPED | OUTPATIENT
Start: 2023-03-03

## 2023-05-30 ENCOUNTER — OFFICE VISIT (OUTPATIENT)
Dept: CARDIOLOGY CLINIC | Facility: CLINIC | Age: 78
End: 2023-05-30

## 2023-05-30 VITALS
HEART RATE: 58 BPM | OXYGEN SATURATION: 98 % | HEIGHT: 72 IN | WEIGHT: 177 LBS | BODY MASS INDEX: 23.98 KG/M2 | SYSTOLIC BLOOD PRESSURE: 128 MMHG | DIASTOLIC BLOOD PRESSURE: 70 MMHG

## 2023-05-30 DIAGNOSIS — E78.00 HYPERCHOLESTEROLEMIA: ICD-10-CM

## 2023-05-30 DIAGNOSIS — R00.2 PALPITATION: ICD-10-CM

## 2023-05-30 DIAGNOSIS — I11.9 HYPERTENSIVE HEART DISEASE WITHOUT HEART FAILURE: ICD-10-CM

## 2023-05-30 DIAGNOSIS — R01.1 CARDIAC MURMUR: ICD-10-CM

## 2023-05-30 DIAGNOSIS — K86.89 PANCREATIC MASS: ICD-10-CM

## 2023-05-30 NOTE — PROGRESS NOTES
Progress Note - Cardiology Office  Tena Castillo 78 y o  male MRN: [id number]  : 1945  Encounter: [id number]      Assessment:     1  Hypertensive heart disease without heart failure    2  Cardiac murmur    3  Palpitation    4  Hypercholesterolemia    5  Pancreatic mass        Discussion Summary and Plan:  1  History of pancreatic cancer status post Whipple procedure  Patient regularly follows up at [facility]  Has good appetite  He follows with [facility]  No issues  [facility] note from 2023 of Dr Eda Guillory reviewed  Mr Sloan Jackson is a 78 y o  male with history of well-differentiated pancreatic neuroendocrine tumor, who underwent robotic converted to open distal pancreatectomy on 2016 by Dr Zoey Goodwin  He returns today for interval surveillance, now almost 7 years out from curative-intent surgery  We reviewed his CT from yesterday, which demonstrates no evidence of recurrent or metastatic disease  There is a stable cystic lesion in the liver that was present prior to surgery  An MRI would provide further information but given it has been stable for many years, I do not think this is necessary at this time  I recommended repeating imaging at 10 years from surgery, and we can obtain an MRI instead of CT at that time  If this remains stable we can discontinue routine surveillance with imaging and switch to symptom-based monitoring  He is most likely cured and the risk of recurrence beyond 10 years is very low  2     Hypertensive heart disease without heart failure  Pressure have significantly improved  We will continue Coreg to 18 75 mg total dose, continue Lopressor at half tablet and amlodipine 10 mg daily  Electrolytes are stable  3  Palpitations  Palpitation much problem  Heart rate 58 bpm    4  Dyslipidemia  Continue Crestor 5 mg  Rest of profile in 2022 was stable  5  History of BPH on Flomax  He is tolerating it very well    Management as per Urology    6  Cardiac murmur  Patient has  2/6 ejection systolic murmur  Previous echo has shown no significant change  Patient has no clinical change in  His cardiac enzyme  Echo from 2017 was acceptable will continue to monitor     7  History of Whipple procedure follows with [facility] will restart please contact with them  plan  follow-up 9 months      Patient was advised and educated to call our office  immediately if  patient has any new symptoms of chest pain/shortness of breath, near-syncope, syncope, light headedness sustained palpitations  or any other cardiovascular symptoms before their scheduled follow-up appointment  Office #[phone]  follow-up in 6 months      Counseling :  A description of the counseling  Regular exercise, education about pathophysiology of coronary artery disease discussed with patient at length  Patient's ability to self care: Yes  Medication side effect reviewed with patient in detail and all their questions answered to their satisfaction  HPI :     Silver Barbosa is a 78y o  year old male who came for follow up  Patient has with a past medical history significant for borderline hypertension, incomplete RBBB, pancreatic cancer status post Whipple procedure at [facility], palpitations, dyslipidemia and BPH who came for regular follow-up  He's been doing pretty well in fact he has gained around 20 pounds  Denies any chest pain  Any shortness of breath or any other significant complaint  last cardiac workup was in 2013 which shows he had normal LV function mild MR and trace AI  He is scheduled to go to [facility] for follow-up on his Whipple procedure      10/01/2020  Above reviewed  Patient came for follow-up  He is doing well  He has recovered well from his valve uses and incarcerated hernia    He is doing well denies any chest pain any shortness of breath today heart rate is 67 beats per minute but blood pressure on arrival was found to be slightly elevated  His doing well from cardiac point of view  No chest pain no shortness of breath no dizziness no lightheadedness no nausea no vomiting no PND no orthopnea  He has medical history significant for essential hypertension, dyslipidemia, history of Whipple procedure for pancreatic issues  No other significant complaint at this time  Blood pressure checked by me was 138/78  Patient was recently started on prostate medications  He has been compliant with his cholesterol medication but no recent blood test       10/06/2022    Above reviewed  Patient came for follow-up  He is doing well no new complaints  His medical history significant for essential hypertension, dyslipidemia, history of pancreatic mass status post Whipple procedure  He is doing well from cardiac point of view  He could not completed exercise stress test as he start having knee pain  He exercise to achieve heart rate 70 3% maximum predicted heart rate  Blood test done April 2022 has been stable  Today EKG shows heart rate 70 beats per minute  No other cardiovascular issues at this time  5/30/2023  Above reviewed  Patient came for follow-up he is doing well he has no new complaints  He is feeling great  His medical history significant for essential hypertension, dyslipidemia, history of pancreatic mass s/p Whipple procedure and was found to have benign mass  From cardiac point of view he has no nausea no vomiting no fever no chills no other cardiovascular issues  EKG today shows heart rate 58 bpm no change from previous EKG  His blood test in October 2022 was stable Cresta profile and labs were reviewed  His med list reviewed he continues take his blood pressure medicine along with Crestor  He is on Coreg 9 375 twice a day, amlodipine 10 mg daily, Crestor and Dyazide half tablet daily  Blood pressure has been stable since then        Review of Systems   Constitutional: Negative for activity change, chills, diaphoresis, fever and unexpected weight change  HENT: Negative for congestion  Eyes: Negative for discharge and redness  Respiratory: Negative for cough, chest tightness, shortness of breath and wheezing  Cardiovascular: Negative  Negative for chest pain, palpitations and leg swelling  Gastrointestinal: Negative for abdominal pain, diarrhea and nausea  Endocrine: Negative  Genitourinary: Negative for decreased urine volume and urgency  Musculoskeletal: Negative  Negative for arthralgias, back pain and gait problem  Skin: Negative for rash and wound  Allergic/Immunologic: Negative  Neurological: Negative for dizziness, seizures, syncope, weakness, light-headedness and headaches  Hematological: Negative  Psychiatric/Behavioral: Negative for agitation and confusion  The patient is not nervous/anxious          Historical Information   Past Medical History:   Diagnosis Date   • BEP (benign enlargement of prostate)    • Cancer ([facility] [address] )     pancreatic   whipple procedure  basal cell   • Cardiac murmur    • Colon polyp    • Hyperlipidemia    • Hypertension    • Positive colorectal cancer screening using Cologuard test 01/2021   • PVC (premature ventricular contraction) 1990's    hx of, most recent stress test 2018 - normal results per pt   • Right bundle branch block      Past Surgical History:   Procedure Laterality Date   • APPENDECTOMY     • COLONOSCOPY     • HERNIA REPAIR      right inguinal x2 and left inguinal x1   • PANCREAS SURGERY     • SPLENECTOMY, TOTAL     • WHIPPLE PROCEDURE/PANCREATICO-DUODENECTOMY       Social History     Substance and Sexual Activity   Alcohol Use Yes   • Alcohol/week: 10 0 standard drinks of alcohol   • Types: 10 Cans of beer per week    Comment: 5-6 daily     Social History     Substance and Sexual Activity   Drug Use No     Social History     Tobacco Use   Smoking Status Never   Smokeless Tobacco Never     Family History:   Family History Problem Relation Age of Onset   • Hypertension Mother    • Hypertension Father    • Cancer Father    • Cancer Daughter        Meds/Allergies     No Known Allergies    Current Outpatient Medications:   •  alfuzosin (UROXATRAL) 10 mg 24 hr tablet, Take 10 mg by mouth daily  , Disp: , Rfl:   •  amLODIPine (NORVASC) 10 mg tablet, TAKE 1 TABLET BY MOUTH  DAILY, Disp: 90 tablet, Rfl: 3  •  Ascorbic Acid (vitamin C) 1000 MG tablet, Take 1,000 mg by mouth daily, Disp: , Rfl:   •  aspirin 81 mg chewable tablet, Chew 81 mg daily  , Disp: , Rfl:   •  carvedilol (COREG) 6 25 mg tablet, Take 1 5 tablets (9 375 mg total) by mouth 2 (two) times a day, Disp: 210 tablet, Rfl: 3  •  cholecalciferol (VITAMIN D3) 1,000 units tablet, Take 5,000 Units by mouth daily, Disp: , Rfl:   •  rosuvastatin (CRESTOR) 5 mg tablet, TAKE 1 TABLET BY MOUTH  DAILY, Disp: 90 tablet, Rfl: 3  •  spironolactone-hydrochlorothiazide (ALDACTAZIDE) 25-25 mg per tablet, TAKE ONE-HALF TABLET BY  MOUTH DAILY, Disp: 45 tablet, Rfl: 3    Vitals: Blood pressure 128/70, pulse 58, height 6' (1 829 m), weight 80 3 kg (177 lb), SpO2 98 %  ?  Body mass index is 24 01 kg/m²  Vitals:    05/30/23 1129   Weight: 80 3 kg (177 lb)     BP Readings from Last 3 Encounters:   05/30/23 128/70   10/06/22 140/82   06/10/22 123/79         Physical Exam  Constitutional:       General: He is not in acute distress  Appearance: He is well-developed  He is not diaphoretic  Neck:      Thyroid: No thyromegaly  Vascular: No JVD  Trachea: No tracheal deviation  Cardiovascular:      Rate and Rhythm: Normal rate and regular rhythm  Heart sounds: S1 normal and S2 normal  Heart sounds not distant  Murmur heard  Systolic (ejection) murmur is present with a grade of 2/6  No friction rub  No gallop  No S3 or S4 sounds  Pulmonary:      Effort: Pulmonary effort is normal  No respiratory distress  Breath sounds: Normal breath sounds  No wheezing or rales  Chest:      Chest wall: No tenderness  Abdominal:      General: Bowel sounds are normal  There is no distension  Palpations: Abdomen is soft  Tenderness: There is no abdominal tenderness  Musculoskeletal:         General: No deformity  Cervical back: Neck supple  Skin:     General: Skin is warm and dry  Coloration: Skin is not pale  Findings: No rash  Neurological:      Mental Status: He is alert and oriented to person, place, and time  Psychiatric:         Behavior: Behavior normal          Judgment: Judgment normal        Diagnostic Studies Review Cardio:  Stress Test: Stress test in 2013 shows maximum elective exercise stress test patient achieved 86% of his bradycardic heart rate  Repeat exercise stress test done 05/02/2018 was normal   Patient exercised for 10 minutes  Patient has exercise stress test done on 5/16/2022  He did 7 minutes of exercise and achieved a workload of 10 metabolic equivalents  However he continued continue the stress test due to his leg pain  Otherwise he has no issues  Echocardiogram/YOANA: Echo Doppler in 2013 shows normal LV systolic function grade 1 diastolic dysfunction and trace AI mild MR echo Doppler in January 2016 shows EF 55-60%, mild concentric left hypertrophy, mild AI, mild TR PA pressure 35 mm of mercury  Vascular imaging: Carotid Doppler done in 2014 shows no significant carotid artery stenosis bilaterally  ECG Report: EKG shows normal sinus rhythm heart rate 73 bpm this was done in April 2016  RSR pattern, probably normal [id number]  Twelve lead EKG shows normal sinus rhythm heart rate 64 beats per minute incomplete RBBB  Twelve lead EKG in our office shows normal sinus rhythm heart rate 68 beats per minute  RSR pattern  No change from old EKG  Twelve lead EKG 10/29/2019 shows normal sinus rhythm heart rate 71 beats per minute  No significant ST changes no change from old EKG       Twelve lead EKG 10/01/2020 shows normal sinus rhythm heart rate 67 beats per minute no significant ST changes no change from old EKG  Twelve lead EKG 12/15/2020 shows normal sinus rhythm left axis deviation  Q-wave in inferior lead probably due to micro are no change from old EKG  Twelve lead EKG 2021 shows normal sinus rhythm heart rate 70 beats per minute  Twelve lead EKG 10/22/2021 shows normal sinus rhythm heart rate 73 beats per minute small Q in inferior lead most likely pseudo infarct pattern no change from previous EKG  Lead EKG 2022 shows normal sinus rhythm heart rate 69 beats per minute  Left axis  Q-wave noted in inferior lead most likely with pseudo infarct pattern  No significant change from previous EKG  Twelve-lead EKG 2023 shows sinus rhythm heart rate 58 bpm Q waves noted inferior lead with small R  No change from previous EKG most likely pseudo infarct pattern  Cardiac testing:   Results for orders placed during the hospital encounter of 17   Echo complete with contrast if indicated    [address]  [address] [facility]   [phone]    Transthoracic Echocardiogram  2D, M-mode, Doppler, and Color Doppler    Study date:  2017    Patient: Cash Ruiz  MR number: [id number]  Account number: [de-identified]  : 1945  Age: 73 years  Gender: Male  Status: Routine  Location: Echo lab  Height: 72 in  Weight: 184 6 lb  BP: 118/ 64 mmHg    Indications: Murmur    Diagnoses: 785 2 - CARDIAC MURMURS NEC    Sonographer:  Sai Rosado  Primary Physician:  Adina Maurer DO  Referring Physician:  Claudio Hall MD  Group:  [facility]  Interpreting Physician:  Doris Brizuela DO    SUMMARY    LEFT VENTRICLE:  Systolic function was normal by visual assessment  Ejection fraction was estimated in the range of 55 % to 60 %  There were no regional wall motion abnormalities    There was mild concentric hypertrophy  Doppler parameters were consistent with abnormal left ventricular relaxation (grade 1 diastolic dysfunction)  MITRAL VALVE:  There was trace regurgitation  AORTIC VALVE:  There was no evidence for stenosis  There was mild to moderate regurgitation  TRICUSPID VALVE:  There was mild to moderate regurgitation  Pulmonary artery systolic pressure was within the normal range  HISTORY: PRIOR HISTORY: HTN, Hyperlipidemia, Pancreatic Cancer    PROCEDURE: The procedure was performed in the echo lab  This was a routine study  The transthoracic approach was used  The study included complete 2D imaging, M-mode, complete spectral Doppler, and color Doppler  The heart rate was 65 bpm,  at the start of the study  Image quality was adequate  LEFT VENTRICLE: Size was normal  Systolic function was normal by visual assessment  Ejection fraction was estimated in the range of 55 % to 60 %  There were no regional wall motion abnormalities  There was mild concentric hypertrophy  DOPPLER: Doppler parameters were consistent with abnormal left ventricular relaxation (grade 1 diastolic dysfunction)  RIGHT VENTRICLE: The size was normal  Systolic function was normal  DOPPLER: Systolic pressure was within the normal range  LEFT ATRIUM: The atrium was mildly dilated  RIGHT ATRIUM: Size was normal     MITRAL VALVE: Valve structure was normal  There was normal leaflet separation  No echocardiographic evidence for prolapse  DOPPLER: The transmitral velocity was within the normal range  There was no evidence for stenosis  There was trace  regurgitation  AORTIC VALVE: The valve was trileaflet  Leaflets exhibited normal cuspal separation and sclerosis  DOPPLER: Transaortic velocity was within the normal range  There was no evidence for stenosis  There was mild to moderate regurgitation  TRICUSPID VALVE: The valve structure was normal  There was normal leaflet separation   DOPPLER: The transtricuspid "velocity was within the normal range  There was mild to moderate regurgitation  Pulmonary artery systolic pressure was within  the normal range  Estimated peak PA pressure was 36 mmHg  PULMONIC VALVE: Leaflets exhibited normal thickness, no calcification, and normal cuspal separation  DOPPLER: The transpulmonic velocity was within the normal range  There was no regurgitation  PERICARDIUM: There was no thickening  There was no pericardial effusion  AORTA: The root exhibited normal size  PULMONARY ARTERY: The size was normal  The morphology appeared normal     SYSTEM MEASUREMENT TABLES    2D mode  AoR Diam 2D: 3 5 cm  LA Diam (2D): 4 5 cm  LA/Ao (2D): 1 29  FS (2D Teich): 38 4 %  IVSd (2D): 1 04 cm  LVDEV: 84 cm³  LVESV: 26 cm³  LVIDd(2D): 4 32 cm  LVISd (2D): 2 66 cm  LVPWd (2D): 1 12 cm  SV (Teich): 58 cm³    Apical four chamber  LVEF A4C: 55 %    Apical two chamber  LA Area: 20 7 cm squared  LA Volume: 59 cm³    Unspecified Scan Mode  MV Peak A Cy: 970 mm/s  MV Peak E Cy  Mean: 831 mm/s  MVA (PHT): 3 44 cm squared  PHT: 64 ms  Max P mm[Hg]  V Max: 2470 mm/s  Vmax: 2560 mm/s  RA Area: 16 7 cm squared  RA Volume: 42 1 cm³  TAPSE: 2 7 cm    [employer] Accredited Echocardiography Laboratory    Prepared and electronically signed by    Gayathri Gonzales DO  Signed 2017 11:38:55       Lab Review   Labs from 2019 reviewed    Dr Cori Sepulveda MD [facility]      \"This note has been constructed using a voice recognition system  Therefore there may be syntax, spelling, and/or grammatical errors  Please call if you have any questions   \"  "

## 2023-06-17 DIAGNOSIS — I10 ESSENTIAL HYPERTENSION: ICD-10-CM

## 2023-06-19 RX ORDER — SPIRONOLACTONE AND HYDROCHLOROTHIAZIDE 25; 25 MG/1; MG/1
TABLET ORAL
Qty: 45 TABLET | Refills: 3 | Status: SHIPPED | OUTPATIENT
Start: 2023-06-19

## 2023-06-19 RX ORDER — CARVEDILOL 6.25 MG/1
TABLET ORAL
Qty: 270 TABLET | Refills: 3 | Status: SHIPPED | OUTPATIENT
Start: 2023-06-19

## 2023-08-21 ENCOUNTER — TELEPHONE (OUTPATIENT)
Dept: CARDIOLOGY CLINIC | Facility: CLINIC | Age: 78
End: 2023-08-21

## 2023-08-21 NOTE — TELEPHONE ENCOUNTER
Pt called states his hr has been spiking. Notified by his smart watch. They spiked 3x in the past week, one of them being last night which woke him up. States it goes up to 180bpm for a couple of seconds then goes back down. States he feels fine, no shortness of breath or chest pain. States they also found a clot in his lower calf. They put him on eliquis.  pls advise

## 2023-08-22 ENCOUNTER — TELEPHONE (OUTPATIENT)
Dept: CARDIOLOGY CLINIC | Facility: CLINIC | Age: 78
End: 2023-08-22

## 2023-08-22 DIAGNOSIS — R00.2 PALPITATION: Primary | ICD-10-CM

## 2023-08-22 NOTE — PROGRESS NOTES
Patient is having palpitations of unclear etiology. Will be scheduled for Zio patch. Palpitation does not happen every day. He woke up couple times with that. Need to rule out underlying occult atrial arrhythmias.

## 2023-08-29 ENCOUNTER — HOSPITAL ENCOUNTER (EMERGENCY)
Facility: HOSPITAL | Age: 78
Discharge: HOME | End: 2023-08-29
Attending: EMERGENCY MEDICINE | Admitting: EMERGENCY MEDICINE
Payer: COMMERCIAL

## 2023-08-29 VITALS
HEIGHT: 72 IN | WEIGHT: 177 LBS | BODY MASS INDEX: 23.98 KG/M2 | RESPIRATION RATE: 18 BRPM | SYSTOLIC BLOOD PRESSURE: 176 MMHG | DIASTOLIC BLOOD PRESSURE: 87 MMHG | OXYGEN SATURATION: 96 % | HEART RATE: 74 BPM | TEMPERATURE: 97.2 F

## 2023-08-29 DIAGNOSIS — I82.409 DVT (DEEP VENOUS THROMBOSIS) (HCC): Primary | ICD-10-CM

## 2023-08-29 DIAGNOSIS — M79.605 LEFT LEG PAIN: ICD-10-CM

## 2023-08-29 LAB
ALBUMIN SERPL BCP-MCNC: 4.3 G/DL (ref 3.5–5)
ALP SERPL-CCNC: 74 U/L (ref 34–104)
ALT SERPL W P-5'-P-CCNC: 15 U/L (ref 7–52)
ANION GAP SERPL CALCULATED.3IONS-SCNC: 7 MMOL/L
APTT PPP: 31 SECONDS (ref 23–37)
AST SERPL W P-5'-P-CCNC: 15 U/L (ref 13–39)
BASOPHILS # BLD AUTO: 0.07 THOUSANDS/ÂΜL (ref 0–0.1)
BASOPHILS NFR BLD AUTO: 1 % (ref 0–1)
BILIRUB SERPL-MCNC: 0.67 MG/DL (ref 0.2–1)
BUN SERPL-MCNC: 16 MG/DL (ref 5–25)
CALCIUM SERPL-MCNC: 9.4 MG/DL (ref 8.4–10.2)
CHLORIDE SERPL-SCNC: 101 MMOL/L (ref 96–108)
CO2 SERPL-SCNC: 27 MMOL/L (ref 21–32)
CREAT SERPL-MCNC: 0.75 MG/DL (ref 0.6–1.3)
EOSINOPHIL # BLD AUTO: 0.19 THOUSAND/ÂΜL (ref 0–0.61)
EOSINOPHIL NFR BLD AUTO: 3 % (ref 0–6)
ERYTHROCYTE [DISTWIDTH] IN BLOOD BY AUTOMATED COUNT: 12.9 % (ref 11.6–15.1)
GFR SERPL CREATININE-BSD FRML MDRD: 89 ML/MIN/1.73SQ M
GLUCOSE SERPL-MCNC: 112 MG/DL (ref 65–140)
HCT VFR BLD AUTO: 42.9 % (ref 36.5–49.3)
HGB BLD-MCNC: 15.1 G/DL (ref 12–17)
IMM GRANULOCYTES # BLD AUTO: 0.01 THOUSAND/UL (ref 0–0.2)
IMM GRANULOCYTES NFR BLD AUTO: 0 % (ref 0–2)
INR PPP: 1 (ref 0.84–1.19)
LYMPHOCYTES # BLD AUTO: 1.31 THOUSANDS/ÂΜL (ref 0.6–4.47)
LYMPHOCYTES NFR BLD AUTO: 22 % (ref 14–44)
MCH RBC QN AUTO: 32.1 PG (ref 26.8–34.3)
MCHC RBC AUTO-ENTMCNC: 35.2 G/DL (ref 31.4–37.4)
MCV RBC AUTO: 91 FL (ref 82–98)
MONOCYTES # BLD AUTO: 1.04 THOUSAND/ÂΜL (ref 0.17–1.22)
MONOCYTES NFR BLD AUTO: 18 % (ref 4–12)
NEUTROPHILS # BLD AUTO: 3.27 THOUSANDS/ÂΜL (ref 1.85–7.62)
NEUTS SEG NFR BLD AUTO: 56 % (ref 43–75)
NRBC BLD AUTO-RTO: 0 /100 WBCS
PLATELET # BLD AUTO: 283 THOUSANDS/UL (ref 149–390)
PMV BLD AUTO: 9.1 FL (ref 8.9–12.7)
POTASSIUM SERPL-SCNC: 3.9 MMOL/L (ref 3.5–5.3)
PROT SERPL-MCNC: 7.4 G/DL (ref 6.4–8.4)
PROTHROMBIN TIME: 13.3 SECONDS (ref 11.6–14.5)
RBC # BLD AUTO: 4.71 MILLION/UL (ref 3.88–5.62)
SODIUM SERPL-SCNC: 135 MMOL/L (ref 135–147)
WBC # BLD AUTO: 5.89 THOUSAND/UL (ref 4.31–10.16)

## 2023-08-29 PROCEDURE — 36415 COLL VENOUS BLD VENIPUNCTURE: CPT | Performed by: EMERGENCY MEDICINE

## 2023-08-29 PROCEDURE — 99283 EMERGENCY DEPT VISIT LOW MDM: CPT

## 2023-08-29 PROCEDURE — 85730 THROMBOPLASTIN TIME PARTIAL: CPT | Performed by: EMERGENCY MEDICINE

## 2023-08-29 PROCEDURE — 85025 COMPLETE CBC W/AUTO DIFF WBC: CPT | Performed by: EMERGENCY MEDICINE

## 2023-08-29 PROCEDURE — 80053 COMPREHEN METABOLIC PANEL: CPT | Performed by: EMERGENCY MEDICINE

## 2023-08-29 PROCEDURE — 85610 PROTHROMBIN TIME: CPT | Performed by: EMERGENCY MEDICINE

## 2023-08-29 PROCEDURE — 99285 EMERGENCY DEPT VISIT HI MDM: CPT | Performed by: EMERGENCY MEDICINE

## 2023-08-29 PROCEDURE — 96374 THER/PROPH/DIAG INJ IV PUSH: CPT

## 2023-08-29 RX ORDER — MORPHINE SULFATE 4 MG/ML
4 INJECTION, SOLUTION INTRAMUSCULAR; INTRAVENOUS ONCE
Status: COMPLETED | OUTPATIENT
Start: 2023-08-29 | End: 2023-08-29

## 2023-08-29 RX ORDER — HYDROCODONE BITARTRATE AND ACETAMINOPHEN 5; 325 MG/1; MG/1
1 TABLET ORAL EVERY 6 HOURS PRN
Qty: 12 TABLET | Refills: 0 | Status: SHIPPED | OUTPATIENT
Start: 2023-08-29 | End: 2023-08-29 | Stop reason: SDUPTHER

## 2023-08-29 RX ORDER — HYDROCODONE BITARTRATE AND ACETAMINOPHEN 5; 325 MG/1; MG/1
1 TABLET ORAL EVERY 6 HOURS PRN
Qty: 12 TABLET | Refills: 0 | Status: SHIPPED | OUTPATIENT
Start: 2023-08-29 | End: 2023-09-01

## 2023-08-29 RX ADMIN — MORPHINE SULFATE 4 MG: 4 INJECTION INTRAVENOUS at 18:04

## 2023-08-29 NOTE — DISCHARGE INSTRUCTIONS
Rest elevate the leg  Take the blood thinners are prescribed  If you have worsening pain in the calf very tight color changes to blue please return to the ED immediately  Also if you have worsening chest pain,shortness of breath return to the ED.  Do not drive or operate heavy machinery while on the vicodin makes you drowsy do not take additional tylenol

## 2023-08-29 NOTE — ED PROVIDER NOTES
History  Chief Complaint   Patient presents with   • Leg Pain     Left leg DVT on eliquis for 10 days and states pain is worse, also smart watch showed heart rate of 180's in middle of night     68-year-old male presents with left lower leg DVT diagnosed 10 days ago on Eliquis has been compliant has been having leg pain that is not improving. Denies any shortness of breath chest pain nausea vomiting diarrhea fevers chills or any other symptoms. History provided by:  Patient   used: No        Prior to Admission Medications   Prescriptions Last Dose Informant Patient Reported? Taking? Ascorbic Acid (vitamin C) 1000 MG tablet  Self Yes No   Sig: Take 1,000 mg by mouth daily   alfuzosin (UROXATRAL) 10 mg 24 hr tablet  Self Yes No   Sig: Take 10 mg by mouth daily.    amLODIPine (NORVASC) 10 mg tablet  Self No No   Sig: TAKE 1 TABLET BY MOUTH  DAILY   aspirin 81 mg chewable tablet  Self Yes No   Sig: Chew 81 mg daily     carvedilol (COREG) 6.25 mg tablet   No No   Sig: TAKE 1 AND 1/2 TABLETS BY  MOUTH TWICE DAILY   cholecalciferol (VITAMIN D3) 1,000 units tablet  Self Yes No   Sig: Take 5,000 Units by mouth daily   rosuvastatin (CRESTOR) 5 mg tablet  Self No No   Sig: TAKE 1 TABLET BY MOUTH  DAILY   spironolactone-hydrochlorothiazide (ALDACTAZIDE) 25-25 mg per tablet   No No   Sig: TAKE ONE-HALF TABLET BY  MOUTH DAILY      Facility-Administered Medications: None       Past Medical History:   Diagnosis Date   • BEP (benign enlargement of prostate)    • Cancer (HCC)     pancreatic   whipple procedure  basal cell   • Cardiac murmur    • Colon polyp    • Hyperlipidemia    • Hypertension    • Positive colorectal cancer screening using Cologuard test 01/2021   • PVC (premature ventricular contraction) 1990's    hx of, most recent stress test 2018 - normal results per pt   • Right bundle branch block        Past Surgical History:   Procedure Laterality Date   • APPENDECTOMY     • COLONOSCOPY     • HERNIA REPAIR      right inguinal x2 and left inguinal x1   • PANCREAS SURGERY     • SPLENECTOMY, TOTAL     • WHIPPLE PROCEDURE/PANCREATICO-DUODENECTOMY         Family History   Problem Relation Age of Onset   • Hypertension Mother    • Hypertension Father    • Cancer Father    • Cancer Daughter      I have reviewed and agree with the history as documented. E-Cigarette/Vaping   • E-Cigarette Use Never User      E-Cigarette/Vaping Substances   • Nicotine No    • THC No    • CBD No    • Flavoring No    • Other No    • Unknown No      Social History     Tobacco Use   • Smoking status: Never   • Smokeless tobacco: Never   Vaping Use   • Vaping Use: Never used   Substance Use Topics   • Alcohol use: Yes     Alcohol/week: 10.0 standard drinks of alcohol     Types: 10 Cans of beer per week     Comment: 5-6 daily   • Drug use: No       Review of Systems   Constitutional: Negative. HENT: Negative. Eyes: Negative. Respiratory: Negative. Cardiovascular: Positive for leg swelling. Gastrointestinal: Negative. Endocrine: Negative. Genitourinary: Negative. Musculoskeletal: Positive for arthralgias and myalgias. Skin: Negative. Allergic/Immunologic: Negative. Neurological: Negative. Hematological: Negative. Psychiatric/Behavioral: Negative. All other systems reviewed and are negative. Physical Exam  Physical Exam  Vitals and nursing note reviewed. Constitutional:       Appearance: Normal appearance. HENT:      Head: Normocephalic and atraumatic. Nose: Nose normal.      Mouth/Throat:      Mouth: Mucous membranes are moist.   Eyes:      Extraocular Movements: Extraocular movements intact. Pupils: Pupils are equal, round, and reactive to light. Cardiovascular:      Rate and Rhythm: Normal rate and regular rhythm. Pulmonary:      Effort: Pulmonary effort is normal.      Breath sounds: Normal breath sounds. Abdominal:      General: Abdomen is flat.  Bowel sounds are normal.      Palpations: Abdomen is soft. Musculoskeletal:         General: Swelling and tenderness present. Normal range of motion. Cervical back: Normal range of motion and neck supple. Skin:     General: Skin is warm. Capillary Refill: Capillary refill takes less than 2 seconds. Neurological:      General: No focal deficit present. Mental Status: He is alert and oriented to person, place, and time. Mental status is at baseline. Psychiatric:         Mood and Affect: Mood normal.         Thought Content:  Thought content normal.         Vital Signs  ED Triage Vitals [08/29/23 1733]   Temperature Pulse Respirations Blood Pressure SpO2   (!) 97.2 °F (36.2 °C) 74 18 (!) 176/87 96 %      Temp src Heart Rate Source Patient Position - Orthostatic VS BP Location FiO2 (%)   -- -- -- -- --      Pain Score       2           Vitals:    08/29/23 1733   BP: (!) 176/87   Pulse: 74         Visual Acuity      ED Medications  Medications   morphine injection 4 mg (4 mg Intravenous Given 8/29/23 1804)       Diagnostic Studies  Results Reviewed     Procedure Component Value Units Date/Time    Comprehensive metabolic panel [[id number]] Collected: 08/29/23 1801    Lab Status: Final result Specimen: Blood from Arm, Left Updated: 08/29/23 1822     Sodium 135 mmol/L      Potassium 3.9 mmol/L      Chloride 101 mmol/L      CO2 27 mmol/L      ANION GAP 7 mmol/L      BUN 16 mg/dL      Creatinine 0.75 mg/dL      Glucose 112 mg/dL      Calcium 9.4 mg/dL      AST 15 U/L      ALT 15 U/L      Alkaline Phosphatase 74 U/L      Total Protein 7.4 g/dL      Albumin 4.3 g/dL      Total Bilirubin 0.67 mg/dL      eGFR 89 ml/min/1.73sq m     Narrative:      [place] guidelines for Chronic Kidney Disease (CKD):   •  Stage 1 with normal or high GFR (GFR > 90 mL/min/1.73 square meters)  •  Stage 2 Mild CKD (GFR = 60-89 mL/min/1.73 square meters)  •  Stage 3A Moderate CKD (GFR = 45-59 mL/min/1.73 square meters)  • Stage 3B Moderate CKD (GFR = 30-44 mL/min/1.73 square meters)  •  Stage 4 Severe CKD (GFR = 15-29 mL/min/1.73 square meters)  •  Stage 5 End Stage CKD (GFR <15 mL/min/1.73 square meters)  Note: GFR calculation is accurate only with a steady state creatinine    Protime-INR [[id number]]  (Normal) Collected: 08/29/23 1801    Lab Status: Final result Specimen: Blood from Arm, Left Updated: 08/29/23 1817     Protime 13.3 seconds      INR 1.00    APTT [[id number]]  (Normal) Collected: 08/29/23 1801    Lab Status: Final result Specimen: Blood from Arm, Left Updated: 08/29/23 1817     PTT 31 seconds     CBC and differential [[id number]]  (Abnormal) Collected: 08/29/23 1801    Lab Status: Final result Specimen: Blood from Arm, Left Updated: 08/29/23 1806     WBC 5.89 Thousand/uL      RBC 4.71 Million/uL      Hemoglobin 15.1 g/dL      Hematocrit 42.9 %      MCV 91 fL      MCH 32.1 pg      MCHC 35.2 g/dL      RDW 12.9 %      MPV 9.1 fL      Platelets 027 Thousands/uL      nRBC 0 /100 WBCs      Neutrophils Relative 56 %      Immat GRANS % 0 %      Lymphocytes Relative 22 %      Monocytes Relative 18 %      Eosinophils Relative 3 %      Basophils Relative 1 %      Neutrophils Absolute 3.27 Thousands/µL      Immature Grans Absolute 0.01 Thousand/uL      Lymphocytes Absolute 1.31 Thousands/µL      Monocytes Absolute 1.04 Thousand/µL      Eosinophils Absolute 0.19 Thousand/µL      Basophils Absolute 0.07 Thousands/µL                  No orders to display              Procedures  Procedures         ED Course                               SBIRT 22yo+    Flowsheet Row Most Recent Value   Initial Alcohol Screen: US AUDIT-C     1. How often do you have a drink containing alcohol? 0 Filed at: 08/29/2023 1732   2. How many drinks containing alcohol do you have on a typical day you are drinking? 0 Filed at: 08/29/2023 1732   3a. Male UNDER 65: How often do you have five or more drinks on one occasion? 0 Filed at: 08/29/2023 1732   3b. FEMALE Any Age, or MALE 65+: How often do you have 4 or more drinks on one occassion? 0 Filed at: 08/29/2023 1732   Audit-C Score 0 Filed at: 08/29/2023 1732   RUTHIE: How many times in the past year have you. .. Used an illegal drug or used a prescription medication for non-medical reasons? Never Filed at: 08/29/2023 1732                    Medical Decision Making  Patient discharged with appropriate instructions, medications and follow up. Patient verbalized understanding of instructions, had no further questions at the time of discharge. Patient had stable vital signs, and well appearing at discharge. Patient evaluated with labs he did not have any evidence of compartment syndrome pulses normal no color change she did have edema in the left lower leg with tenderness consistent with a DVT. He met no criteria to obtain a CTPE Study. Patient also had a DVT in his peroneal vein. DVT (deep venous thrombosis) ([address]): acute illness or injury  Left leg pain: acute illness or injury  Amount and/or Complexity of Data Reviewed  External Data Reviewed: notes. Labs: ordered. Decision-making details documented in ED Course. Risk  Prescription drug management. Disposition  Final diagnoses:   DVT (deep venous thrombosis) ([facility])   Left leg pain     Time reflects when diagnosis was documented in both MDM as applicable and the Disposition within this note     Time User Action Codes Description Comment    8/29/2023  6:30 PM Lluvia Woods Add [I82.409] DVT (deep venous thrombosis) ([address])     8/29/2023  6:30 PM Lluvia Woods Add [M79.605] Left leg pain       ED Disposition     ED Disposition   Discharge    Condition   Stable    Date/Time   Tue Aug 29, 2023  6:30 PM    Comment   Laurie Valencia discharge to home/self care.                Follow-up Information    None         Discharge Medication List as of 8/29/2023  6:34 PM      CONTINUE these medications which have NOT CHANGED    Details   alfuzosin (Lisbet Nguyen) 10 mg 24 hr tablet Take 10 mg by mouth daily. , Historical Med      amLODIPine (NORVASC) 10 mg tablet TAKE 1 TABLET BY MOUTH  DAILY, Normal      Ascorbic Acid (vitamin C) 1000 MG tablet Take 1,000 mg by mouth daily, Historical Med      aspirin 81 mg chewable tablet Chew 81 mg daily  , Historical Med      carvedilol (COREG) 6.25 mg tablet TAKE 1 AND 1/2 TABLETS BY  MOUTH TWICE DAILY, Normal      cholecalciferol (VITAMIN D3) 1,000 units tablet Take 5,000 Units by mouth daily, Historical Med      rosuvastatin (CRESTOR) 5 mg tablet TAKE 1 TABLET BY MOUTH  DAILY, Normal      spironolactone-hydrochlorothiazide (ALDACTAZIDE) 25-25 mg per tablet TAKE ONE-HALF TABLET BY  MOUTH DAILY, Normal             No discharge procedures on file.     PDMP Review     None          ED Provider  Electronically Signed by           Karyle Fort, DO  08/30/23 0106

## 2023-09-12 ENCOUNTER — OFFICE VISIT (OUTPATIENT)
Dept: FAMILY MEDICINE CLINIC | Facility: CLINIC | Age: 78
End: 2023-09-12
Payer: COMMERCIAL

## 2023-09-12 VITALS
HEIGHT: 72 IN | DIASTOLIC BLOOD PRESSURE: 72 MMHG | WEIGHT: 175.2 LBS | HEART RATE: 68 BPM | RESPIRATION RATE: 16 BRPM | BODY MASS INDEX: 23.73 KG/M2 | TEMPERATURE: 98 F | SYSTOLIC BLOOD PRESSURE: 118 MMHG

## 2023-09-12 DIAGNOSIS — Z00.00 MEDICARE ANNUAL WELLNESS VISIT, SUBSEQUENT: Primary | ICD-10-CM

## 2023-09-12 DIAGNOSIS — N13.8 BENIGN PROSTATIC HYPERPLASIA WITH URINARY OBSTRUCTION: ICD-10-CM

## 2023-09-12 DIAGNOSIS — I10 ESSENTIAL HYPERTENSION: ICD-10-CM

## 2023-09-12 DIAGNOSIS — I82.4Y2 ACUTE DEEP VEIN THROMBOSIS (DVT) OF PROXIMAL VEIN OF LEFT LOWER EXTREMITY (HCC): ICD-10-CM

## 2023-09-12 DIAGNOSIS — H90.3 SENSORINEURAL HEARING LOSS (SNHL), BILATERAL: ICD-10-CM

## 2023-09-12 DIAGNOSIS — E78.00 HYPERCHOLESTEROLEMIA: ICD-10-CM

## 2023-09-12 DIAGNOSIS — C25.9 MALIGNANT NEOPLASM OF PANCREAS, UNSPECIFIED LOCATION OF MALIGNANCY (HCC): ICD-10-CM

## 2023-09-12 DIAGNOSIS — R19.5 POSITIVE COLORECTAL CANCER SCREENING USING COLOGUARD TEST: ICD-10-CM

## 2023-09-12 DIAGNOSIS — N40.1 BENIGN PROSTATIC HYPERPLASIA WITH URINARY OBSTRUCTION: ICD-10-CM

## 2023-09-12 DIAGNOSIS — Z90.81 STATUS POST SPLENECTOMY: ICD-10-CM

## 2023-09-12 DIAGNOSIS — L08.9 LOCAL INFECTION OF SKIN AND SUBCUTANEOUS TISSUE: ICD-10-CM

## 2023-09-12 PROBLEM — R07.9 CHEST PAIN: Status: RESOLVED | Noted: 2018-04-18 | Resolved: 2023-09-12

## 2023-09-12 PROBLEM — K64.9 HEMORRHOIDS: Status: RESOLVED | Noted: 2021-02-04 | Resolved: 2023-09-12

## 2023-09-12 PROBLEM — I82.402 LEFT LEG DVT (HCC): Status: ACTIVE | Noted: 2023-09-11

## 2023-09-12 PROBLEM — H61.23 BILATERAL IMPACTED CERUMEN: Status: RESOLVED | Noted: 2019-12-03 | Resolved: 2023-09-12

## 2023-09-12 PROCEDURE — G0439 PPPS, SUBSEQ VISIT: HCPCS | Performed by: STUDENT IN AN ORGANIZED HEALTH CARE EDUCATION/TRAINING PROGRAM

## 2023-09-12 PROCEDURE — 99214 OFFICE O/P EST MOD 30 MIN: CPT | Performed by: STUDENT IN AN ORGANIZED HEALTH CARE EDUCATION/TRAINING PROGRAM

## 2023-09-12 NOTE — PROGRESS NOTES
Assessment and Plan:     Problem List Items Addressed This Visit        Digestive    Malignant neoplasm of pancreas, unspecified location of malignancy [facility])       Cardiovascular and Mediastinum    Essential hypertension    Left leg DVT ([address])    Relevant Orders    Ambulatory Referral to Hematology / Oncology       Nervous and Auditory    Sensorineural hearing loss (SNHL), bilateral       Musculoskeletal and Integument    Local infection of skin and subcutaneous tissue       Genitourinary    Benign prostatic hyperplasia with urinary obstruction       Other    Hypercholesterolemia    Positive colorectal cancer screening using Cologuard test    Status post splenectomy   Other Visit Diagnoses     Medicare annual wellness visit, subsequent    -  Primary         New patient encounter, medications reviewed, medical history reviewed. Previous pancreatic mass, resection with splenectomy, following at [facility], has had immunizations, will offer Prevnar 20 next visit. Hypertension, continue amlodipine, Coreg, spironolactone-hydrochlorothiazide, blood pressure 118/72 in office today. Recent left leg DVT, currently on Eliquis, recommend follow-up with hematology/oncology for further evaluation of unprovoked DVT. Palpitations, following with cardiology, Coreg, Holter monitor reading pending    Follow-up in office after hematology, cardiology follow-ups    Positive Cologuard was followed up with colonoscopy, 3-year follow-up recommended. Depression Screening and Follow-up Plan: Patient was screened for depression during today's encounter. They screened negative with a PHQ-2 score of 0. Preventive health issues were discussed with patient, and age appropriate screening tests were ordered as noted in patient's After Visit Summary. Personalized health advice and appropriate referrals for health education or preventive services given if needed, as noted in patient's After Visit Summary.      History of Present Illness:     Patient presents for a Medicare Wellness Visit    HPI   Patient Care Team:  Carlitos Lindsey DO as PCP - General (Family Medicine)  Any Smith MD     Review of Systems:     Review of Systems   Constitutional: Negative for chills, fatigue and fever. HENT: Negative for congestion and sore throat. Eyes: Negative for pain and visual disturbance. Respiratory: Negative for shortness of breath and wheezing. Cardiovascular: Negative for chest pain and palpitations. Gastrointestinal: Negative for abdominal pain, constipation, diarrhea, nausea and vomiting. Genitourinary: Negative for dysuria and frequency. Musculoskeletal: Negative for back pain and neck pain. Skin: Negative for color change and rash. Neurological: Negative for dizziness and headaches. Psychiatric/Behavioral: Negative for agitation and confusion. All other systems reviewed and are negative.        Problem List:     Patient Active Problem List   Diagnosis   • Essential hypertension   • Cardiac murmur   • Hypercholesterolemia   • Benign prostatic hyperplasia with urinary obstruction   • Palpitation   • Sensorineural hearing loss (SNHL), bilateral   • Positive colorectal cancer screening using Cologuard test   • Pancreatic mass   • Incisional hernia   • Colon polyps   • Malignant neoplasm of pancreas, unspecified location of malignancy ([address])   • Left leg DVT ([address])   • Local infection of skin and subcutaneous tissue   • Status post splenectomy      Past Medical and Surgical History:     Past Medical History:   Diagnosis Date   • BEP (benign enlargement of prostate)    • Cancer ([address])     pancreatic   whipple procedure  basal cell   • Cardiac murmur    • Colon polyp    • Hyperlipidemia    • Hypertension    • Positive colorectal cancer screening using Cologuard test 01/2021   • PVC (premature ventricular contraction) 1990's    hx of, most recent stress test 2018 - normal results per pt   • Right bundle branch block      Past Surgical History:   Procedure Laterality Date   • APPENDECTOMY     • COLONOSCOPY     • HERNIA REPAIR      right inguinal x2 and left inguinal x1   • PANCREAS SURGERY     • SPLENECTOMY, TOTAL     • WHIPPLE PROCEDURE/PANCREATICO-DUODENECTOMY        Family History:     Family History   Problem Relation Age of Onset   • Hypertension Mother    • Hypertension Father    • Cancer Father    • Cancer Daughter       Social History:     Social History     Socioeconomic History   • Marital status: /Civil Union     Spouse name: None   • Number of children: None   • Years of education: None   • Highest education level: None   Occupational History   • None   Tobacco Use   • Smoking status: Never   • Smokeless tobacco: Never   Vaping Use   • Vaping Use: Never used   Substance and Sexual Activity   • Alcohol use: Yes     Alcohol/week: 10.0 standard drinks of alcohol     Types: 10 Cans of beer per week     Comment: 5-6 daily   • Drug use: No   • Sexual activity: None   Other Topics Concern   • None   Social History Narrative   • None     Social Determinants of Health     Financial Resource Strain: Low Risk  (9/12/2023)    Overall Financial Resource Strain (CARDIA)    • Difficulty of Paying Living Expenses: Not very hard   Food Insecurity: Not on file   Transportation Needs: No Transportation Needs (9/12/2023)    PRAPARE - Transportation    • Lack of Transportation (Medical): No    • Lack of Transportation (Non-Medical): No   Physical Activity: Not on file   Stress: Not on file   Social Connections: Not on file   Intimate Partner Violence: Not on file   Housing Stability: Not on file      Medications and Allergies:     Current Outpatient Medications   Medication Sig Dispense Refill   • alfuzosin (UROXATRAL) 10 mg 24 hr tablet Take 10 mg by mouth daily.      • amLODIPine (NORVASC) 10 mg tablet TAKE 1 TABLET BY MOUTH  DAILY 90 tablet 3   • apixaban (ELIQUIS) 5 mg Take 5 mg by mouth 2 (two) times a day     • Ascorbic Acid (vitamin C) 1000 MG tablet Take 1,000 mg by mouth daily     • aspirin 81 mg chewable tablet Chew 81 mg daily       • carvedilol (COREG) 6.25 mg tablet TAKE 1 AND 1/2 TABLETS BY  MOUTH TWICE DAILY 270 tablet 3   • cholecalciferol (VITAMIN D3) 1,000 units tablet Take 5,000 Units by mouth daily     • rosuvastatin (CRESTOR) 5 mg tablet TAKE 1 TABLET BY MOUTH  DAILY 90 tablet 3   • spironolactone-hydrochlorothiazide (ALDACTAZIDE) 25-25 mg per tablet TAKE ONE-HALF TABLET BY  MOUTH DAILY 45 tablet 3     No current facility-administered medications for this visit. No Known Allergies   Immunizations:     Immunization History   Administered Date(s) Administered   • COVID-19 MODERNA VACC 0.5 ML IM 03/05/2021, 04/05/2021, 04/05/2021   • HiB 05/30/2016   • Influenza, seasonal, injectable 05/30/2016   • Meningococcal Conjugate (MCV4O) 05/30/2016   • Pneumococcal Polysaccharide PPV23 05/30/2016      Health Maintenance:         Topic Date Due   • Hepatitis C Screening  Never done   • Colorectal Cancer Screening  06/09/2025         Topic Date Due   • HIB Vaccine (1 of 1 - Risk 1-dose series) 12/19/1946   • Meningococcal ACWY Vaccine (2 - Risk 2-dose series) 07/25/2016   • Pneumococcal Vaccine: 65+ Years (2 - PCV) 05/30/2017   • COVID-19 Vaccine (4 - Moderna series) 05/31/2021   • Influenza Vaccine (1) 09/01/2023      Medicare Screening Tests and Risk Assessments:     Brad Leslie is here for his Subsequent Wellness visit. Last Medicare Wellness visit information reviewed, patient interviewed and updates made to the record today. Health Risk Assessment:   Patient rates overall health as very good. Patient feels that their physical health rating is same. Patient is very satisfied with their life. Eyesight was rated as same. Hearing was rated as same. Patient feels that their emotional and mental health rating is same. Patients states they are never, rarely angry.  Patient states they are never, rarely unusually tired/fatigued. Pain experienced in the last 7 days has been some. Patient's pain rating has been 2/10. Patient states that he has experienced no weight loss or gain in last 6 months. Ache in leg    Depression Screening:   PHQ-2 Score: 0      Fall Risk Screening: In the past year, patient has experienced: no history of falling in past year      Home Safety:  Patient does not have trouble with stairs inside or outside of their home. Patient has working smoke alarms and has working carbon monoxide detector. Home safety hazards include: none. Nutrition:   Current diet is Regular. Medications:   Patient is currently taking over-the-counter supplements. OTC medications include: see medication list. Patient is able to manage medications. Activities of Daily Living (ADLs)/Instrumental Activities of Daily Living (IADLs):   Walk and transfer into and out of bed and chair?: Yes  Dress and groom yourself?: Yes    Bathe or shower yourself?: Yes    Feed yourself? Yes  Do your laundry/housekeeping?: Yes  Manage your money, pay your bills and track your expenses?: Yes  Make your own meals?: Yes    Do your own shopping?: Yes    Previous Hospitalizations:   Any hospitalizations or ED visits within the last 12 months?: No      Advance Care Planning:   Living will: Yes    Durable POA for healthcare:  Yes    Advanced directive: Yes      Cognitive Screening:   Provider or family/friend/caregiver concerned regarding cognition?: No    PREVENTIVE SCREENINGS      Cardiovascular Screening:    General: History Lipid Disorder and Risks and Benefits Discussed    Due for: Lipid Panel      Diabetes Screening:     General: Screening Current and Risks and Benefits Discussed      Colorectal Cancer Screening:     General: Screening Current      Prostate Cancer Screening:    General: Screening Not Indicated      Osteoporosis Screening:    General: Screening Not Indicated      Abdominal Aortic Aneurysm (AAA) Screening:    Risk factors include: age between 70-75 yo        General: Screening Not Indicated      Lung Cancer Screening:     General: Screening Not Indicated      Hepatitis C Screening:    General: Risks and Benefits Discussed    Hep C Screening Accepted: Yes      Screening, Brief Intervention, and Referral to Treatment (SBIRT)    Screening  Typical number of drinks in a day: 0  Typical number of drinks in a week: 10  Interpretation: Low risk drinking behavior. AUDIT-C Screenin) How often did you have a drink containing alcohol in the past year? 2 to 3 times a week  2) How many drinks did you have on a typical day when you were drinking in the past year? 1 to 2  3) How often did you have 6 or more drinks on one occasion in the past year? never    AUDIT-C Score: 3  Interpretation: Score 0-3 (male): Negative screen for alcohol misuse    Single Item Drug Screening:  How often have you used an illegal drug (including marijuana) or a prescription medication for non-medical reasons in the past year? never    Single Item Drug Screen Score: 0  Interpretation: Negative screen for possible drug use disorder    Brief Intervention  Alcohol & drug use screenings were reviewed. No concerns regarding substance use disorder identified. Other Counseling Topics:   Car/seat belt/driving safety, skin self-exam, sunscreen and calcium and vitamin D intake and regular weightbearing exercise. No results found. Physical Exam:     /72 (BP Location: Left arm, Patient Position: Sitting, Cuff Size: Large)   Pulse 68   Temp 98 °F (36.7 °C)   Resp 16   Ht 6' (1.829 m)   Wt 79.5 kg (175 lb 3.2 oz)   BMI 23.76 kg/m²     Physical Exam  Vitals and nursing note reviewed. Constitutional:       General: He is not in acute distress. Appearance: He is well-developed. HENT:      Head: Normocephalic and atraumatic. Eyes:      General: No scleral icterus.      Conjunctiva/sclera: Conjunctivae normal.   Cardiovascular:      Rate and Rhythm: Normal rate and regular rhythm. Heart sounds: No murmur heard. Pulmonary:      Effort: Pulmonary effort is normal. No respiratory distress. Breath sounds: Normal breath sounds. Abdominal:      General: Bowel sounds are normal. There is no distension. Palpations: Abdomen is soft. Tenderness: There is no abdominal tenderness. Musculoskeletal:         General: No swelling. Cervical back: Neck supple. Skin:     General: Skin is warm and dry. Capillary Refill: Capillary refill takes less than 2 seconds. Neurological:      General: No focal deficit present. Mental Status: He is alert and oriented to person, place, and time. Mental status is at baseline.    Psychiatric:         Mood and Affect: Mood normal.         Behavior: Behavior normal.          Briseida Espinal DO

## 2023-09-12 NOTE — PATIENT INSTRUCTIONS
Medicare Preventive Visit Patient Instructions  Thank you for completing your Welcome to Medicare Visit or Medicare Annual Wellness Visit today. Your next wellness visit will be due in one year (9/12/2024). The screening/preventive services that you may require over the next 5-10 years are detailed below. Some tests may not apply to you based off risk factors and/or age. Screening tests ordered at today's visit but not completed yet may show as past due. Also, please note that scanned in results may not display below. Preventive Screenings:  Service Recommendations Previous Testing/Comments   Colorectal Cancer Screening  · Colonoscopy    · Fecal Occult Blood Test (FOBT)/Fecal Immunochemical Test (FIT)  · Fecal DNA/Cologuard Test  · Flexible Sigmoidoscopy Age: 43-73 years old   Colonoscopy: every 10 years (May be performed more frequently if at higher risk)  OR  FOBT/FIT: every 1 year  OR  Cologuard: every 3 years  OR  Sigmoidoscopy: every 5 years  Screening may be recommended earlier than age 39 if at higher risk for colorectal cancer. Also, an individualized decision between you and your healthcare provider will decide whether screening between the ages of 77-80 would be appropriate.  Colonoscopy: 06/10/2022  FOBT/FIT: Not on file  Cologuard: Not on file  Sigmoidoscopy: Not on file    Screening Current     Prostate Cancer Screening Individualized decision between patient and health care provider in men between ages of 53-66   Medicare will cover every 12 months beginning on the day after your 50th birthday PSA: No results in last 5 years     Screening Not Indicated     Hepatitis C Screening Once for adults born between [birth year] and 1965  More frequently in patients at high risk for Hepatitis C Hep C Antibody: Not on file        Diabetes Screening 1-2 times per year if you're at risk for diabetes or have pre-diabetes Fasting glucose: No results in last 5 years (No results in last 5 years)  A1C: No results in last 5 years (No results in last 5 years)  Screening Current   Cholesterol Screening Once every 5 years if you don't have a lipid disorder. May order more often based on risk factors. Lipid panel: 10/14/2022  Screening Not Indicated  History Lipid Disorder      Other Preventive Screenings Covered by Medicare:  1. Abdominal Aortic Aneurysm (AAA) Screening: covered once if your at risk. You're considered to be at risk if you have a family history of AAA or a male between the age of 70-76 who smoking at least 100 cigarettes in your lifetime. 2. Lung Cancer Screening: covers low dose CT scan once per year if you meet all of the following conditions: (1) Age 48-67; (2) No signs or symptoms of lung cancer; (3) Current smoker or have quit smoking within the last 15 years; (4) You have a tobacco smoking history of at least 20 pack years (packs per day x number of years you smoked); (5) You get a written order from a healthcare provider. 3. Glaucoma Screening: covered annually if you're considered high risk: (1) You have diabetes OR (2) Family history of glaucoma OR (3)  aged 48 and older OR (3)  American aged 72 and older  3. Osteoporosis Screening: covered every 2 years if you meet one of the following conditions: (1) Have a vertebral abnormality; (2) On glucocorticoid therapy for more than 3 months; (3) Have primary hyperparathyroidism; (4) On osteoporosis medications and need to assess response to drug therapy. 5. HIV Screening: covered annually if you're between the age of 14-79. Also covered annually if you are younger than 13 and older than 72 with risk factors for HIV infection. For pregnant patients, it is covered up to 3 times per pregnancy.     Immunizations:  Immunization Recommendations   Influenza Vaccine Annual influenza vaccination during flu season is recommended for all persons aged >= 6 months who do not have contraindications   Pneumococcal Vaccine   * Pneumococcal conjugate vaccine = PCV13 (Prevnar 13), PCV15 (Vaxneuvance), PCV20 (Prevnar 20)  * Pneumococcal polysaccharide vaccine = PPSV23 (Pneumovax) Adults 2364 years old: 1-3 doses may be recommended based on certain risk factors  Adults 72 years old: 1-2 doses may be recommended based off what pneumonia vaccine you previously received   Hepatitis B Vaccine 3 dose series if at intermediate or high risk (ex: diabetes, end stage renal disease, liver disease)   Tetanus (Td) Vaccine - COST NOT COVERED BY MEDICARE PART B Following completion of primary series, a booster dose should be given every 10 years to maintain immunity against tetanus. Td may also be given as tetanus wound prophylaxis. Tdap Vaccine - COST NOT COVERED BY MEDICARE PART B Recommended at least once for all adults. For pregnant patients, recommended with each pregnancy. Shingles Vaccine (Shingrix) - COST NOT COVERED BY MEDICARE PART B  2 shot series recommended in those aged 48 and above     Health Maintenance Due:      Topic Date Due   • Hepatitis C Screening  Never done   • Colorectal Cancer Screening  06/09/2025     Immunizations Due:      Topic Date Due   • HIB Vaccine (1 of 1 - Risk 1-dose series) Never done   • Meningococcal ACWY Vaccine (1 - Risk 2-dose series) Never done   • Pneumococcal Vaccine: 65+ Years (1 - PCV) Never done   • COVID-19 Vaccine (3 - Moderna series) 05/31/2021   • Influenza Vaccine (1) 09/01/2023     Advance Directives   What are advance directives? Advance directives are legal documents that state your wishes and plans for medical care. These plans are made ahead of time in case you lose your ability to make decisions for yourself. Advance directives can apply to any medical decision, such as the treatments you want, and if you want to donate organs. What are the types of advance directives? There are many types of advance directives, and each state has rules about how to use them.  You may choose a combination of any of the following:  · Living will: This is a written record of the treatment you want. You can also choose which treatments you do not want, which to limit, and which to stop at a certain time. This includes surgery, medicine, IV fluid, and tube feedings. · Durable power of  for healthcare [facility]): This is a written record that states who you want to make healthcare choices for you when you are unable to make them for yourself. This person, called a proxy, is usually a family member or a friend. You may choose more than 1 proxy. · Do not resuscitate (DNR) order:  A DNR order is used in case your heart stops beating or you stop breathing. It is a request not to have certain forms of treatment, such as CPR. A DNR order may be included in other types of advance directives. · Medical directive: This covers the care that you want if you are in a coma, near death, or unable to make decisions for yourself. You can list the treatments you want for each condition. Treatment may include pain medicine, surgery, blood transfusions, dialysis, IV or tube feedings, and a ventilator (breathing machine). · Values history: This document has questions about your views, beliefs, and how you feel and think about life. This information can help others choose the care that you would choose. Why are advance directives important? An advance directive helps you control your care. Although spoken wishes may be used, it is better to have your wishes written down. Spoken wishes can be misunderstood, or not followed. Treatments may be given even if you do not want them. An advance directive may make it easier for your family to make difficult choices about your care. © Copyright [employer] 2018 Information is for End User's use only and may not be sold, redistributed or otherwise used for commercial purposes.  All illustrations and images included in CareNotes® are the copyrighted property of A.D.A.M., Inc. or [employer] [facility]

## 2023-09-14 ENCOUNTER — TELEPHONE (OUTPATIENT)
Dept: HEMATOLOGY ONCOLOGY | Facility: CLINIC | Age: 78
End: 2023-09-14

## 2023-09-14 NOTE — TELEPHONE ENCOUNTER
I called Luis Antoniofloyd Lázaro in response to a referral that was received for patient to establish care with Hematology. Outreach was made to schedule a consultation. I left a voicemail explaining the reason for my call and advised patient to call [facility] at [phone]. Another attempt will be made to contact patient.

## 2023-09-15 ENCOUNTER — TELEPHONE (OUTPATIENT)
Dept: HEMATOLOGY ONCOLOGY | Facility: CLINIC | Age: 78
End: 2023-09-15

## 2023-09-15 NOTE — TELEPHONE ENCOUNTER
I called Treva Mullins in response to a referral that was received for patient to establish care with Hematology. Outreach was made to schedule a consultation. A consultation was scheduled for patient during this call.  Patient is scheduled on 10/11/23 at 9:20AM with Dr Thomas Barrow at the [facility]

## 2023-09-19 ENCOUNTER — TELEPHONE (OUTPATIENT)
Dept: CARDIOLOGY CLINIC | Facility: CLINIC | Age: 78
End: 2023-09-19

## 2023-09-19 ENCOUNTER — CLINICAL SUPPORT (OUTPATIENT)
Dept: CARDIOLOGY CLINIC | Facility: CLINIC | Age: 78
End: 2023-09-19
Payer: COMMERCIAL

## 2023-09-19 ENCOUNTER — 1 YEAR FOLLOW-UP (OUTPATIENT)
Dept: URBAN - METROPOLITAN AREA CLINIC 27 | Facility: CLINIC | Age: 78
End: 2023-09-19

## 2023-09-19 DIAGNOSIS — R00.2 PALPITATION: ICD-10-CM

## 2023-09-19 DIAGNOSIS — H43.813: ICD-10-CM

## 2023-09-19 DIAGNOSIS — H33.302: ICD-10-CM

## 2023-09-19 DIAGNOSIS — H25.9: ICD-10-CM

## 2023-09-19 PROCEDURE — 92014 COMPRE OPH EXAM EST PT 1/>: CPT

## 2023-09-19 PROCEDURE — 93248 EXT ECG>7D<15D REV&INTERPJ: CPT | Performed by: INTERNAL MEDICINE

## 2023-09-19 PROCEDURE — 92201 OPSCPY EXTND RTA DRAW UNI/BI: CPT

## 2023-09-19 ASSESSMENT — TONOMETRY
OS_IOP_MMHG: 13
OD_IOP_MMHG: 17

## 2023-09-19 ASSESSMENT — VISUAL ACUITY
OD_CC: 20/25-1
OS_CC: 20/30-1

## 2023-09-19 NOTE — TELEPHONE ENCOUNTER
----- Message from Eddie Erickson MD sent at 9/19/2023  4:01 PM EDT -----  SIGNATURE  Preliminary Findings Prepared by ALEA Garcia 09/15/23 Final Interpretation  Patient had a min HR of 49 bpm, max HR of 176 bpm, and avg HR of 68  bpm. Predominant underlying rhythm was Sinus Rhythm. 25  Supraventricular Tachycardia runs occurred, the run with the fastest  interval lasting 17 beats with a max rate of 176 bpm, the longest lasting  16 beats with an avg rate of 105 bpm. Isolated SVEs were rare (<1.0%),  SVE Couplets were rare (<1.0%), and SVE Triplets were rare (<1.0%). Isolated VEs were rare (<1.0%), and no VE Couplets or VE Triplets were  present. Patient monitoring shows 1 episodes of short atrial run of 17 beats with a heart rate 175 bpm.  Otherwise no significant tachycardia. Arrhythmia. Continue carvedilol as he is taking now. If he had more episodes we may need to increase the dose of carvedilol to higher dose.   Please advise him to let us know

## 2023-10-11 ENCOUNTER — OFFICE VISIT (OUTPATIENT)
Dept: HEMATOLOGY ONCOLOGY | Facility: CLINIC | Age: 78
End: 2023-10-11
Payer: COMMERCIAL

## 2023-10-11 VITALS
WEIGHT: 176 LBS | DIASTOLIC BLOOD PRESSURE: 74 MMHG | HEIGHT: 72 IN | HEART RATE: 66 BPM | OXYGEN SATURATION: 98 % | TEMPERATURE: 97.3 F | BODY MASS INDEX: 23.84 KG/M2 | RESPIRATION RATE: 18 BRPM | SYSTOLIC BLOOD PRESSURE: 126 MMHG

## 2023-10-11 DIAGNOSIS — I82.4Y2 ACUTE DEEP VEIN THROMBOSIS (DVT) OF PROXIMAL VEIN OF LEFT LOWER EXTREMITY (HCC): ICD-10-CM

## 2023-10-11 PROCEDURE — 99204 OFFICE O/P NEW MOD 45 MIN: CPT | Performed by: INTERNAL MEDICINE

## 2023-10-11 NOTE — PROGRESS NOTES
Yamini Cortez  1945  [address] [facility]  [address] [place] [id number]    Chief Complaint   Patient presents with    Consult     History of Present Illness:  Milly Crowell is a 21-year-old male with past medical history significant for hypercholesterolemia, HTN and history of pancreatic mass s/p Whipple procedure (benign). Patient initially presented to his PCP on 8/16/2023 with complaints of left lower extremity swelling, redness and throbbing sensation. He had previously been seen in the office on 7/18/2023 for left lower extremity swelling associated with some scratches secondary to splitting firewood. He had been prescribed cefadroxil for cellulitis. He noted improvement in his erythema and swelling after he finished antibiotics, but subsequently noted swelling, erythema and pain. He underwent venous duplex of lower extremity on 8/16/2023 (results not available to me in [facility] or under Care Everywhere). He was diagnosed with left lower extremity DVT and started on Eliquis. Patient presented to his PCP on 8/29/2023 with increasing pain and swelling in his left. At that time he had been on Eliquis for over a week and noted the pain and swelling is getting worse in his left calf and now his right leg was swollen. He was sent to the ER for further evaluation. Work-up was unremarkable. He presents for initial hematology evaluation accompanied by his wife for visit. He is currently taking eliquis twice daily since 8/2023. Denies any dark stools, blood in stools or hematuria. Denies any CP, SOB or palpitations. States as the day progresses he notices swelling in LLE. He denies any personal or family history of blood clots. He does not smoke. Denies recent travel. He is otherwise active. Denies recent illness or COVID infection.      3/31/22: S/p colonsocpy   History of colon polyps 4 small polyps removed today, diverticulosis, external hemorrhoids, family history colon cancer daughter in her 45s father in his 80s. Nonspecific erosions small on ileocecal valve of questionable significance  He was recommended repeat colonoscopy in 3 years due to history of colon polyps. Review of Systems   Constitutional:  Negative for chills and fever. Eyes:  Negative for pain and visual disturbance. Respiratory:  Negative for cough and shortness of breath. Cardiovascular:  Positive for leg swelling (Left lower extremity ankle/leg swelling as day progresses). Negative for chest pain and palpitations. Gastrointestinal:  Negative for abdominal pain and vomiting. Genitourinary:  Negative for dysuria and hematuria. Musculoskeletal:  Negative for arthralgias and back pain. Skin:  Negative for color change and rash. Neurological:  Negative for seizures and syncope. All other systems reviewed and are negative.       Patient Active Problem List   Diagnosis    Essential hypertension    Cardiac murmur    Hypercholesterolemia    Benign prostatic hyperplasia with urinary obstruction    Palpitation    Sensorineural hearing loss (SNHL), bilateral    Positive colorectal cancer screening using Cologuard test    Pancreatic mass    Incisional hernia    Colon polyps    Malignant neoplasm of pancreas, unspecified location of malignancy ([address])    Left leg DVT ([address])    Local infection of skin and subcutaneous tissue    Status post splenectomy     Past Medical History:   Diagnosis Date    BEP (benign enlargement of prostate)     Cancer (HCC)     pancreatic   whipple procedure  basal cell    Cardiac murmur     Colon polyp     Hyperlipidemia     Hypertension     Positive colorectal cancer screening using Cologuard test 01/2021    PVC (premature ventricular contraction) 1990's    hx of, most recent stress test 2018 - normal results per pt    Right bundle branch block      Past Surgical History:   Procedure Laterality Date    APPENDECTOMY COLONOSCOPY      HERNIA REPAIR      right inguinal x2 and left inguinal x1    PANCREAS SURGERY      SPLENECTOMY, TOTAL      WHIPPLE PROCEDURE/PANCREATICO-DUODENECTOMY       Family History   Problem Relation Age of Onset    Hypertension Mother     Hypertension Father     Cancer Father     Cancer Daughter      Social History     Socioeconomic History    Marital status: /Civil Union     Spouse name: Not on file    Number of children: Not on file    Years of education: Not on file    Highest education level: Not on file   Occupational History    Not on file   Tobacco Use    Smoking status: Never    Smokeless tobacco: Never   Vaping Use    Vaping Use: Never used   Substance and Sexual Activity    Alcohol use: Yes     Alcohol/week: 10.0 standard drinks of alcohol     Types: 10 Cans of beer per week     Comment: 5-6 daily    Drug use: No    Sexual activity: Not on file   Other Topics Concern    Not on file   Social History Narrative    Not on file     Social Determinants of Health     Financial Resource Strain: Low Risk  (9/12/2023)    Overall Financial Resource Strain (CARDIA)     Difficulty of Paying Living Expenses: Not very hard   Food Insecurity: Not on file   Transportation Needs: No Transportation Needs (9/12/2023)    PRAPARE - Transportation     Lack of Transportation (Medical): No     Lack of Transportation (Non-Medical): No   Physical Activity: Not on file   Stress: Not on file   Social Connections: Not on file   Intimate Partner Violence: Not on file   Housing Stability: Not on file       Current Outpatient Medications:     alfuzosin (UROXATRAL) 10 mg 24 hr tablet, Take 10 mg by mouth daily. , Disp: , Rfl:     amLODIPine (NORVASC) 10 mg tablet, TAKE 1 TABLET BY MOUTH  DAILY, Disp: 90 tablet, Rfl: 3    apixaban (ELIQUIS) 5 mg, Take 5 mg by mouth 2 (two) times a day, Disp: , Rfl:     Ascorbic Acid (vitamin C) 1000 MG tablet, Take 1,000 mg by mouth daily, Disp: , Rfl:     aspirin 81 mg chewable tablet, Chew 81 mg daily  , Disp: , Rfl:     carvedilol (COREG) 6.25 mg tablet, TAKE 1 AND 1/2 TABLETS BY  MOUTH TWICE DAILY, Disp: 270 tablet, Rfl: 3    cholecalciferol (VITAMIN D3) 1,000 units tablet, Take 5,000 Units by mouth daily, Disp: , Rfl:     rosuvastatin (CRESTOR) 5 mg tablet, TAKE 1 TABLET BY MOUTH  DAILY, Disp: 90 tablet, Rfl: 3    spironolactone-hydrochlorothiazide (ALDACTAZIDE) 25-25 mg per tablet, TAKE ONE-HALF TABLET BY  MOUTH DAILY, Disp: 45 tablet, Rfl: 3  No Known Allergies    Visit Vitals  /74   Pulse 66   Temp (!) 97.3 °F (36.3 °C)   Resp 18   Ht 6' (1.829 m)   Wt 79.8 kg (176 lb)   SpO2 98%   BMI 23.87 kg/m²   Smoking Status Never   BSA 2.02 m²     Wt Readings from Last 3 Encounters:   10/11/23 79.8 kg (176 lb)   09/12/23 79.5 kg (175 lb 3.2 oz)   08/29/23 80.3 kg (177 lb)     Physical Exam  Vitals reviewed. Constitutional:       General: He is not in acute distress. Appearance: Normal appearance. HENT:      Head: Normocephalic and atraumatic. Mouth/Throat:      Mouth: Mucous membranes are moist.   Eyes:      Extraocular Movements: Extraocular movements intact. Conjunctiva/sclera: Conjunctivae normal.   Cardiovascular:      Rate and Rhythm: Normal rate. Pulmonary:      Effort: Pulmonary effort is normal. No respiratory distress. Breath sounds: No wheezing, rhonchi or rales. Abdominal:      General: Abdomen is flat. There is no distension. Palpations: Abdomen is soft. Musculoskeletal:      Cervical back: Normal range of motion. Right lower leg: No edema. Left lower leg: Edema (Mild ankle edema) present. Skin:     General: Skin is warm. Coloration: Skin is not jaundiced. Neurological:      General: No focal deficit present. Mental Status: He is alert and oriented to person, place, and time. Mental status is at baseline. Gait: Gait normal.   Psychiatric:         Thought Content:  Thought content normal.       Labs:  No visits with results within 1 Month(s) from this visit.    Latest known visit with results is:   Admission on 08/29/2023, Discharged on 08/29/2023   Component Date Value Ref Range Status    WBC 08/29/2023 5.89  4.31 - 10.16 Thousand/uL Final    RBC 08/29/2023 4.71  3.88 - 5.62 Million/uL Final    Hemoglobin 08/29/2023 15.1  12.0 - 17.0 g/dL Final    Hematocrit 08/29/2023 42.9  36.5 - 49.3 % Final    MCV 08/29/2023 91  82 - 98 fL Final    MCH 08/29/2023 32.1  26.8 - 34.3 pg Final    MCHC 08/29/2023 35.2  31.4 - 37.4 g/dL Final    RDW 08/29/2023 12.9  11.6 - 15.1 % Final    MPV 08/29/2023 9.1  8.9 - 12.7 fL Final    Platelets 37/39/1537 283  149 - 390 Thousands/uL Final    nRBC 08/29/2023 0  /100 WBCs Final    Neutrophils Relative 08/29/2023 56  43 - 75 % Final    Immat GRANS % 08/29/2023 0  0 - 2 % Final    Lymphocytes Relative 08/29/2023 22  14 - 44 % Final    Monocytes Relative 08/29/2023 18 (H)  4 - 12 % Final    Eosinophils Relative 08/29/2023 3  0 - 6 % Final    Basophils Relative 08/29/2023 1  0 - 1 % Final    Neutrophils Absolute 08/29/2023 3.27  1.85 - 7.62 Thousands/µL Final    Immature Grans Absolute 08/29/2023 0.01  0.00 - 0.20 Thousand/uL Final    Lymphocytes Absolute 08/29/2023 1.31  0.60 - 4.47 Thousands/µL Final    Monocytes Absolute 08/29/2023 1.04  0.17 - 1.22 Thousand/µL Final    Eosinophils Absolute 08/29/2023 0.19  0.00 - 0.61 Thousand/µL Final    Basophils Absolute 08/29/2023 0.07  0.00 - 0.10 Thousands/µL Final    Sodium 08/29/2023 135  135 - 147 mmol/L Final    Potassium 08/29/2023 3.9  3.5 - 5.3 mmol/L Final    Chloride 08/29/2023 101  96 - 108 mmol/L Final    CO2 08/29/2023 27  21 - 32 mmol/L Final    ANION GAP 08/29/2023 7  mmol/L Final    BUN 08/29/2023 16  5 - 25 mg/dL Final    Creatinine 08/29/2023 0.75  0.60 - 1.30 mg/dL Final    Standardized to IDMS reference method    Glucose 08/29/2023 112  65 - 140 mg/dL Final    If the patient is fasting, the ADA then defines impaired fasting glucose as > 100 mg/dL and diabetes as > or equal to 123 mg/dL. Calcium 08/29/2023 9.4  8.4 - 10.2 mg/dL Final    AST 08/29/2023 15  13 - 39 U/L Final    ALT 08/29/2023 15  7 - 52 U/L Final    Specimen collection should occur prior to Sulfasalazine administration due to the potential for falsely depressed results. Alkaline Phosphatase 08/29/2023 74  34 - 104 U/L Final    Total Protein 08/29/2023 7.4  6.4 - 8.4 g/dL Final    Albumin 08/29/2023 4.3  3.5 - 5.0 g/dL Final    Total Bilirubin 08/29/2023 0.67  0.20 - 1.00 mg/dL Final    Use of this assay is not recommended for patients undergoing treatment with eltrombopag due to the potential for falsely elevated results. N-acetyl-p-benzoquinone imine (metabolite of Acetaminophen) will generate erroneously low results in samples for patients that have taken an overdose of Acetaminophen. eGFR 08/29/2023 89  ml/min/1.73sq m Final    Protime 08/29/2023 13.3  11.6 - 14.5 seconds Final    INR 08/29/2023 1.00  0.84 - 1.19 Final    PTT 08/29/2023 31  23 - 37 seconds Final    Therapeutic Heparin Range =  60-90 seconds     Assessment/plan:  1. LLE DVT 8/20/2023  2. Current use of DOAC (Eliquis 5 mg twice daily)    Tabitha Orozco is a 16-year-old male with past medical history significant for hypercholesterolemia, HTN and history of pancreatic mass s/p Whipple procedure (benign). Patient initially presented to his PCP on 8/16/2023 with complaints of left lower extremity swelling, redness and throbbing sensation. He had previously been seen in the office on 7/18/2023 for left lower extremity swelling associated with some scratches secondary to splitting firewood. He had been prescribed cefadroxil for cellulitis. He noted improvement in his erythema and swelling after he finished antibiotics, but subsequently noted swelling, erythema and pain. He underwent venous duplex of lower extremity on 8/16/2023 (results not available to me in [facility] or under Care Everywhere).   He was diagnosed with left lower extremity DVT and started on Eliquis. Patient presented to his PCP on 8/29/2023 with increasing pain and swelling in his left. At that time he had been on Eliquis for over a week and noted the pain and swelling is getting worse in his left calf and now his right leg was swollen. He was sent to the ER for further evaluation. Work-up was unremarkable. I met with the patient and his wife. His left lower extremity DVT was likely provoked after leg injury/cellulitis infection. He is currently compliant with Eliquis without any adverse effects. Overall notes improvement after being on anticoagulation. We discussed treatment will likely be for 3-6 months. I have ordered a hypercoagulable work-up for completeness. He notes swelling in his left lower extremity as the day progresses. 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 I have referred patient to vascular surgery for further eval.  He will benefit from compression stocking. He will follow with his PCP for age-appropriate malignancy screening. 3.  History of pancreatic mass  S/p Whipple resection. Notes benign pathology. Follows at [address] This Encounter   Procedures    Lupus anticoagulant    Cardiolipin Ab (IgA)    Cardiolipin Ab (IgG)    Cardiolipin Ab (IgM)    Beta-2 Glycoprotein I Ab, IgA    Beta-2 Glycoprotein I Ab, IgG    Beta-2 Glycoprotein I Ab, IgM    Protein C and S Activity With Reflex To Protein C and/or S Antigen    Prothrombin Gene Mutation    Antithrombin III Activity W/Refl    FACTOR V (LEIDEN) MUTATION ANALYSIS    Ambulatory Referral to Vascular Surgery     Return in about 3 months (around 1/11/2024) for Office Visit. 1. Please request prior venous duplex study from 8/16/23 from PCP office ([address] [place] [address] [phone])  2. Hypercoagulable workup    3.  Referral to vascular surgery  4. FU in 3 months

## 2023-10-24 LAB
APTT SCREEN TO CONFIRM RATIO: 1.07 RATIO (ref 0–1.34)
AT III ACT/NOR PPP CHRO: 148 % (ref 75–135)
B2 GLYCOPROT1 IGA SER-ACNC: 9 GPI IGA UNITS (ref 0–25)
B2 GLYCOPROT1 IGG SER-ACNC: 9 GPI IGG UNITS (ref 0–20)
B2 GLYCOPROT1 IGM SER-ACNC: <9 GPI IGM UNITS (ref 0–32)
CARDIOLIPIN IGA SER IA-ACNC: <9 APL U/ML (ref 0–11)
CARDIOLIPIN IGG SER IA-ACNC: <9 GPL U/ML (ref 0–14)
CARDIOLIPIN IGM SER IA-ACNC: <9 MPL U/ML (ref 0–12)
CONFIRM APTT/NORMAL: 42.8 SEC (ref 0–47.6)
DRVVT IMM 1:2 NP PPP: 53 SEC (ref 0–40.4)
DRVVT SCREEN TO CONFIRM RATIO: 1.2 RATIO (ref 0.8–1.2)
F5 GENE MUT ANL BLD/T: NORMAL
LA PPP-IMP: ABNORMAL
Lab: NORMAL
PROT C ACT/NOR PPP: 137 % (ref 73–180)
PROT C AG PPP IA-ACNC: 101 % (ref 60–150)
PROT S ACT/NOR PPP: 127 % (ref 63–140)
PROT S ACT/NOR PPP: 134 % (ref 61–136)
PROT S PPP-ACNC: 107 % (ref 60–150)
SCREEN APTT: 42.5 SEC (ref 0–43.5)
SCREEN DRVVT: 70.5 SEC (ref 0–47)
THROMBIN TIME: 17.6 SEC (ref 0–23)

## 2023-11-13 ENCOUNTER — TELEPHONE (OUTPATIENT)
Dept: VASCULAR SURGERY | Facility: CLINIC | Age: 78
End: 2023-11-13

## 2023-11-13 NOTE — TELEPHONE ENCOUNTER
Pt is set to see us for DVT. He see HemaOnc for this issue. Not sure if he needs to be managed by VS or not.  Pt also needs to provider prior venous testing from PCP office prior to scheduling OV

## 2023-11-21 ENCOUNTER — OFFICE VISIT (OUTPATIENT)
Dept: FAMILY MEDICINE CLINIC | Facility: CLINIC | Age: 78
End: 2023-11-21
Payer: COMMERCIAL

## 2023-11-21 VITALS
HEIGHT: 72 IN | TEMPERATURE: 97.7 F | OXYGEN SATURATION: 96 % | WEIGHT: 176.6 LBS | SYSTOLIC BLOOD PRESSURE: 136 MMHG | HEART RATE: 70 BPM | DIASTOLIC BLOOD PRESSURE: 72 MMHG | BODY MASS INDEX: 23.92 KG/M2 | RESPIRATION RATE: 16 BRPM

## 2023-11-21 DIAGNOSIS — S70.361A TICK BITE OF RIGHT THIGH, INITIAL ENCOUNTER: Primary | ICD-10-CM

## 2023-11-21 DIAGNOSIS — Z90.81 STATUS POST SPLENECTOMY: ICD-10-CM

## 2023-11-21 DIAGNOSIS — C25.9 MALIGNANT NEOPLASM OF PANCREAS, UNSPECIFIED LOCATION OF MALIGNANCY (HCC): ICD-10-CM

## 2023-11-21 DIAGNOSIS — M25.572 ARTHRALGIA OF LEFT FOOT: ICD-10-CM

## 2023-11-21 DIAGNOSIS — I82.4Y2 ACUTE DEEP VEIN THROMBOSIS (DVT) OF PROXIMAL VEIN OF LEFT LOWER EXTREMITY (HCC): ICD-10-CM

## 2023-11-21 DIAGNOSIS — I10 ESSENTIAL HYPERTENSION: ICD-10-CM
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
## 2023-11-21 PROCEDURE — 99214 OFFICE O/P EST MOD 30 MIN: CPT | Performed by: STUDENT IN AN ORGANIZED HEALTH CARE EDUCATION/TRAINING PROGRAM

## 2023-11-21 RX ORDER — DOXYCYCLINE HYCLATE 100 MG
100 TABLET ORAL 2 TIMES DAILY
Qty: 28 TABLET | Refills: 0 | Status: SHIPPED | OUTPATIENT
Start: 2023-11-21 | End: 2023-12-05

## 2023-11-21 NOTE — PROGRESS NOTES
Clinic Visit Note  Christopher Garcia 70 y.o. male   MRN: [id number]    Assessment and Plan      Diagnoses and all orders for this visit:    Tick bite of right thigh, initial encounter  Given symptoms, s/p splenectomy, recommend doxycycline coverage x14 days, if symptoms worsen or not responding reevaluation encouraged  -     doxycycline hyclate (VIBRA-TABS) 100 mg tablet; Take 1 tablet (100 mg total) by mouth 2 (two) times a day for 14 days    Status post splenectomy    Acute deep vein thrombosis (DVT) of proximal vein of left lower extremity (HCC)  Eliquis anticoagulation, hematology consultation unremarkable, following with vascular to see if repeat scans needed, will need at least 6 months of anticoagulation before determining if anticoagulation can be discontinued with specialty support    Arthralgia of left foot  Inflammatory related? Trial Voltaren gel  -     Diclofenac Sodium (VOLTAREN) 1 %; Apply 2 g topically 2 (two) times a day    Essential hypertension  Blood pressure appropriate today, continue antihypertensive medication    Malignant neoplasm of pancreas, unspecified location of malignancy [facility])  Following with [facility], stable    My impressions and treatment recommendations were discussed in detail with the patient who verbalized understanding and had no further questions. Discharge instructions were provided. Subjective     Chief Complaint: Acute care visit    History of Present Illness:    Patient is a pleasant 51-year-old male coming in for acute care visit secondary to tick bite on right medial thigh with bull's-eye. The following portions of the patient's history were reviewed and updated as appropriate: allergies, current medications, past family history, past medical history, past social history, past surgical history and problem list.    REVIEW OF SYSTEMS:  A complete 12-point review of systems is negative other than that noted in the HPI.     Review of Systems   Constitutional: Negative for chills, fatigue and fever. HENT:  Negative for congestion and sore throat. Eyes:  Negative for pain and visual disturbance. Respiratory:  Negative for shortness of breath and wheezing. Cardiovascular:  Negative for chest pain and palpitations. Gastrointestinal:  Negative for abdominal pain, constipation, diarrhea, nausea and vomiting. Genitourinary:  Negative for dysuria and frequency. Musculoskeletal:  Negative for back pain and neck pain. Skin:  Positive for rash. Negative for color change. Neurological:  Negative for dizziness and headaches. Psychiatric/Behavioral:  Negative for agitation and confusion. All other systems reviewed and are negative. Current Outpatient Medications:   •  alfuzosin (UROXATRAL) 10 mg 24 hr tablet, Take 10 mg by mouth daily. , Disp: , Rfl:   •  amLODIPine (NORVASC) 10 mg tablet, TAKE 1 TABLET BY MOUTH  DAILY, Disp: 90 tablet, Rfl: 3  •  apixaban (ELIQUIS) 5 mg, Take 5 mg by mouth 2 (two) times a day, Disp: , Rfl:   •  Ascorbic Acid (vitamin C) 1000 MG tablet, Take 1,000 mg by mouth daily, Disp: , Rfl:   •  aspirin 81 mg chewable tablet, Chew 81 mg daily  , Disp: , Rfl:   •  carvedilol (COREG) 6.25 mg tablet, TAKE 1 AND 1/2 TABLETS BY  MOUTH TWICE DAILY, Disp: 270 tablet, Rfl: 3  •  cholecalciferol (VITAMIN D3) 1,000 units tablet, Take 5,000 Units by mouth daily, Disp: , Rfl:   •  Diclofenac Sodium (VOLTAREN) 1 %, Apply 2 g topically 2 (two) times a day, Disp: 100 g, Rfl: 0  •  doxycycline hyclate (VIBRA-TABS) 100 mg tablet, Take 1 tablet (100 mg total) by mouth 2 (two) times a day for 14 days, Disp: 28 tablet, Rfl: 0  •  rosuvastatin (CRESTOR) 5 mg tablet, TAKE 1 TABLET BY MOUTH  DAILY, Disp: 90 tablet, Rfl: 3  •  spironolactone-hydrochlorothiazide (ALDACTAZIDE) 25-25 mg per tablet, TAKE ONE-HALF TABLET BY  MOUTH DAILY, Disp: 45 tablet, Rfl: 3  Past Medical History:   Diagnosis Date   • BEP (benign enlargement of prostate)    • Cancer (HCC) pancreatic   whipple procedure  basal cell   • Cardiac murmur    • Colon polyp    • Hyperlipidemia    • Hypertension    • Positive colorectal cancer screening using Cologuard test 01/2021   • PVC (premature ventricular contraction) 1990's    hx of, most recent stress test 2018 - normal results per pt   • Right bundle branch block      Past Surgical History:   Procedure Laterality Date   • APPENDECTOMY     • COLONOSCOPY     • HERNIA REPAIR      right inguinal x2 and left inguinal x1   • PANCREAS SURGERY     • SPLENECTOMY, TOTAL     • WHIPPLE PROCEDURE/PANCREATICO-DUODENECTOMY       Social History     Socioeconomic History   • Marital status: /Civil Union     Spouse name: Not on file   • Number of children: Not on file   • Years of education: Not on file   • Highest education level: Not on file   Occupational History   • Not on file   Tobacco Use   • Smoking status: Never   • Smokeless tobacco: Never   Vaping Use   • Vaping Use: Never used   Substance and Sexual Activity   • Alcohol use: Yes     Alcohol/week: 10.0 standard drinks of alcohol     Types: 10 Cans of beer per week     Comment: 5-6 daily   • Drug use: No   • Sexual activity: Not on file   Other Topics Concern   • Not on file   Social History Narrative   • Not on file     Social Determinants of Health     Financial Resource Strain: Low Risk  (9/12/2023)    Overall Financial Resource Strain (CARDIA)    • Difficulty of Paying Living Expenses: Not very hard   Food Insecurity: Not on file   Transportation Needs: No Transportation Needs (9/12/2023)    PRAPARE - Transportation    • Lack of Transportation (Medical): No    • Lack of Transportation (Non-Medical):  No   Physical Activity: Not on file   Stress: Not on file   Social Connections: Not on file   Intimate Partner Violence: Not on file   Housing Stability: Not on file     Family History   Problem Relation Age of Onset   • Hypertension Mother    • Hypertension Father    • Cancer Father    • Cancer Daughter      No Known Allergies    Objective     Vitals:    11/21/23 1437   BP: 136/72   BP Location: Left arm   Patient Position: Sitting   Cuff Size: Large   Pulse: 70   Resp: 16   Temp: 97.7 °F (36.5 °C)   SpO2: 96%   Weight: 80.1 kg (176 lb 9.6 oz)   Height: 6' (1.829 m)       Physical Exam:     GENERAL: NAD, pleasant   HEENT:  NC/AT, PERRL, EOMI, no scleral icterus  CARDIAC:  RRR, +S1/S2, no S3/S4 appreciated, no m/g/r  PULMONARY:  CTA B/L, no wheezing/rales/rhonci, non-labored breathing  ABDOMEN:  Soft, NT/ND, no rebound/guarding/rigidity  Extremities:. No edema, cyanosis, or clubbing  Musculoskeletal:  Full range of motion intact in all extremities   NEUROLOGIC: Grossly intact, no focal deficits  SKIN:  No rashes or erythema noted on exposed skin  Psych: Normal affect, mood stable    ==  PLEASE NOTE:  This encounter was completed utilizing the M- Mod[employer]Fluency Direct Speech Voice Recognition Software. Grammatical errors, random word insertions, pronoun errors and incomplete sentences are occasional consequences of the system due to software limitations, ambient noise and hardware issues. These may be missed by proof reading prior to affixing electronic signature. Any questions or concerns about the content, text or information contained within the body of this dictation should be directly addressed to the physician for clarification. Please do not hesitate to call me directly if you have any any questions or concerns.     DO [facility]   [facility]

## 2023-11-27 ENCOUNTER — APPOINTMENT (OUTPATIENT)
Dept: LAB | Facility: CLINIC | Age: 78
End: 2023-11-27
Payer: COMMERCIAL

## 2023-11-27 DIAGNOSIS — I82.4Y2 ACUTE DEEP VEIN THROMBOSIS (DVT) OF PROXIMAL VEIN OF LEFT LOWER EXTREMITY (HCC): ICD-10-CM

## 2023-11-27 PROCEDURE — 36415 COLL VENOUS BLD VENIPUNCTURE: CPT

## 2023-12-22 ENCOUNTER — CONSULT (OUTPATIENT)
Dept: VASCULAR SURGERY | Facility: CLINIC | Age: 78
End: 2023-12-22
Payer: COMMERCIAL

## 2023-12-22 VITALS
BODY MASS INDEX: 23.84 KG/M2 | HEIGHT: 72 IN | DIASTOLIC BLOOD PRESSURE: 78 MMHG | SYSTOLIC BLOOD PRESSURE: 132 MMHG | HEART RATE: 70 BPM | WEIGHT: 176 LBS

## 2023-12-22 DIAGNOSIS — I82.452 ACUTE DEEP VEIN THROMBOSIS (DVT) OF LEFT PERONEAL VEIN (HCC): Primary | ICD-10-CM

## 2023-12-22 DIAGNOSIS — I82.492 ACUTE DEEP VEIN THROMBOSIS (DVT) OF OTHER SPECIFIED VEIN OF LEFT LOWER EXTREMITY (HCC): ICD-10-CM

## 2023-12-22 PROCEDURE — 99204 OFFICE O/P NEW MOD 45 MIN: CPT | Performed by: PHYSICIAN ASSISTANT

## 2023-12-22 NOTE — PATIENT INSTRUCTIONS
L DVT    -L calf DVT 8/20236 which has been treated for 4 months on apixaban 5 BID  -Possibly provoked due to injury to the leg  -We had a detailed discussion regarding patient's history and the pathophysiology, treatment and follow-up for DVT.    -Taking apixaban 5 twice daily  -We discussed the role of compression to prevent a post thrombotic syndrome.  -Script for compression garment  -Check LE venous duplex study of the L leg  -Will decide upon DVT prophylaxis with apixaban 2.5 twice daily v aspirin based on risk  -Follow up in about 4-6 weeks for additional recommendations

## 2023-12-22 NOTE — PROGRESS NOTES
"Assessment/Plan:    Acute deep vein thrombosis (DVT) of left peroneal vein (HCC)  -     Compression Stocking  -     VAS lower limb venous duplex study, unilateral/limited; Future  -     Ambulatory Referral to Vascular Surgery      -L peroneal DVT 8/2023; 2 weeks after dropping a wooden board on the leg and developing cellulitis  -LEV 8/16/2023 [facility] - reportedly acute L peroneal DVT (no report or images) and placed on apixaban by PCP  -Hx neuroendocrine tumor s/p distal pancreatectomy; surveillance CT 4/2023 stable without mets; no active CA    Plan:  -L calf DVT 8/2023 which has been treated for 4 months on apixaban 5 BID  -First DVT which was possibly provoked due to injury to the leg  -We had a detailed discussion regarding patient's history and the pathophysiology, treatment and follow-up for DVT.    -Currently taking apixaban 5 twice daily; no issues  -We discussed the role of compression to prevent a post thrombotic syndrome.  -Script for compression garment  -He has upcoming follow up with hematology 1/15/23  -Check LE venous duplex   -Will decide upon DVT prophylaxis with apixaban 2.5 twice daily v aspirin based on risk and hematology evaluation  -Follow up in about 4-6 weeks for additional recommendations      Subjective:      Patient ID: Kevin Schmitz is a 78 y.o. male.  New patient presents for eval of LLE DVT. Patient reports unchanged pain/ swelling. Wears compression and unprovoked.     HPI    MR. Kevin Schmitz 78 yoM BPH, Htn, HLD, Hx neuroendocrine tumor s/p /Whipple/distal pancreatectomy and splenectomy (2016) who is referred for evaluation of L LE DVT 8/2023.     Patient reportedly developed L LE pain, edema and \"rash\" in August.  He went to primary care and was diagnosed with cellulitis.  He was treated with antibiotics for 1 week.  The antibiotics did help with the redness but he continued to have edema for which a venous duplex was ordered and performed at [facility]. I " am unable to view report or images in EPIC but as per PCP, he was noted to have acute L peroneal DVT and was placed on apixaban.  He was also advised and obtained compression stockings.  The patient reports that he continues to have mild discomfort in the left leg.  Leg swelling Is controlled. No problems on a/c other than bruising. He is still taking aspirin. Although he initially denied any trauma.  On further discussion, he does indicate that he dropped a wooden plank which hit the lateral aspect of the left leg a couple weeks before he was diagnosed with DVT.  Although he did not think too much of the impact, he did have a laceration and wife noted that he had had developed some leg swelling even prior to developing cellulitis. He has mild bilateral stasis changes at the medial malleoli, but he denies any prior history of leg swelling. No prior history of DVT.  He has no family history of DVT.  He was seen by hematology and is undergoing a thrombosis panel.  He has follow-up with hematology next month.  We can decide upon DVT prophylaxis versus aspirin.  Patient with prefer to be off of blood thinner, if it is safe to do so.  Since I am unable to see venous duplex images or even a report, will check a venous duplex study in order to provide accurate recommendations.  He is up to date with cancer screenings.  He has an annual CT scan to evaluate history of neuroendocrine tumor which has been negative.  He states that he is up-to-date on his colonoscopy.     Antithrombin   Prothrombin gene mutation pending      CT Pancreas with IV contrast a/p 4/5/23 (Report [facility])  IMPRESSION:   Stable exam. Distal pancreatectomy and splenectomy changes. No evidence of metastatic disease in the chest, abdomen or pelvis       The following portions of the patient's history were reviewed and updated as appropriate: allergies, current medications, past family history, past medical history, past social history,  past surgical history, and problem list.    Review of Systems   Constitutional: Negative.    HENT: Negative.     Eyes: Negative.    Respiratory: Negative.     Cardiovascular:  Positive for leg swelling.   Gastrointestinal: Negative.    Endocrine: Negative.    Genitourinary: Negative.    Musculoskeletal: Negative.    Skin: Negative.    Allergic/Immunologic: Negative.    Neurological: Negative.    Hematological: Negative.    Psychiatric/Behavioral: Negative.           Objective:    /78 (BP Location: Right arm, Patient Position: Sitting, Cuff Size: Standard)   Pulse 70   Ht 6' (1.829 m)   Wt 79.8 kg (176 lb)   BMI 23.87 kg/m²     No significant LE edema. Skin overall healthy and intact. No skin breakdown or wounds. There are mild left leg early chronic stasis changes.    Mild, chronic skin changes in the bilateral medial malleoli.  Calves are nontender.     Physical Exam  Vitals and nursing note reviewed.   Constitutional:       Appearance: He is well-developed.   HENT:      Head: Normocephalic and atraumatic.   Eyes:      Pupils: Pupils are equal, round, and reactive to light.   Neck:      Thyroid: No thyromegaly.      Vascular: No JVD.      Trachea: Trachea normal.   Cardiovascular:      Rate and Rhythm: Normal rate and regular rhythm.      Pulses:           Carotid pulses are 2+ on the right side and 2+ on the left side.       Radial pulses are 2+ on the right side and 2+ on the left side.        Posterior tibial pulses are 2+ on the right side and 2+ on the left side.      Heart sounds: Normal heart sounds, S1 normal and S2 normal. No murmur heard.     No friction rub. No gallop.   Pulmonary:      Effort: Pulmonary effort is normal. No accessory muscle usage or respiratory distress.      Breath sounds: Normal breath sounds. No wheezing or rales.   Abdominal:      General: Bowel sounds are normal. There is no distension.      Palpations: Abdomen is soft.      Tenderness: There is no abdominal tenderness.    Musculoskeletal:         General: No deformity. Normal range of motion.      Cervical back: Neck supple.   Skin:     General: Skin is warm and dry.      Findings: No lesion or rash.      Nails: There is no clubbing.   Neurological:      Mental Status: He is alert and oriented to person, place, and time.      Comments: Grossly normal    Psychiatric:         Behavior: Behavior is cooperative.           I have reviewed and made appropriate changes to the review of systems input by the medical assistant.    Vitals:    12/22/23 1450   BP: 132/78   BP Location: Right arm   Patient Position: Sitting   Cuff Size: Standard   Pulse: 70   Weight: 79.8 kg (176 lb)   Height: 6' (1.829 m)       Patient Active Problem List   Diagnosis    Essential hypertension    Cardiac murmur    Hypercholesterolemia    Benign prostatic hyperplasia with urinary obstruction    Palpitation    Sensorineural hearing loss (SNHL), bilateral    Positive colorectal cancer screening using Cologuard test    Pancreatic mass    Incisional hernia    Colon polyps    Malignant neoplasm of pancreas, unspecified location of malignancy (HCC)    Left leg DVT (HCC)    Local infection of skin and subcutaneous tissue    Status post splenectomy       Past Surgical History:   Procedure Laterality Date    APPENDECTOMY      COLONOSCOPY      HERNIA REPAIR      right inguinal x2 and left inguinal x1    PANCREAS SURGERY      SPLENECTOMY, TOTAL      WHIPPLE PROCEDURE/PANCREATICO-DUODENECTOMY         Family History   Problem Relation Age of Onset    Hypertension Mother     Hypertension Father     Cancer Father     Cancer Daughter        Social History     Socioeconomic History    Marital status: /Civil Union     Spouse name: Not on file    Number of children: Not on file    Years of education: Not on file    Highest education level: Not on file   Occupational History    Not on file   Tobacco Use    Smoking status: Never    Smokeless tobacco: Never   Vaping Use     Vaping status: Never Used   Substance and Sexual Activity    Alcohol use: Yes     Alcohol/week: 10.0 standard drinks of alcohol     Types: 10 Cans of beer per week     Comment: 5-6 daily    Drug use: No    Sexual activity: Not on file   Other Topics Concern    Not on file   Social History Narrative    Not on file     Social Determinants of Health     Financial Resource Strain: Low Risk  (9/12/2023)    Overall Financial Resource Strain (CARDIA)     Difficulty of Paying Living Expenses: Not very hard   Food Insecurity: Unknown (11/5/2021)    [facility]    Hunger Vital Sign     Worried About Running Out of Food in the Last Year: Never true     Ran Out of Food in the Last Year: Not on file   Transportation Needs: No Transportation Needs (9/12/2023)    PRAPARE - Transportation     Lack of Transportation (Medical): No     Lack of Transportation (Non-Medical): No   Physical Activity: Not on file   Stress: No Stress Concern Present (6/21/2021)    Received from [facility] - Occupational Stress Questionnaire     Feeling of Stress : Not at all   Social Connections: Not on file   Intimate Partner Violence: Not on file   Housing Stability: Not on file       No Known Allergies      Current Outpatient Medications:     alfuzosin (UROXATRAL) 10 mg 24 hr tablet, Take 10 mg by mouth daily., Disp: , Rfl:     amLODIPine (NORVASC) 10 mg tablet, TAKE 1 TABLET BY MOUTH  DAILY, Disp: 90 tablet, Rfl: 3    apixaban (ELIQUIS) 5 mg, Take 5 mg by mouth 2 (two) times a day, Disp: , Rfl:     Ascorbic Acid (vitamin C) 1000 MG tablet, Take 1,000 mg by mouth daily, Disp: , Rfl:     aspirin 81 mg chewable tablet, Chew 81 mg daily  , Disp: , Rfl:     carvedilol (COREG) 6.25 mg tablet, TAKE 1 AND 1/2 TABLETS BY  MOUTH TWICE DAILY, Disp: 270 tablet, Rfl: 3    cholecalciferol (VITAMIN D3) 1,000 units tablet, Take 5,000 Units by mouth daily, Disp: , Rfl:     Diclofenac Sodium (VOLTAREN) 1  %, Apply 2 g topically 2 (two) times a day, Disp: 100 g, Rfl: 0    rosuvastatin (CRESTOR) 5 mg tablet, TAKE 1 TABLET BY MOUTH  DAILY, Disp: 90 tablet, Rfl: 3    spironolactone-hydrochlorothiazide (ALDACTAZIDE) 25-25 mg per tablet, TAKE ONE-HALF TABLET BY  MOUTH DAILY, Disp: 45 tablet, Rfl: 3

## 2024-01-02 ENCOUNTER — OFFICE VISIT (OUTPATIENT)
Dept: CARDIOLOGY CLINIC | Facility: CLINIC | Age: 79
End: 2024-01-02
Payer: COMMERCIAL

## 2024-01-02 VITALS
OXYGEN SATURATION: 99 % | BODY MASS INDEX: 24.11 KG/M2 | HEIGHT: 72 IN | WEIGHT: 178 LBS | HEART RATE: 60 BPM | DIASTOLIC BLOOD PRESSURE: 70 MMHG | SYSTOLIC BLOOD PRESSURE: 124 MMHG

## 2024-01-02 DIAGNOSIS — I11.9 HYPERTENSIVE HEART DISEASE WITHOUT HEART FAILURE: ICD-10-CM

## 2024-01-02 DIAGNOSIS — R00.2 PALPITATION: Primary | ICD-10-CM

## 2024-01-02 DIAGNOSIS — E78.00 HYPERCHOLESTEROLEMIA: ICD-10-CM

## 2024-01-02 DIAGNOSIS — R01.1 CARDIAC MURMUR: ICD-10-CM

## 2024-01-02 DIAGNOSIS — I10 ESSENTIAL HYPERTENSION: ICD-10-CM

## 2024-01-02 DIAGNOSIS — E78.2 MIXED HYPERLIPIDEMIA: ICD-10-CM

## 2024-01-02 PROCEDURE — 93000 ELECTROCARDIOGRAM COMPLETE: CPT | Performed by: PHYSICIAN ASSISTANT

## 2024-01-02 PROCEDURE — 99214 OFFICE O/P EST MOD 30 MIN: CPT | Performed by: PHYSICIAN ASSISTANT

## 2024-01-02 RX ORDER — CARVEDILOL 6.25 MG/1
12.5 TABLET ORAL 2 TIMES DAILY
Qty: 240 TABLET | Refills: 0 | Status: SHIPPED | OUTPATIENT
Start: 2024-01-02 | End: 2024-03-02

## 2024-01-02 NOTE — PROGRESS NOTES
Progress Note - Cardiology Office  [facility]    Kevin Schmitz 78 y.o. male MRN: [id number]  : 1945  Encounter: [id number]      Assessment:     Palpitations.  Essential hypertension.  Hyperlipidemia.  Left lower extremity DVT.  Pancreatic mass.    Discussion Summary and Plan:    Palpitations.  - Last seen by outpatient cardiology office on 2023. Since last office visit patient reports he is continuing to have episodes of palpitations, not worse than normal.  He is concerned by his ambulatory Holter monitor results from 2023. 23 extended holter monitor noted 1 episodes of short atrial run of 17 beats with a heart rate 175 bpm, otherwise no significant tachycardia arrhythmia.   - Patient denies episodes of palpitations or associated with any additional symptoms including chest pain, shortness of breath, lightheadedness, dizziness, headache, nausea, vomiting or diaphoresis.  - 24 EKG: Normal sinus rhythm, 60 bpm.  - Currently on Coreg 9.375 mg twice daily.  - Increase coreg from 9.375 mg twice daily to 12.5 mg twice daily.  - 23 ambulatory extended Holter monitor: Patient had a min HR of 49 bpm, max HR of 176 bpm, and avg HR of 68 bpm. Predominant underlying rhythm was Sinus Rhythm. 25 Supraventricular Tachycardia runs occurred, the run with the fastest interval lasting 17 beats with a max rate of 176 bpm, the longest lasting 16 beats with an avg rate of 105 bpm. Isolated SVEs were rare (<1.0%), SVE Couplets were rare (<1.0%), and SVE Triplets were rare (<1.0%).  Isolated VEs were rare (<1.0%), and no VE Couplets or VE Triplets were present.  - 22 stress test only, exercise: Stress ECG: The ECG was equivocal for ischemia. There were no acute st change to 73% of MPHR. The ECG was not diagnostic due to submaximal stress. Right bundle branch block was seen. Stress ECG: A Troy protocol stress test was performed. The patient reached stage 3.0 of the  protocol after exercising for 7 min and had a maximal HR of 107 bpm (73 % of MPHR) and 10.1 METS. The patient experienced no angina during the test. The test was stopped because the patient experienced leg pain. The patient reported leg pain during the stress test. Onset of symptoms occurred at stage 3 of the protocol. Symptoms ended during recovery. Blood pressure demonstrated a normal response and heart rate demonstrated a normal response to stress. The patient's heart rate recovery was normal. Consider stress imaging for improve risk delineation.  - TTE ordered.    Essential hypertension.  - BP during today's office visit, 124/70.   - Currently on amlodipine 10 mg daily, Coreg 9.375 mg twice daily, and spironolactone-hydrochlorothiazide 12.5-12.5 mg daily.   - Increase coreg from 9.375 mg twice daily to 12.5 mg twice daily.  - TTE ordered.  - BMP ordered.    Hyperlipidemia.  - Continue Crestor 5 mg daily.  - 10/14/22 lipid panel: Cholesterol 59, triglycerides 75, HDL 50, LDL 87.  - Repeat lipid panel ordered.    Left lower extremity DVT.  - Diagnosed with left peroneal DVT in August 2023.  - Currently on Eliquis 5 mg twice daily.  - Last seen by [facility] hematology on 10/11/2023.  Reported plan for anticoagulation for 3 to 6 months.  - Seen by [facility] vascular surgery on 12/22/2023.    Pancreatic mass.  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  - Patient was seen at [facility] on 4/6/2023. Was told to follow-up within 10 years.      Patient / Caretaker was advised and educated to call our office  immediately if  patient has any new symptoms of chest pain/shortness of breath, near-syncope, syncope, light headedness sustained palpitations  or any other cardiovascular symptoms before their scheduled follow-up appointment.  Office number was provided #[phone].  Please call [phone] if any questions.  Counseling :  A description of the  counseling.  Goals and Barriers.  Patient's ability to self care: Yes  Medication side effect reviewed with patient in detail and all their questions answered to their satisfaction.    HPI :     Kevin Schmitz is a 78 y.o. male with PMHx of HTN, HLD, LLE DVT (on Eliquis), pancreatic neuroendocrine tumor s/p distal pancreatectomy and splenectomy (5/2016), who presents for routine outpatient cardiology follow-up.     Last seen by outpatient cardiology office on 5/30/2023. Since last office visit patient reports he is continuing to have episodes of palpitations, not worse than normal.  He is concerned by his ambulatory Holter monitor results from September 2023. 9/19/23 extended holter monitor noted 1 episodes of short atrial run of 17 beats with a heart rate 175 bpm, otherwise no significant tachycardia arrhythmia.        Patient denies episodes of palpitations or associated with any additional symptoms including chest pain, shortness of breath, lightheadedness, dizziness, headache, nausea, vomiting or diaphoresis.    Review of Systems   Constitutional:  Negative for activity change, appetite change, chills, diaphoresis, fatigue, fever and unexpected weight change.   Respiratory:  Negative for cough, chest tightness, shortness of breath and wheezing.    Cardiovascular:  Positive for palpitations. Negative for chest pain and leg swelling.   Gastrointestinal:  Negative for abdominal distention, abdominal pain, diarrhea, nausea and vomiting.   Skin: Negative.    Neurological:  Negative for dizziness, syncope, weakness, light-headedness and headaches.       Historical Information   Past Medical History:   Diagnosis Date    BEP (benign enlargement of prostate)     Cancer (HCC)     pancreatic   whipple procedure  basal cell    Cardiac murmur     Colon polyp     Hyperlipidemia     Hypertension     Positive colorectal cancer screening using Cologuard test 01/2021    PVC (premature ventricular contraction) 1990's    hx of,  most recent stress test 2018 - normal results per pt    Right bundle branch block      Past Surgical History:   Procedure Laterality Date    APPENDECTOMY      COLONOSCOPY      HERNIA REPAIR      right inguinal x2 and left inguinal x1    PANCREAS SURGERY      SPLENECTOMY, TOTAL      WHIPPLE PROCEDURE/PANCREATICO-DUODENECTOMY       Social History     Substance and Sexual Activity   Alcohol Use Yes    Alcohol/week: 10.0 standard drinks of alcohol    Types: 10 Cans of beer per week    Comment: 5-6 daily     Social History     Substance and Sexual Activity   Drug Use No     Social History     Tobacco Use   Smoking Status Never   Smokeless Tobacco Never     Family History:   Family History   Problem Relation Age of Onset    Hypertension Mother     Hypertension Father     Cancer Father     Cancer Daughter        Meds/Allergies     No Known Allergies    Current Outpatient Medications:     alfuzosin (UROXATRAL) 10 mg 24 hr tablet, Take 10 mg by mouth daily., Disp: , Rfl:     amLODIPine (NORVASC) 10 mg tablet, TAKE 1 TABLET BY MOUTH  DAILY, Disp: 90 tablet, Rfl: 3    apixaban (ELIQUIS) 5 mg, Take 5 mg by mouth 2 (two) times a day, Disp: , Rfl:     Ascorbic Acid (vitamin C) 1000 MG tablet, Take 1,000 mg by mouth daily, Disp: , Rfl:     aspirin 81 mg chewable tablet, Chew 81 mg daily  , Disp: , Rfl:     carvedilol (COREG) 6.25 mg tablet, TAKE 1 AND 1/2 TABLETS BY  MOUTH TWICE DAILY, Disp: 270 tablet, Rfl: 3    cholecalciferol (VITAMIN D3) 1,000 units tablet, Take 5,000 Units by mouth daily, Disp: , Rfl:     Diclofenac Sodium (VOLTAREN) 1 %, Apply 2 g topically 2 (two) times a day, Disp: 100 g, Rfl: 0    rosuvastatin (CRESTOR) 5 mg tablet, TAKE 1 TABLET BY MOUTH  DAILY, Disp: 90 tablet, Rfl: 3    spironolactone-hydrochlorothiazide (ALDACTAZIDE) 25-25 mg per tablet, TAKE ONE-HALF TABLET BY  MOUTH DAILY, Disp: 45 tablet, Rfl: 3    Vitals: There were no vitals taken for this visit.    There is no height or weight on file to  calculate BMI.  Wt Readings from Last 3 Encounters:   23 79.8 kg (176 lb)   23 80.1 kg (176 lb 9.6 oz)   10/11/23 79.8 kg (176 lb)     There were no vitals filed for this visit.  BP Readings from Last 3 Encounters:   23 132/78   23 136/72   10/11/23 126/74       Physical Exam:  Physical Exam  Vitals reviewed.   Constitutional:       General: He is not in acute distress.  Cardiovascular:      Rate and Rhythm: Normal rate and regular rhythm.      Pulses: Normal pulses.      Heart sounds: Murmur heard.   Pulmonary:      Effort: Pulmonary effort is normal. No respiratory distress.      Breath sounds: Normal breath sounds.   Abdominal:      General: Abdomen is flat. There is no distension.      Palpations: Abdomen is soft.      Tenderness: There is no abdominal tenderness.   Musculoskeletal:      Right lower leg: No edema.      Left lower leg: No edema.   Skin:     General: Skin is warm and dry.   Neurological:      Mental Status: He is alert and oriented to person, place, and time.     Diagnostic Studies Review Cardio:      EK/02/24 EKG: Normal sinus rhythm, 60 bpm.    Cardiac testing:     Stress test only, exercise  Date: 22      Stress ECG: The ECG was equivocal for ischemia. There were no acute st change to 73% of MPHR. The ECG was not diagnostic due to submaximal stress. Right bundle branch block was seen.    Stress ECG: A Troy protocol stress test was performed. The patient reached stage 3.0 of the protocol after exercising for 7 min and had a maximal HR of 107 bpm (73 % of MPHR) and 10.1 METS. The patient experienced no angina during the test. The test was stopped because the patient experienced leg pain. The patient reported leg pain during the stress test. Onset of symptoms occurred at stage 3 of the protocol. Symptoms ended during recovery. Blood pressure demonstrated a normal response and heart rate demonstrated a normal response to stress. The patient's heart rate  recovery was normal.    Consider stress imaging for improve risk delineation.      Results for orders placed during the hospital encounter of 17    Echo complete with contrast if indicated    Narrative  [address] [address] [phone]    Transthoracic Echocardiogram  2D, M-mode, Doppler, and Color Doppler    Study date:  2017    Patient: MARIETTA EUCEDA  MR number: [id number]  Account number: [id number]  : 1945  Age: 71 years  Gender: Male  Status: Routine  Location: Echo lab  Height: 72 in  Weight: 184.6 lb  BP: 118/ 64 mmHg    Indications: Murmur    Diagnoses: 785.2 - CARDIAC MURMURS NEC    Sonographer:  SUNNY Darby  Primary Physician:  Mynor Martinez DO  Referring Physician:  Andrei Small MD  Group:  [facility]  Interpreting Physician:  Alisa Ha DO    SUMMARY    LEFT VENTRICLE:  Systolic function was normal by visual assessment. Ejection fraction was estimated in the range of 55 % to 60 %.  There were no regional wall motion abnormalities.  There was mild concentric hypertrophy.  Doppler parameters were consistent with abnormal left ventricular relaxation (grade 1 diastolic dysfunction).    MITRAL VALVE:  There was trace regurgitation.    AORTIC VALVE:  There was no evidence for stenosis.  There was mild to moderate regurgitation.    TRICUSPID VALVE:  There was mild to moderate regurgitation.  Pulmonary artery systolic pressure was within the normal range.    HISTORY: PRIOR HISTORY: HTN, Hyperlipidemia, Pancreatic Cancer    PROCEDURE: The procedure was performed in the echo lab. This was a routine study. The transthoracic approach was used. The study included complete 2D imaging, M-mode, complete spectral Doppler, and color Doppler. The heart rate was 65 bpm,  at the start of the study. Image quality was adequate.    LEFT VENTRICLE: Size was normal. Systolic function was normal by visual assessment. Ejection  fraction was estimated in the range of 55 % to 60 %. There were no regional wall motion abnormalities. There was mild concentric hypertrophy.  DOPPLER: Doppler parameters were consistent with abnormal left ventricular relaxation (grade 1 diastolic dysfunction).    RIGHT VENTRICLE: The size was normal. Systolic function was normal. DOPPLER: Systolic pressure was within the normal range.    LEFT ATRIUM: The atrium was mildly dilated.    RIGHT ATRIUM: Size was normal.    MITRAL VALVE: Valve structure was normal. There was normal leaflet separation. No echocardiographic evidence for prolapse. DOPPLER: The transmitral velocity was within the normal range. There was no evidence for stenosis. There was trace  regurgitation.    AORTIC VALVE: The valve was trileaflet. Leaflets exhibited normal cuspal separation and sclerosis. DOPPLER: Transaortic velocity was within the normal range. There was no evidence for stenosis. There was mild to moderate regurgitation.    TRICUSPID VALVE: The valve structure was normal. There was normal leaflet separation. DOPPLER: The transtricuspid velocity was within the normal range. There was mild to moderate regurgitation. Pulmonary artery systolic pressure was within  the normal range. Estimated peak PA pressure was 36 mmHg.    PULMONIC VALVE: Leaflets exhibited normal thickness, no calcification, and normal cuspal separation. DOPPLER: The transpulmonic velocity was within the normal range. There was no regurgitation.    PERICARDIUM: There was no thickening. There was no pericardial effusion.    AORTA: The root exhibited normal size.    PULMONARY ARTERY: The size was normal. The morphology appeared normal.    SYSTEM MEASUREMENT TABLES    2D mode  AoR Diam 2D: 3.5 cm  LA Diam (2D): 4.5 cm  LA/Ao (2D): 1.29  FS (2D Teich): 38.4 %  IVSd (2D): 1.04 cm  LVDEV: 84 cm³  LVESV: 26 cm³  LVIDd(2D): 4.32 cm  LVISd (2D): 2.66 cm  LVPWd (2D): 1.12 cm  SV (Teich): 58 cm³    Apical four chamber  LVEF A4C: 55  "%    Apical two chamber  LA Area: 20.7 cm squared  LA Volume: 59 cm³    Unspecified Scan Mode  MV Peak A Cy: 970 mm/s  MV Peak E Cy. Mean: 831 mm/s  MVA (PHT): 3.44 cm squared  PHT: 64 ms  Max P mm[Hg]  V Max: 2470 mm/s  Vmax: 2560 mm/s  RA Area: 16.7 cm squared  RA Volume: 42.1 cm³  TAPSE: 2.7 cm    [facility] Accredited Echocardiography Laboratory    Prepared and electronically signed by    Alisa Ha DO  Signed 2017 11:38:55      Imaging:  Chest X-Ray:   No Chest XR results available for this patient.    CT-scan of the chest:     No CTA results available for this patient.  Lab Review   Lab Results   Component Value Date    WBC 5.89 2023    HGB 15.1 2023    HCT 42.9 2023    MCV 91 2023    RDW 12.9 2023     2023     BMP:  Lab Results   Component Value Date    SODIUM 135 2023    K 3.9 2023     2023    CO2 27 2023    BUN 16 2023    CREATININE 0.75 2023    GLUC 112 2023    CALCIUM 9.4 2023    EGFR 89 2023     Troponins:    LFT:  Lab Results   Component Value Date    AST 15 2023    ALT 15 2023    ALKPHOS 74 2023    TP 7.4 2023    ALB 4.3 2023      No components found for: \"TSH3\"  No results found for: \"[id number]\"  No results found for: \"HGBA1C\"  Lipid Profile:   Lab Results   Component Value Date    CHOLESTEROL 159 10/14/2022    HDL 58 10/14/2022    LDLCALC 87 10/14/2022    TRIG 75 10/14/2022     Lab Results   Component Value Date    CHOLESTEROL 159 10/14/2022    CHOLESTEROL 207 (H) 10/13/2020     Lab Results   Component Value Date    TROPONINI 0.02 2016     No results found for: \"NTBNP\"   No results found for this or any previous visit (from the past 672 hour(s)).          Susan Mohr PA-C  "

## 2024-01-03 DIAGNOSIS — G47.33 OSA (OBSTRUCTIVE SLEEP APNEA): Primary | ICD-10-CM

## 2024-01-08 ENCOUNTER — HOSPITAL ENCOUNTER (OUTPATIENT)
Dept: RADIOLOGY | Facility: HOSPITAL | Age: 79
Discharge: HOME | End: 2024-01-08
Payer: COMMERCIAL

## 2024-01-08 DIAGNOSIS — I82.452 ACUTE DEEP VEIN THROMBOSIS (DVT) OF LEFT PERONEAL VEIN (HCC): ICD-10-CM

## 2024-01-08 PROCEDURE — 93971 EXTREMITY STUDY: CPT | Performed by: SURGERY

## 2024-01-08 PROCEDURE — 93971 EXTREMITY STUDY: CPT

## 2024-01-14 NOTE — PROGRESS NOTES
Kevin Schmitz  1945  [address] [facility] [place]  [address]  [place] [address]    No chief complaint on file.    Assessment/plan:   1.  LLE DVT 8/20/2023  2.  Current use of DOAC (Eliquis 5 mg twice daily)     Kevin Schmitz is a 78-year-old male with past medical history significant for hypercholesterolemia, HTN and history of pancreatic mass s/p Whipple procedure (benign).   Patient developed a left peroneal DVT in August 2023 2 weeks after dropping a wooden board on the leg and developing cellulitis.  He was started on Eliquis by his PCP.  He has been evaluated by vascular surgery in the interim and recommended compression stockings to prevent a post thrombotic syndrome.  Repeat lower extremity Doppler in January 2024 notes resolution of thrombus.    We discussed this is his first DVT which is likely provoked due to leg injury.  He has completed greater than 3 months of anticoagulation with Eliquis 5 mg twice daily.  Repeat lower extremity Doppler notes resolution of thrombus.  Hypercoagulable workup for anticardiolipin antibodies, beta-2 glycoprotein antibodies, Antithrombin III was unremarkable.  Factor V Leiden and lupus anticoagulant was not detected.  Protein C/S within normal limits.  Prothrombin gene mutation not drawn due to insurance.  Lengthy discussion with the patient.  Since this is a provoked first thrombotic event, he has completed recommended short-term anticoagulation.  From a hematologic perspective, indefinite anticoagulation is not needed at this time.  We did discuss signs and symptoms of recurrent thrombosis and patient advised to seek immediate medical attention.  Patient will continue to follow with his PCP and vascular surgery.  Hematology follow-up on an as-needed basis.     3.  History of pancreatic mass  S/p Whipple resection.  Notes benign pathology.  Follows at [facility]      History of present illness:    Kevin Schmitz is a 78-year-old male with past medical history significant for hypercholesterolemia, HTN and history of pancreatic mass s/p Whipple procedure (benign).      Patient initially presented to his PCP on 8/16/2023 with complaints of left lower extremity swelling, redness and throbbing sensation.  He had previously been seen in the office on 7/18/2023 for left lower extremity swelling associated with some scratches secondary to splitting firewood.  He had been prescribed cefadroxil for cellulitis.  He noted improvement in his erythema and swelling after he finished antibiotics, but subsequently noted swelling, erythema and pain. He underwent venous duplex of lower extremity on 8/16/2023 (results not available to me in EPIC or under Care Everywhere).  He was diagnosed with left lower extremity DVT and started on Eliquis.     Patient presented to his PCP on 8/29/2023 with increasing pain and swelling in his left.  At that time he had been on Eliquis for over a week and noted the pain and swelling is getting worse in his left calf and now his right leg was swollen.  He was sent to the ER for further evaluation.  Work-up was unremarkable.     He presents for initial hematology evaluation accompanied by his wife for visit.  He is currently taking eliquis twice daily since 8/2023. Denies any dark stools, blood in stools or hematuria. Denies any CP, SOB or palpitations. States as the day progresses he notices swelling in LLE.      He denies any personal or family history of blood clots.  He does not smoke.  Denies recent travel.  He is otherwise active. Denies recent illness or COVID infection.      3/31/22: S/p colonsocpy   History of colon polyps 4 small polyps removed today, diverticulosis, external hemorrhoids, family history colon cancer daughter in her 40s father in his 80s.  Nonspecific erosions small on ileocecal valve of questionable significance  He was recommended repeat colonoscopy in 3 years due  to history of colon polyps.    Interval history:  Patient has now been on treatment with Eliquis 5 milligram twice daily since August 2023.  He was evaluated by vascular surgery in the interim and recommended compression garment to prevent post thrombotic syndrome.  His repeat lower extremity Doppler on 1/8/2024 noted resolution of thrombus.    Review of systems:   Review of Systems   Constitutional:  Negative for chills, fatigue and fever.   Eyes:  Negative for pain and visual disturbance.   Respiratory:  Negative for cough and shortness of breath.    Cardiovascular:  Negative for chest pain and palpitations.   Gastrointestinal:  Negative for abdominal pain and vomiting.   Genitourinary:  Negative for dysuria and hematuria.   Musculoskeletal:  Negative for arthralgias and back pain.   Skin:  Negative for color change and rash.   Neurological:  Negative for seizures and syncope.   All other systems reviewed and are negative.    Patient Active Problem List   Diagnosis    Essential hypertension    Cardiac murmur    Hypercholesterolemia    Benign prostatic hyperplasia with urinary obstruction    Palpitation    Sensorineural hearing loss (SNHL), bilateral    Positive colorectal cancer screening using Cologuard test    Pancreatic mass    Incisional hernia    Colon polyps    Malignant neoplasm of pancreas, unspecified location of malignancy (HCC)    Left leg DVT (HCC)    Local infection of skin and subcutaneous tissue    Status post splenectomy    Acute deep vein thrombosis (DVT) of left peroneal vein (HCC)     Past Medical History:   Diagnosis Date    BEP (benign enlargement of prostate)     Cancer (HCC)     pancreatic   whipple procedure  basal cell    Cardiac murmur     Colon polyp     Hyperlipidemia     Hypertension     Positive colorectal cancer screening using Cologuard test 01/2021    PVC (premature ventricular contraction) 1990's    hx of, most recent stress test 2018 - normal results per pt    Right bundle branch  block      Past Surgical History:   Procedure Laterality Date    APPENDECTOMY      COLONOSCOPY      HERNIA REPAIR      right inguinal x2 and left inguinal x1    PANCREAS SURGERY      SPLENECTOMY, TOTAL      WHIPPLE PROCEDURE/PANCREATICO-DUODENECTOMY       Family History   Problem Relation Age of Onset    Hypertension Mother     Hypertension Father     Cancer Father     Cancer Daughter      Social History     Socioeconomic History    Marital status: /Civil Union     Spouse name: Not on file    Number of children: Not on file    Years of education: Not on file    Highest education level: Not on file   Occupational History    Not on file   Tobacco Use    Smoking status: Never    Smokeless tobacco: Never   Vaping Use    Vaping status: Never Used   Substance and Sexual Activity    Alcohol use: Yes     Alcohol/week: 10.0 standard drinks of alcohol     Types: 10 Cans of beer per week     Comment: 5-6 daily    Drug use: No    Sexual activity: Not on file   Other Topics Concern    Not on file   Social History Narrative    Not on file     Social Determinants of Health     Financial Resource Strain: Low Risk  (9/12/2023)    Overall Financial Resource Strain (CARDIA)     Difficulty of Paying Living Expenses: Not very hard   Food Insecurity: Unknown (11/5/2021)    Received from [facility]    Hunger Vital Sign     Worried About Running Out of Food in the Last Year: Never true     Ran Out of Food in the Last Year: Not on file   Transportation Needs: No Transportation Needs (9/12/2023)    PRAPARE - Transportation     Lack of Transportation (Medical): No     Lack of Transportation (Non-Medical): No   Physical Activity: Not on file   Stress: No Stress Concern Present (6/21/2021)    Received from [facility] - Occupational Stress Questionnaire     Feeling of Stress : Not at all   Social Connections: Not on file   Intimate Partner Violence: Not on file   Housing Stability: Not  on file       Current Outpatient Medications:     alfuzosin (UROXATRAL) 10 mg 24 hr tablet, Take 10 mg by mouth daily., Disp: , Rfl:     amLODIPine (NORVASC) 10 mg tablet, TAKE 1 TABLET BY MOUTH  DAILY, Disp: 90 tablet, Rfl: 3    Ascorbic Acid (vitamin C) 1000 MG tablet, Take 1,000 mg by mouth daily, Disp: , Rfl:     aspirin 81 mg chewable tablet, Chew 81 mg daily  , Disp: , Rfl:     carvedilol (COREG) 6.25 mg tablet, Take 2 tablets (12.5 mg total) by mouth 2 (two) times a day, Disp: 240 tablet, Rfl: 0    cholecalciferol (VITAMIN D3) 1,000 units tablet, Take 5,000 Units by mouth daily, Disp: , Rfl:     Diclofenac Sodium (VOLTAREN) 1 %, Apply 2 g topically 2 (two) times a day, Disp: 100 g, Rfl: 0    rosuvastatin (CRESTOR) 5 mg tablet, TAKE 1 TABLET BY MOUTH  DAILY, Disp: 90 tablet, Rfl: 3    spironolactone-hydrochlorothiazide (ALDACTAZIDE) 25-25 mg per tablet, TAKE ONE-HALF TABLET BY  MOUTH DAILY, Disp: 45 tablet, Rfl: 3    apixaban (ELIQUIS) 5 mg, Take 5 mg by mouth 2 (two) times a day, Disp: , Rfl:   No Known Allergies    Vitals:    01/15/24 1128   BP: 122/72   Pulse: 68   Resp: 18   Temp: 97.6 °F (36.4 °C)   SpO2: 96%     Wt Readings from Last 3 Encounters:   01/15/24 80.7 kg (178 lb)   01/02/24 80.7 kg (178 lb)   12/22/23 79.8 kg (176 lb)     Physical Exam  Vitals reviewed.   Constitutional:       General: He is not in acute distress.     Appearance: Normal appearance.   HENT:      Head: Normocephalic and atraumatic.      Mouth/Throat:      Mouth: Mucous membranes are moist.   Eyes:      Extraocular Movements: Extraocular movements intact.      Conjunctiva/sclera: Conjunctivae normal.   Cardiovascular:      Rate and Rhythm: Normal rate.   Pulmonary:      Effort: Pulmonary effort is normal. No respiratory distress.   Musculoskeletal:      Cervical back: Normal range of motion.      Right lower leg: No edema.      Left lower leg: No edema.   Skin:     General: Skin is warm.      Coloration: Skin is not jaundiced.    Neurological:      General: No focal deficit present.      Mental Status: He is alert and oriented to person, place, and time. Mental status is at baseline.      Gait: Gait normal.   Psychiatric:         Thought Content: Thought content normal.       Labs:  2023: WBC: 5.89, H/H: 15.1/42.9, MCV: 91, platelets: 283.  10/19/2023: Hypercoagulable workup:  Anticardiolipin antibody IgG/IgM/IgA: <9  Beta-2 glycoprotein IgG/IgA: 9, IgM: <9  Antithrombin III activity: Unremarkable  Factor V Leiden: Not detected  Lupus anticoagulant: Not detected  Protein C/S: Within normal limits  Prothrombin gene mutation: Pending    Imagin2024: Lower extremity Doppler:  RIGHT LOWER LIMB LIMITED:  Evaluation shows no evidence of thrombus in the common femoral vein.  Doppler evaluation shows a normal response to augmentation maneuvers.   LEFT LOWER LIMB:  No evidence of acute or chronic deep vein thrombosis  No evidence of superficial thrombophlebitis noted.  Doppler evaluation shows a normal response to augmentation maneuvers.  Popliteal, posterior tibial and anterior tibial arterial Doppler waveform's are triphasic.   In comparison to the study of 2023 at [facility]. there is resolution  of thrombus.    Return if symptoms worsen or fail to improve.

## 2024-01-15 ENCOUNTER — OFFICE VISIT (OUTPATIENT)
Dept: HEMATOLOGY ONCOLOGY | Facility: CLINIC | Age: 79
End: 2024-01-15
Payer: COMMERCIAL

## 2024-01-15 ENCOUNTER — TELEPHONE (OUTPATIENT)
Dept: HEMATOLOGY ONCOLOGY | Facility: CLINIC | Age: 79
End: 2024-01-15

## 2024-01-15 VITALS
SYSTOLIC BLOOD PRESSURE: 122 MMHG | TEMPERATURE: 97.6 F | HEART RATE: 68 BPM | RESPIRATION RATE: 18 BRPM | WEIGHT: 178 LBS | OXYGEN SATURATION: 96 % | BODY MASS INDEX: 24.11 KG/M2 | HEIGHT: 72 IN | DIASTOLIC BLOOD PRESSURE: 72 MMHG

## 2024-01-15 DIAGNOSIS — I82.4Y2 ACUTE DEEP VEIN THROMBOSIS (DVT) OF PROXIMAL VEIN OF LEFT LOWER EXTREMITY (HCC): Primary | ICD-10-CM

## 2024-01-15 PROCEDURE — 99214 OFFICE O/P EST MOD 30 MIN: CPT | Performed by: INTERNAL MEDICINE

## 2024-01-15 NOTE — TELEPHONE ENCOUNTER
Appointment Confirmation   Who are you speaking with? Patient   If it is not the patient, are they listed on an active communication consent form? N/A   Which provider is the appointment scheduled with?  Dr. Duque   When is the appointment scheduled?  Please list date and time 01/15/2024 @ 11AM    At which location is the appointment scheduled to take place? [facility]   Did caller verbalize understanding of appointment details? Yes

## 2024-01-22 ENCOUNTER — TELEPHONE (OUTPATIENT)
Dept: CARDIOLOGY CLINIC | Facility: CLINIC | Age: 79
End: 2024-01-22

## 2024-01-28 DIAGNOSIS — I10 ESSENTIAL HYPERTENSION: ICD-10-CM

## 2024-01-28 DIAGNOSIS — E78.00 HYPERCHOLESTEROLEMIA: ICD-10-CM

## 2024-01-29 RX ORDER — AMLODIPINE BESYLATE 10 MG/1
TABLET ORAL
Qty: 90 TABLET | Refills: 3 | Status: SHIPPED | OUTPATIENT
Start: 2024-01-29

## 2024-01-29 RX ORDER — ROSUVASTATIN CALCIUM 5 MG/1
TABLET, COATED ORAL
Qty: 90 TABLET | Refills: 3 | Status: SHIPPED | OUTPATIENT
Start: 2024-01-29

## 2024-02-01 PROBLEM — Z86.718 HISTORY OF DVT OF LOWER EXTREMITY: Status: ACTIVE | Noted: 2023-12-22

## 2024-02-01 NOTE — PATIENT INSTRUCTIONS
"    DVT     Presumed provoked DVT due to injury. No prior history of DVT or increased risk factors for recurrent DVT that we are aware of at this time.   Completed 5 months of full anticoagulation and can stop. Hematology consult appreciated. Recurrent DVT may require \"lifelong\" anticoagulation.     -Will discontinue anticoagulation   -May wear compression  -Patient education regarding DVT and treatment discussed  -Follow up with vascular surgery as needed      "

## 2024-02-02 ENCOUNTER — TELEMEDICINE (OUTPATIENT)
Dept: VASCULAR SURGERY | Facility: CLINIC | Age: 79
End: 2024-02-02
Payer: COMMERCIAL

## 2024-02-02 DIAGNOSIS — Z86.718 HISTORY OF DVT OF LOWER EXTREMITY: Primary | ICD-10-CM

## 2024-02-02 PROCEDURE — 99213 OFFICE O/P EST LOW 20 MIN: CPT | Performed by: PHYSICIAN ASSISTANT

## 2024-02-02 NOTE — PROGRESS NOTES
"Virtual Regular Visit    Verification of patient location:  Patient is located at Other in the following state in which I hold an active license [place]      Assessment/Plan:    Problem List Items Addressed This Visit       History of DVT of lower extremity - Primary     Deep vein thrombosis (DVT) of left peroneal vein (HCC) - 8/16/23  -L peroneal DVT 8/2023; 2 weeks after dropping a wooden board on the leg and developing cellulitis    -LEV 8/16/2023 [facility] - reportedly acute L peroneal DVT (no report or images) and placed on apixaban by PCP  -LEV 1/8/24: L No evidence of DVT or SVT. Compared with [facility] study 8/16/23 DVT resolved.   -Hx neuroendocrine tumor s/p distal pancreatectomy; surveillance CT 4/2023 stable without mets; no active CA     Plan:  Presumed provoked DVT due to injury. No prior history of DVT or increased risk factors for recurrent DVT that we are aware of at this time.   Completed 5 months of full anticoagulation and can stop. Hematology consult appreciated. Recurrent DVT may require \"lifelong\" anticoagulation.     -L calf DVT 8/2023 which has been treated for 5 months on apixaban 5 BID  -First DVT which was possibly provoked due to injury to the leg  -Repeat duplex 1/8/24 does not show evidence of acute or chronic DVT  -Hematology consult appreciated  -Will discontinue anticoagulation   -May wear compression  -Patient education regarding DVT and treatment discussed  -Follow up with vascular surgery as needed      Reason for visit is follow up DVT    Chief Complaint   Patient presents with    Virtual Regular Visit        Encounter provider Shireen Batres PA-C    Provider located at [facility] [address] [address]      Recent Visits  No visits were found meeting these conditions.  Showing recent visits within past 7 days and meeting all other requirements  Today's Visits  Date Type Provider Dept   02/02/24 " "Telemedicine Shireen Batres PA-C [facility]   Showing today's visits and meeting all other requirements  Future Appointments  No visits were found meeting these conditions.  Showing future appointments within next 150 days and meeting all other requirements       The patient was identified by name and date of birth. Kevin Schmitz was informed that this is a telemedicine visit and that the visit is being conducted through Telephone.  My office door was closed. No one else was in the room.  He acknowledged consent and understanding of privacy and security of the video platform. The patient has agreed to participate and understands they can discontinue the visit at any time.    Patient is aware this is a billable service.     Subjective    HPI       . Kevin Schmitz 78 yoM BPH, Htn, HLD, Hx neuroendocrine tumor s/p /Whipple/distal pancreatectomy and splenectomy (2016) who is referred for evaluation of L LE DVT 8/2023.      Patient reportedly developed L LE pain, edema and \"rash\" in August.  He went to primary care and was diagnosed with cellulitis.  He was treated with antibiotics for 1 week.  The antibiotics did help with the redness but he continued to have edema for which a venous duplex was ordered and performed at [facility]. I am unable to view report or images in EPIC but as per PCP, he was noted to have acute L peroneal DVT and was placed on apixaban.  He was also advised and obtained compression stockings.  The patient reports that he continues to have mild discomfort in the left leg.  Leg swelling Is controlled. No problems on a/c other than bruising. He is still taking aspirin. Although he initially denied any trauma.  On further discussion, he does indicate that he dropped a wooden plank which hit the lateral aspect of the left leg a couple weeks before he was diagnosed with DVT.  Although he did not think too much of the impact, he did have a laceration and wife noted that he had had " developed some leg swelling even prior to developing cellulitis. He has mild bilateral stasis changes at the medial malleoli, but he denies any prior history of leg swelling. No prior history of DVT.  He has no family history of DVT.  He was seen by hematology and is undergoing a thrombosis panel.  He has follow-up with hematology next month.  We can decide upon DVT prophylaxis versus aspirin.  Patient with prefer to be off of blood thinner, if it is safe to do so.  Since I am unable to see venous duplex images or even a report, will check a venous duplex study in order to provide accurate recommendations.  He is up to date with cancer screenings.  He has an annual CT scan to evaluate history of neuroendocrine tumor which has been negative.  He states that he is up-to-date on his colonoscopy.      Antithrombin   Prothrombin gene mutation- not done       2/1/24: Status post left lower extremity/calf DVT.  Doing well.  Walking without limitation.  No leg pain or edema.  No chest pain or SOB.  Hematology consultation appreciated. They find that long-term/indefinite anticoagulation is not needed at this time.  We did discussed symptoms which would be concerning for which he should call or go to ED. We also discussed that should he have recurrent DVT, long-term/lifelong anticoagulation may need to be considered.    LEV 1/8/24  CLINICAL:  Indications:  Patient presents to determine propagation vs resolution of previously noted DVT  in the left lower extremity discovered August 2023.  Patient reports a tightness  feeling at the ankle and above the knee.  Operative History:  No past cardiovascular surgeries.  Risk Factors:  The patient has history of HTN and Hyperlipidemia.        CONCLUSION:     Impression:  RIGHT LOWER LIMB LIMITED:  Evaluation shows no evidence of thrombus in the common femoral vein.  Doppler evaluation shows a normal response to augmentation maneuvers.     LEFT LOWER LIMB:  No evidence of acute  or chronic deep vein thrombosis  No evidence of superficial thrombophlebitis noted.  Doppler evaluation shows a normal response to augmentation maneuvers.  Popliteal, posterior tibial and anterior tibial arterial Doppler waveform's are  triphasic.     In comparison to the study of 8/16/2023 at [facility]. there is resolution  of thrombus.      Past Medical History:   Diagnosis Date    BEP (benign enlargement of prostate)     Cancer (HCC)     pancreatic   whipple procedure  basal cell    Cardiac murmur     Colon polyp     Hyperlipidemia     Hypertension     Positive colorectal cancer screening using Cologuard test 01/2021    PVC (premature ventricular contraction) 1990's    hx of, most recent stress test 2018 - normal results per pt    Right bundle branch block        Past Surgical History:   Procedure Laterality Date    APPENDECTOMY      COLONOSCOPY      HERNIA REPAIR      right inguinal x2 and left inguinal x1    PANCREAS SURGERY      SPLENECTOMY, TOTAL      WHIPPLE PROCEDURE/PANCREATICO-DUODENECTOMY         Current Outpatient Medications   Medication Sig Dispense Refill    alfuzosin (UROXATRAL) 10 mg 24 hr tablet Take 10 mg by mouth daily.      amLODIPine (NORVASC) 10 mg tablet TAKE 1 TABLET BY MOUTH DAILY 90 tablet 3    apixaban (ELIQUIS) 5 mg Take 5 mg by mouth 2 (two) times a day      Ascorbic Acid (vitamin C) 1000 MG tablet Take 1,000 mg by mouth daily      aspirin 81 mg chewable tablet Chew 81 mg daily        carvedilol (COREG) 6.25 mg tablet Take 2 tablets (12.5 mg total) by mouth 2 (two) times a day 240 tablet 0    cholecalciferol (VITAMIN D3) 1,000 units tablet Take 5,000 Units by mouth daily      Diclofenac Sodium (VOLTAREN) 1 % Apply 2 g topically 2 (two) times a day 100 g 0    rosuvastatin (CRESTOR) 5 mg tablet TAKE 1 TABLET BY MOUTH DAILY 90 tablet 3    spironolactone-hydrochlorothiazide (ALDACTAZIDE) 25-25 mg per tablet TAKE ONE-HALF TABLET BY  MOUTH DAILY 45 tablet 3     No current  facility-administered medications for this visit.        No Known Allergies    Review of Systems  No chest pain, SOB.     Video Exam    There were no vitals filed for this visit.    Physical Exam     Visit Time  Total Visit Duration: 15

## 2024-03-08 DIAGNOSIS — I10 ESSENTIAL HYPERTENSION: ICD-10-CM

## 2024-03-11 RX ORDER — CARVEDILOL 6.25 MG/1
12.5 TABLET ORAL 2 TIMES DAILY
Qty: 240 TABLET | Refills: 5 | Status: SHIPPED | OUTPATIENT
Start: 2024-03-11

## 2024-04-22 ENCOUNTER — NURSE TRIAGE (OUTPATIENT)
Age: 79
End: 2024-04-22

## 2024-04-22 ENCOUNTER — HOSPITAL ENCOUNTER (EMERGENCY)
Facility: HOSPITAL | Age: 79
Discharge: HOME | End: 2024-04-22
Attending: EMERGENCY MEDICINE | Admitting: EMERGENCY MEDICINE
Payer: COMMERCIAL

## 2024-04-22 ENCOUNTER — APPOINTMENT (EMERGENCY)
Dept: RADIOLOGY | Facility: HOSPITAL | Age: 79
End: 2024-04-22
Payer: COMMERCIAL

## 2024-04-22 VITALS
RESPIRATION RATE: 20 BRPM | TEMPERATURE: 96.9 F | HEART RATE: 62 BPM | SYSTOLIC BLOOD PRESSURE: 152 MMHG | DIASTOLIC BLOOD PRESSURE: 76 MMHG | OXYGEN SATURATION: 98 %

## 2024-04-22 DIAGNOSIS — M79.662 PAIN OF LEFT CALF: Primary | ICD-10-CM

## 2024-04-22 PROCEDURE — 99284 EMERGENCY DEPT VISIT MOD MDM: CPT

## 2024-04-22 PROCEDURE — 99284 EMERGENCY DEPT VISIT MOD MDM: CPT | Performed by: EMERGENCY MEDICINE

## 2024-04-22 PROCEDURE — 93971 EXTREMITY STUDY: CPT | Performed by: SURGERY

## 2024-04-22 PROCEDURE — 93971 EXTREMITY STUDY: CPT

## 2024-04-22 NOTE — TELEPHONE ENCOUNTER
"Pt calling with LLE warmth.  Pt states it also has an achey feeling in his calf down to his ankle.  Pt states that this is how his leg felt when he had a previous DVT.  Pt is not currently anticoagulated, however, he states that he had a few Eliquis pills left over from his previous DVT, so he started them over the weekend.  Pt will go to [facility] ED for evaluation.       Reason for Disposition   History of prior 'blood clot' in leg or lungs (i.e., deep vein thrombosis, pulmonary embolism)    Answer Assessment - Initial Assessment Questions  1. ONSET: \"When did the pain start?\"       04/20/2024  2. LOCATION: \"Where is the pain located?\"       [place]  3. PAIN: \"How bad is the pain?\"    (Scale 1-10; or mild, moderate, severe)    -  MILD (1-3): doesn't interfere with normal activities     -  MODERATE (4-7): interferes with normal activities (e.g., work or school) or awakens from sleep, limping     -  SEVERE (8-10): excruciating pain, unable to do any normal activities, unable to walk      1/10  4. WORK OR EXERCISE: \"Has there been any recent work or exercise that involved this part of the body?\"       No   5. CAUSE: \"What do you think is causing the leg pain?\"       I think I might have another DVT   6. OTHER SYMPTOMS: \"Do you have any other symptoms?\" (e.g., chest pain, back pain, breathing difficulty, swelling, rash, fever, numbness, weakness)      no  7. PREGNANCY: \"Is there any chance you are pregnant?\" \"When was your last menstrual period?\"      no    Protocols used: Leg Pain-ADULT-[place]    "

## 2024-04-22 NOTE — ED PROVIDER NOTES
History  Chief Complaint   Patient presents with    Leg Pain     Pt reports of l calf tightness started SAT night, hx of dvt     Pt is a 79yo M who presents for leg tightness.  Patient reports that on Friday he began with left calf tightness.  Patient states that this is similar to what he had last year when he was diagnosed with a DVT.  Patient states that because of this he began retaking his Eliquis over the weekend.  Patient states that he was on Eliquis for his DVT last year but did not require lifetime coagulation and therefore it was discontinued in January.  Patient states he has not taken it since until this weekend.  Patient states no swelling noted in the leg.  Patient denies any trauma or injury to the leg.  Patient states he has otherwise been feeling well.  Patient denies any chest pain or shortness of breath.        Prior to Admission Medications   Prescriptions Last Dose Informant Patient Reported? Taking?   Ascorbic Acid (vitamin C) 1000 MG tablet  Self Yes No   Sig: Take 1,000 mg by mouth daily   Diclofenac Sodium (VOLTAREN) 1 %  Self No No   Sig: Apply 2 g topically 2 (two) times a day   alfuzosin (UROXATRAL) 10 mg 24 hr tablet  Self Yes No   Sig: Take 10 mg by mouth daily.   amLODIPine (NORVASC) 10 mg tablet   No No   Sig: TAKE 1 TABLET BY MOUTH DAILY   apixaban (ELIQUIS) 5 mg  Self Yes No   Sig: Take 5 mg by mouth 2 (two) times a day   aspirin 81 mg chewable tablet  Self Yes No   Sig: Chew 81 mg daily     carvedilol (COREG) 6.25 mg tablet   No No   Sig: TAKE 2 TABLETS BY MOUTH TWICE  DAILY   cholecalciferol (VITAMIN D3) 1,000 units tablet  Self Yes No   Sig: Take 5,000 Units by mouth daily   rosuvastatin (CRESTOR) 5 mg tablet   No No   Sig: TAKE 1 TABLET BY MOUTH DAILY   spironolactone-hydrochlorothiazide (ALDACTAZIDE) 25-25 mg per tablet  Self No No   Sig: TAKE ONE-HALF TABLET BY  MOUTH DAILY      Facility-Administered Medications: None       Past Medical History:   Diagnosis Date    BEP  (benign enlargement of prostate)     Cancer (HCC)     pancreatic   whipple procedure  basal cell    Cardiac murmur     Colon polyp     Hyperlipidemia     Hypertension     Positive colorectal cancer screening using Cologuard test 01/2021    PVC (premature ventricular contraction) 1990's    hx of, most recent stress test 2018 - normal results per pt    Right bundle branch block        Past Surgical History:   Procedure Laterality Date    APPENDECTOMY      COLONOSCOPY      HERNIA REPAIR      right inguinal x2 and left inguinal x1    PANCREAS SURGERY      SPLENECTOMY, TOTAL      WHIPPLE PROCEDURE/PANCREATICO-DUODENECTOMY         Family History   Problem Relation Age of Onset    Hypertension Mother     Hypertension Father     Cancer Father     Cancer Daughter      I have reviewed and agree with the history as documented.    E-Cigarette/Vaping    E-Cigarette Use Never User      E-Cigarette/Vaping Substances    Nicotine No     THC No     CBD No     Flavoring No     Other No     Unknown No      Social History     Tobacco Use    Smoking status: Never    Smokeless tobacco: Never   Vaping Use    Vaping status: Never Used   Substance Use Topics    Alcohol use: Yes     Alcohol/week: 10.0 standard drinks of alcohol     Types: 10 Cans of beer per week     Comment: 5-6 daily    Drug use: No       Physical Exam  Physical Exam  Vitals reviewed.   Constitutional:       General: He is not in acute distress.     Appearance: He is well-developed. He is not toxic-appearing or diaphoretic.   HENT:      Head: Normocephalic and atraumatic.      Right Ear: External ear normal.      Left Ear: External ear normal.      Nose: Nose normal.      Mouth/Throat:      Pharynx: Oropharynx is clear.   Eyes:      Extraocular Movements: Extraocular movements intact.      Pupils: Pupils are equal, round, and reactive to light.   Cardiovascular:      Rate and Rhythm: Normal rate and regular rhythm.      Heart sounds: Normal heart sounds.   Pulmonary:       Effort: Pulmonary effort is normal. No respiratory distress.      Breath sounds: Normal breath sounds.   Abdominal:      General: Bowel sounds are normal. There is no distension.      Palpations: Abdomen is soft.      Tenderness: There is no abdominal tenderness.   Musculoskeletal:         General: Normal range of motion.      Cervical back: Normal range of motion and neck supple.      Right lower leg: No edema.      Left lower leg: No edema.   Skin:     General: Skin is warm and dry.      Capillary Refill: Capillary refill takes less than 2 seconds.      Coloration: Skin is not pale.      Findings: No erythema or rash.   Neurological:      General: No focal deficit present.      Mental Status: He is alert and oriented to person, place, and time.   Psychiatric:         Speech: Speech normal.         Behavior: Behavior is cooperative.         Vital Signs  ED Triage Vitals [04/22/24 1109]   Temperature Pulse Respirations Blood Pressure SpO2   (!) 96.9 °F (36.1 °C) 62 20 152/76 98 %      Temp src Heart Rate Source Patient Position - Orthostatic VS BP Location FiO2 (%)   -- Monitor -- -- --      Pain Score       --           Vitals:    04/22/24 1109   BP: 152/76   Pulse: 62         Visual Acuity      ED Medications  Medications - No data to display    Diagnostic Studies  Results Reviewed       None                   VAS lower limb venous duplex study, unilateral/limited    (Results Pending)              Procedures  Procedures         ED Course  ED Course as of 04/22/24 1846   Mon Apr 22, 2024   1217 Per vascular, negative for acute DVT.    1219 Pt reassessed and resting comfortably. CMS intact distally. No further w/u indicated at this time. Discussed with pt plan for DC with PCP follow-up. Pt agreeable.                                              Medical Decision Making  Pt is a 79yo M who presents with left calf tightness.     Differential diagnosis to include but not limited to DVT, muscle strain, trauma.  Will plan  for duplex.  See ED course for results and details.    Plan to discharge pt with f/u to PCP. Discussed returning the ED with new or worsening of symptoms. Discussed use of over the counter medications as stated on the bottle as needed for pain. Pt expressed understanding of discharge instructions, return precautions, and medication instructions and is stable for discharge at this time. All questions were answered and pt was discharged without incident.                Disposition  Final diagnoses:   Pain of left calf     Time reflects when diagnosis was documented in both MDM as applicable and the Disposition within this note       Time User Action Codes Description Comment    4/22/2024 12:20 PM Francie Morris [M79.662] Pain of left calf           ED Disposition       ED Disposition   Discharge    Condition   Stable    Date/Time   Mon Apr 22, 2024 12:20 PM    Comment   Kevin Schmitz discharge to home/self care.                   Follow-up Information       Follow up With Specialties Details Why Contact Info    Kashmir Oleary DO Family Medicine, Internal Medicine Call on 4/22/2024  [address] [facility] [address]  [phone]              Discharge Medication List as of 4/22/2024 12:21 PM        CONTINUE these medications which have NOT CHANGED    Details   alfuzosin (UROXATRAL) 10 mg 24 hr tablet Take 10 mg by mouth daily., Historical Med      amLODIPine (NORVASC) 10 mg tablet TAKE 1 TABLET BY MOUTH DAILY, Normal      apixaban (ELIQUIS) 5 mg Take 5 mg by mouth 2 (two) times a day, Starting Wed 8/23/2023, Until Tue 1/2/2024, Historical Med      Ascorbic Acid (vitamin C) 1000 MG tablet Take 1,000 mg by mouth daily, Historical Med      aspirin 81 mg chewable tablet Chew 81 mg daily  , Historical Med      carvedilol (COREG) 6.25 mg tablet TAKE 2 TABLETS BY MOUTH TWICE  DAILY, Starting Mon 3/11/2024, Normal      cholecalciferol (VITAMIN D3) 1,000 units tablet Take 5,000 Units by mouth daily,  Historical Med      Diclofenac Sodium (VOLTAREN) 1 % Apply 2 g topically 2 (two) times a day, Starting Tue 11/21/2023, Normal      rosuvastatin (CRESTOR) 5 mg tablet TAKE 1 TABLET BY MOUTH DAILY, Normal      spironolactone-hydrochlorothiazide (ALDACTAZIDE) 25-25 mg per tablet TAKE ONE-HALF TABLET BY  MOUTH DAILY, Normal             No discharge procedures on file.    PDMP Review       None            ED Provider  Electronically Signed by             Francie Morris MD  04/22/24 6026

## 2024-04-22 NOTE — TELEPHONE ENCOUNTER
Regarding: DVT  ----- Message from Sandra Mott sent at 4/22/2024  9:13 AM EDT -----  Patient calling as he states he is experiencing symptoms of a DVT.  He stated he had one last summer and what he is experiencing now is similar to last summer.  He states there is tightness in his leg, its warm to the touch and achey.  Called Nurse Triage line and unavailable.    Please call patient [phone]

## 2024-04-29 ENCOUNTER — OFFICE VISIT (OUTPATIENT)
Dept: FAMILY MEDICINE CLINIC | Facility: CLINIC | Age: 79
End: 2024-04-29
Payer: COMMERCIAL

## 2024-04-29 VITALS
SYSTOLIC BLOOD PRESSURE: 110 MMHG | HEART RATE: 66 BPM | DIASTOLIC BLOOD PRESSURE: 70 MMHG | BODY MASS INDEX: 24.92 KG/M2 | RESPIRATION RATE: 14 BRPM | OXYGEN SATURATION: 97 % | WEIGHT: 184 LBS | TEMPERATURE: 98.4 F | HEIGHT: 72 IN

## 2024-04-29 DIAGNOSIS — C25.9 MALIGNANT NEOPLASM OF PANCREAS, UNSPECIFIED LOCATION OF MALIGNANCY (HCC): ICD-10-CM

## 2024-04-29 DIAGNOSIS — I82.452 ACUTE DEEP VEIN THROMBOSIS (DVT) OF LEFT PERONEAL VEIN (HCC): ICD-10-CM

## 2024-04-29 DIAGNOSIS — M76.812 ANTERIOR TIBIALIS TENDINITIS OF LEFT LEG: Primary | ICD-10-CM

## 2024-04-29 DIAGNOSIS — I10 ESSENTIAL HYPERTENSION: ICD-10-CM

## 2024-04-29 DIAGNOSIS — K63.5 POLYP OF COLON, UNSPECIFIED PART OF COLON, UNSPECIFIED TYPE: ICD-10-CM

## 2024-04-29 DIAGNOSIS — Z90.81 STATUS POST SPLENECTOMY: ICD-10-CM

## 2024-04-29 DIAGNOSIS — Z86.718 HISTORY OF DVT OF LOWER EXTREMITY: ICD-10-CM

## 2024-04-29 PROBLEM — R19.5 POSITIVE COLORECTAL CANCER SCREENING USING COLOGUARD TEST: Status: RESOLVED | Noted: 2021-02-03 | Resolved: 2024-04-29

## 2024-04-29 PROCEDURE — G2211 COMPLEX E/M VISIT ADD ON: HCPCS | Performed by: STUDENT IN AN ORGANIZED HEALTH CARE EDUCATION/TRAINING PROGRAM

## 2024-04-29 PROCEDURE — 3725F SCREEN DEPRESSION PERFORMED: CPT | Performed by: STUDENT IN AN ORGANIZED HEALTH CARE EDUCATION/TRAINING PROGRAM

## 2024-04-29 PROCEDURE — 1160F RVW MEDS BY RX/DR IN RCRD: CPT | Performed by: STUDENT IN AN ORGANIZED HEALTH CARE EDUCATION/TRAINING PROGRAM

## 2024-04-29 PROCEDURE — 3074F SYST BP LT 130 MM HG: CPT | Performed by: STUDENT IN AN ORGANIZED HEALTH CARE EDUCATION/TRAINING PROGRAM

## 2024-04-29 PROCEDURE — 3078F DIAST BP <80 MM HG: CPT | Performed by: STUDENT IN AN ORGANIZED HEALTH CARE EDUCATION/TRAINING PROGRAM

## 2024-04-29 PROCEDURE — 99214 OFFICE O/P EST MOD 30 MIN: CPT | Performed by: STUDENT IN AN ORGANIZED HEALTH CARE EDUCATION/TRAINING PROGRAM

## 2024-04-29 PROCEDURE — 1159F MED LIST DOCD IN RCRD: CPT | Performed by: STUDENT IN AN ORGANIZED HEALTH CARE EDUCATION/TRAINING PROGRAM

## 2024-04-29 NOTE — PROGRESS NOTES
Clinic Visit Note  Kevin Schmitz 78 y.o. male   MRN: [id number]    Assessment and Plan      Diagnoses and all orders for this visit:    Anterior tibialis tendinitis of left leg  Symptoms consistent with musculoskeletal tendinitis, continue rest, Voltaren gel twice daily, stretching/strengthening exercises, DVT ultrasound negative, follow-up if symptoms worsen or not improving    Malignant neoplasm of pancreas, unspecified location of malignancy (HCC)  Following with [facility], stable    Essential hypertension  Blood pressure appropriate, continue antihypertensive regimen    Polyp of colon, unspecified part of colon, unspecified type  Follow-up colonoscopy    History of DVT of lower extremity  Acute deep vein thrombosis (DVT) of left peroneal vein (HCC)  Patient is now off Eliquis    Status post splenectomy    My impressions and treatment recommendations were discussed in detail with the patient who verbalized understanding and had no further questions.  Discharge instructions were provided.    Subjective     Chief Complaint: F/U    History of Present Illness:    Patient is a pleasant 78-year-old male coming in for follow-up after left anterior leg pain with intermittent swelling, previous left leg DVT.    The following portions of the patient's history were reviewed and updated as appropriate: allergies, current medications, past family history, past medical history, past social history, past surgical history and problem list.    REVIEW OF SYSTEMS:  A complete 12-point review of systems is negative other than that noted in the HPI.    Review of Systems   Constitutional:  Negative for chills, fatigue and fever.   HENT:  Negative for congestion and sore throat.    Eyes:  Negative for pain and visual disturbance.   Respiratory:  Negative for shortness of breath and wheezing.    Cardiovascular:  Negative for chest pain and palpitations.   Gastrointestinal:  Negative for abdominal pain, constipation, diarrhea,  nausea and vomiting.   Genitourinary:  Negative for dysuria and frequency.   Musculoskeletal:  Negative for back pain and neck pain.   Skin:  Negative for color change and rash.   Neurological:  Negative for dizziness and headaches.   Psychiatric/Behavioral:  Negative for agitation and confusion.    All other systems reviewed and are negative.        Current Outpatient Medications:   •  alfuzosin (UROXATRAL) 10 mg 24 hr tablet, Take 10 mg by mouth daily., Disp: , Rfl:   •  amLODIPine (NORVASC) 10 mg tablet, TAKE 1 TABLET BY MOUTH DAILY, Disp: 90 tablet, Rfl: 3  •  Ascorbic Acid (vitamin C) 1000 MG tablet, Take 1,000 mg by mouth daily, Disp: , Rfl:   •  aspirin 81 mg chewable tablet, Chew 81 mg daily  , Disp: , Rfl:   •  carvedilol (COREG) 6.25 mg tablet, TAKE 2 TABLETS BY MOUTH TWICE  DAILY, Disp: 240 tablet, Rfl: 5  •  cholecalciferol (VITAMIN D3) 1,000 units tablet, Take 5,000 Units by mouth daily, Disp: , Rfl:   •  Diclofenac Sodium (VOLTAREN) 1 %, Apply 2 g topically 2 (two) times a day, Disp: 100 g, Rfl: 0  •  rosuvastatin (CRESTOR) 5 mg tablet, TAKE 1 TABLET BY MOUTH DAILY, Disp: 90 tablet, Rfl: 3  •  spironolactone-hydrochlorothiazide (ALDACTAZIDE) 25-25 mg per tablet, TAKE ONE-HALF TABLET BY  MOUTH DAILY, Disp: 45 tablet, Rfl: 3  Past Medical History:   Diagnosis Date   • BEP (benign enlargement of prostate)    • Cancer (HCC)     pancreatic   whipple procedure  basal cell   • Cardiac murmur    • Colon polyp    • Hyperlipidemia    • Hypertension    • Positive colorectal cancer screening using Cologuard test 01/2021   • PVC (premature ventricular contraction) 1990's    hx of, most recent stress test 2018 - normal results per pt   • Right bundle branch block      Past Surgical History:   Procedure Laterality Date   • APPENDECTOMY     • COLONOSCOPY     • HERNIA REPAIR      right inguinal x2 and left inguinal x1   • PANCREAS SURGERY     • SPLENECTOMY, TOTAL     • WHIPPLE PROCEDURE/PANCREATICO-DUODENECTOMY        Social History     Socioeconomic History   • Marital status: /Civil Union     Spouse name: Not on file   • Number of children: Not on file   • Years of education: Not on file   • Highest education level: Not on file   Occupational History   • Not on file   Tobacco Use   • Smoking status: Never   • Smokeless tobacco: Never   Vaping Use   • Vaping status: Never Used   Substance and Sexual Activity   • Alcohol use: Yes     Alcohol/week: 10.0 standard drinks of alcohol     Types: 10 Cans of beer per week     Comment: 5-6 daily   • Drug use: No   • Sexual activity: Not on file   Other Topics Concern   • Not on file   Social History Narrative   • Not on file     Social Determinants of Health     Financial Resource Strain: Low Risk  (9/12/2023)    Overall Financial Resource Strain (CARDIA)    • Difficulty of Paying Living Expenses: Not very hard   Food Insecurity: Unknown (11/5/2021)    Received from [facility] Vital Sign    • Worried About Running Out of Food in the Last Year: Never true    • Ran Out of Food in the Last Year: Not on file   Transportation Needs: No Transportation Needs (9/12/2023)    PRAPARE - Transportation    • Lack of Transportation (Medical): No    • Lack of Transportation (Non-Medical): No   Physical Activity: Not on file   Stress: No Stress Concern Present (6/21/2021)    Received from [facility]    [employer] - Occupational Stress Questionnaire    • Feeling of Stress : Not at all   Social Connections: Not on file   Intimate Partner Violence: Not on file   Housing Stability: Low Risk  (4/22/2024)    Received from [facility]    Housing Stability    • Unstable Housing in the Last Year: Not on file     Family History   Problem Relation Age of Onset   • Hypertension Mother    • Hypertension Father    • Cancer Father    • Cancer Daughter      No Known Allergies    Objective     Vitals:    04/29/24 0834   BP: 110/70   Patient Position:  Sitting   Cuff Size: Standard   Pulse: 66   Resp: 14   Temp: 98.4 °F (36.9 °C)   TempSrc: Temporal   SpO2: 97%   Weight: 83.5 kg (184 lb)   Height: 6' (1.829 m)       Physical Exam:     GENERAL: NAD, pleasant   HEENT:  NC/AT, PERRL, EOMI, no scleral icterus  CARDIAC:  RRR, +S1/S2, no S3/S4 appreciated, no m/g/r  PULMONARY:  CTA B/L, no wheezing/rales/rhonci, non-labored breathing  ABDOMEN:  Soft, NT/ND, no rebound/guarding/rigidity  Extremities:. No edema, cyanosis, or clubbing  Musculoskeletal:  Full range of motion intact in all extremities   NEUROLOGIC: Grossly intact, no focal deficits  SKIN:  No rashes or erythema noted on exposed skin  Psych: Normal affect, mood stable    ==  PLEASE NOTE:  This encounter was completed utilizing the [employer] Direct Speech Voice Recognition Software. Grammatical errors, random word insertions, pronoun errors and incomplete sentences are occasional consequences of the system due to software limitations, ambient noise and hardware issues.These may be missed by proof reading prior to affixing electronic signature. Any questions or concerns about the content, text or information contained within the body of this dictation should be directly addressed to the physician for clarification. Please do not hesitate to call me directly if you have any any questions or concerns.    Kashmir Oleary DO  [facility] Internal Medicine   [facility]      22-Mar-2024 13:54

## 2024-05-02 ENCOUNTER — TELEPHONE (OUTPATIENT)
Age: 79
End: 2024-05-02

## 2024-05-02 DIAGNOSIS — E87.8 ELECTROLYTE ABNORMALITY: Primary | ICD-10-CM

## 2024-05-02 NOTE — TELEPHONE ENCOUNTER
Patient called and is going for a follow up CT scan for [facility] need to have a metabolic panel and creatnine done. Would like to come to office and  goes to [facility] Please call patient at [phone] when orders placed.

## 2024-05-06 ENCOUNTER — LAB (OUTPATIENT)
Dept: LAB | Facility: CLINIC | Age: 79
End: 2024-05-06
Payer: COMMERCIAL

## 2024-05-06 DIAGNOSIS — E87.8 ELECTROLYTE ABNORMALITY: ICD-10-CM

## 2024-05-06 LAB
ALBUMIN SERPL BCP-MCNC: 4.5 G/DL (ref 3.5–5)
ALP SERPL-CCNC: 63 U/L (ref 34–104)
ALT SERPL W P-5'-P-CCNC: 31 U/L (ref 7–52)
ANION GAP SERPL CALCULATED.3IONS-SCNC: 9 MMOL/L (ref 4–13)
AST SERPL W P-5'-P-CCNC: 23 U/L (ref 13–39)
BILIRUB SERPL-MCNC: 0.76 MG/DL (ref 0.2–1)
BUN SERPL-MCNC: 15 MG/DL (ref 5–25)
CALCIUM SERPL-MCNC: 9 MG/DL (ref 8.4–10.2)
CHLORIDE SERPL-SCNC: 101 MMOL/L (ref 96–108)
CO2 SERPL-SCNC: 29 MMOL/L (ref 21–32)
CREAT SERPL-MCNC: 0.77 MG/DL (ref 0.6–1.3)
GFR SERPL CREATININE-BSD FRML MDRD: 88 ML/MIN/1.73SQ M
GLUCOSE P FAST SERPL-MCNC: 112 MG/DL (ref 65–99)
POTASSIUM SERPL-SCNC: 4.2 MMOL/L (ref 3.5–5.3)
PROT SERPL-MCNC: 7.3 G/DL (ref 6.4–8.4)
SODIUM SERPL-SCNC: 139 MMOL/L (ref 135–147)

## 2024-05-06 PROCEDURE — 36415 COLL VENOUS BLD VENIPUNCTURE: CPT

## 2024-05-06 PROCEDURE — 80053 COMPREHEN METABOLIC PANEL: CPT

## 2024-05-09 DIAGNOSIS — I10 ESSENTIAL HYPERTENSION: ICD-10-CM

## 2024-05-10 RX ORDER — SPIRONOLACTONE AND HYDROCHLOROTHIAZIDE 25; 25 MG/1; MG/1
TABLET ORAL
Qty: 135 TABLET | Refills: 1 | Status: SHIPPED | OUTPATIENT
Start: 2024-05-10

## 2024-06-06 ENCOUNTER — OFFICE VISIT (OUTPATIENT)
Dept: FAMILY MEDICINE CLINIC | Facility: CLINIC | Age: 79
End: 2024-06-06
Payer: COMMERCIAL

## 2024-06-06 VITALS
TEMPERATURE: 97.8 F | WEIGHT: 174 LBS | OXYGEN SATURATION: 97 % | BODY MASS INDEX: 23.57 KG/M2 | SYSTOLIC BLOOD PRESSURE: 140 MMHG | DIASTOLIC BLOOD PRESSURE: 78 MMHG | HEART RATE: 72 BPM | RESPIRATION RATE: 16 BRPM | HEIGHT: 72 IN

## 2024-06-06 DIAGNOSIS — Z86.718 HISTORY OF DVT OF LOWER EXTREMITY: ICD-10-CM

## 2024-06-06 DIAGNOSIS — E78.00 HYPERCHOLESTEROLEMIA: ICD-10-CM

## 2024-06-06 DIAGNOSIS — Z86.19 HISTORY OF BABESIOSIS: ICD-10-CM

## 2024-06-06 DIAGNOSIS — R53.82 CHRONIC FATIGUE: ICD-10-CM

## 2024-06-06 DIAGNOSIS — C25.9 MALIGNANT NEOPLASM OF PANCREAS, UNSPECIFIED LOCATION OF MALIGNANCY (HCC): ICD-10-CM
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
## 2024-06-06 DIAGNOSIS — I10 ESSENTIAL HYPERTENSION: ICD-10-CM

## 2024-06-06 PROCEDURE — G2211 COMPLEX E/M VISIT ADD ON: HCPCS | Performed by: STUDENT IN AN ORGANIZED HEALTH CARE EDUCATION/TRAINING PROGRAM

## 2024-06-06 PROCEDURE — 99214 OFFICE O/P EST MOD 30 MIN: CPT | Performed by: STUDENT IN AN ORGANIZED HEALTH CARE EDUCATION/TRAINING PROGRAM

## 2024-06-06 RX ORDER — DOXYCYCLINE HYCLATE 100 MG
100 TABLET ORAL 2 TIMES DAILY
Qty: 14 TABLET | Refills: 0 | Status: SHIPPED | OUTPATIENT
Start: 2024-06-06 | End: 2024-06-13

## 2024-06-06 NOTE — PROGRESS NOTES
Clinic Visit Note  Kevin Schmitz 78 y.o. male   MRN: [id number]    Assessment and Plan      Diagnoses and all orders for this visit:    Tick bite, unspecified site, initial encounter  Chronic fatigue, similar symptoms previous history of babesiosis with splenectomy, recommend blood panel tomorrow morning, after getting blood panel start 7 days of doxycycline until blood work review, will start patient on prophylactic antibiotic given symptoms and severe babesiosis requiring hospitalization previously.  Patient in agreement plan.  -     doxycycline hyclate (VIBRA-TABS) 100 mg tablet; Take 1 tablet (100 mg total) by mouth 2 (two) times a day for 7 days    Chronic fatigue  -     Peripheral Smear; Future  -     Lyme Total AB W Reflex to IGM/IGG; Future  -     Babesia microti antibody, IgG & Igm; Future  -     Anaplasma Phagocytophilum, PCR; Future  -     CBC and differential; Future  -     Comprehensive metabolic panel; Future  -     Sedimentation rate, automated; Future  -     C-reactive protein; Future    History of babesiosis  -     Peripheral Smear; Future  -     Lyme Total AB W Reflex to IGM/IGG; Future  -     Babesia microti antibody, IgG & Igm; Future  -     Anaplasma Phagocytophilum, PCR; Future  -     CBC and differential; Future  -     Comprehensive metabolic panel; Future  -     Sedimentation rate, automated; Future  -     C-reactive protein; Future    Essential hypertension  Blood pressure appropriate in office today    Malignant neoplasm of pancreas, unspecified location of malignancy (HCC)  Follows with [facility], imaging reviewed, no recurrence    Hypercholesterolemia  Continue statin therapy    History of DVT of lower extremity    My impressions and treatment recommendations were discussed in detail with the patient who verbalized understanding and had no further questions.  Discharge instructions were provided.    Subjective     Chief Complaint: Acute care visit    History of Present  Illness:    Patient is a pleasant 78-year-old male coming in for acute care visit, history of babesiosis, patient concerning for chronic fatigue, having similar symptoms again.    The following portions of the patient's history were reviewed and updated as appropriate: allergies, current medications, past family history, past medical history, past social history, past surgical history and problem list.    REVIEW OF SYSTEMS:  A complete 12-point review of systems is negative other than that noted in the HPI.    Review of Systems   Constitutional:  Positive for fatigue. Negative for chills and fever.   HENT:  Negative for congestion and sore throat.    Eyes:  Negative for pain and visual disturbance.   Respiratory:  Negative for shortness of breath and wheezing.    Cardiovascular:  Negative for chest pain and palpitations.   Gastrointestinal:  Negative for abdominal pain, constipation, diarrhea, nausea and vomiting.   Genitourinary:  Negative for dysuria and frequency.   Musculoskeletal:  Negative for back pain and neck pain.   Skin:  Negative for color change and rash.   Neurological:  Negative for dizziness and headaches.   Psychiatric/Behavioral:  Negative for agitation and confusion.    All other systems reviewed and are negative.        Current Outpatient Medications:   •  alfuzosin (UROXATRAL) 10 mg 24 hr tablet, Take 10 mg by mouth daily., Disp: , Rfl:   •  amLODIPine (NORVASC) 10 mg tablet, TAKE 1 TABLET BY MOUTH DAILY, Disp: 90 tablet, Rfl: 3  •  Ascorbic Acid (vitamin C) 1000 MG tablet, Take 1,000 mg by mouth daily, Disp: , Rfl:   •  aspirin 81 mg chewable tablet, Chew 81 mg daily  , Disp: , Rfl:   •  carvedilol (COREG) 6.25 mg tablet, TAKE 2 TABLETS BY MOUTH TWICE  DAILY, Disp: 240 tablet, Rfl: 5  •  cholecalciferol (VITAMIN D3) 1,000 units tablet, Take 5,000 Units by mouth daily, Disp: , Rfl:   •  Diclofenac Sodium (VOLTAREN) 1 %, Apply 2 g topically 2 (two) times a day, Disp: 100 g, Rfl: 0  •  doxycycline  hyclate (VIBRA-TABS) 100 mg tablet, Take 1 tablet (100 mg total) by mouth 2 (two) times a day for 7 days, Disp: 14 tablet, Rfl: 0  •  rosuvastatin (CRESTOR) 5 mg tablet, TAKE 1 TABLET BY MOUTH DAILY, Disp: 90 tablet, Rfl: 3  •  spironolactone-hydrochlorothiazide (ALDACTAZIDE) 25-25 mg per tablet, TAKE ONE-HALF TABLET BY MOUTH  DAILY, Disp: 135 tablet, Rfl: 1  Past Medical History:   Diagnosis Date   • BEP (benign enlargement of prostate)    • Cancer (HCC)     pancreatic   whipple procedure  basal cell   • Cardiac murmur    • Colon polyp    • Hyperlipidemia    • Hypertension    • Positive colorectal cancer screening using Cologuard test 01/2021   • PVC (premature ventricular contraction) 1990's    hx of, most recent stress test 2018 - normal results per pt   • Right bundle branch block      Past Surgical History:   Procedure Laterality Date   • APPENDECTOMY     • COLONOSCOPY     • HERNIA REPAIR      right inguinal x2 and left inguinal x1   • PANCREAS SURGERY     • SPLENECTOMY, TOTAL     • WHIPPLE PROCEDURE/PANCREATICO-DUODENECTOMY       Social History     Socioeconomic History   • Marital status: /Civil Union     Spouse name: Not on file   • Number of children: Not on file   • Years of education: Not on file   • Highest education level: Not on file   Occupational History   • Not on file   Tobacco Use   • Smoking status: Never   • Smokeless tobacco: Never   Vaping Use   • Vaping status: Never Used   Substance and Sexual Activity   • Alcohol use: Yes     Alcohol/week: 10.0 standard drinks of alcohol     Types: 10 Cans of beer per week     Comment: 5-6 daily   • Drug use: No   • Sexual activity: Not on file   Other Topics Concern   • Not on file   Social History Narrative   • Not on file     Social Determinants of Health     Financial Resource Strain: Low Risk  (9/12/2023)    Overall Financial Resource Strain (CARDIA)    • Difficulty of Paying Living Expenses: Not very hard   Food Insecurity: Unknown (11/5/2021)     Received from Health system    Hunger Vital Sign    • Worried About Running Out of Food in the Last Year: Never true    • Ran Out of Food in the Last Year: Not on file   Transportation Needs: No Transportation Needs (9/12/2023)    PRAPARE - Transportation    • Lack of Transportation (Medical): No    • Lack of Transportation (Non-Medical): No   Physical Activity: Not on file   Stress: No Stress Concern Present (6/21/2021)    Received from Health system    [employer] - Occupational Stress Questionnaire    • Feeling of Stress : Not at all   Social Connections: Not on file   Intimate Partner Violence: Not on file   Housing Stability: Low Risk  (4/22/2024)    [facility]    Housing Stability    • Unstable Housing in the Last Year: Not on file     Family History   Problem Relation Age of Onset   • Hypertension Mother    • Hypertension Father    • Cancer Father    • Cancer Daughter      No Known Allergies    Objective     Vitals:    06/06/24 1536   BP: 140/78   BP Location: Left arm   Patient Position: Sitting   Cuff Size: Standard   Pulse: 72   Resp: 16   Temp: 97.8 °F (36.6 °C)   TempSrc: Temporal   SpO2: 97%   Weight: 78.9 kg (174 lb)   Height: 6' (1.829 m)       Physical Exam:     GENERAL: NAD, pleasant   HEENT:  NC/AT, PERRL, EOMI, no scleral icterus  CARDIAC:  RRR, +S1/S2, no S3/S4 appreciated, no m/g/r  PULMONARY:  CTA B/L, no wheezing/rales/rhonci, non-labored breathing  ABDOMEN:  Soft, NT/ND, no rebound/guarding/rigidity  Extremities:. No edema, cyanosis, or clubbing  Musculoskeletal:  Full range of motion intact in all extremities   NEUROLOGIC: Grossly intact, no focal deficits  SKIN:  No rashes or erythema noted on exposed skin  Psych: Normal affect, mood stable    ==  PLEASE NOTE:  This encounter was completed utilizing the [employer]Flu[employer] Direct Speech Voice Recognition Software. Grammatical errors, random word insertions,  pronoun errors and incomplete sentences are occasional consequences of the system due to software limitations, ambient noise and hardware issues.These may be missed by proof reading prior to affixing electronic signature. Any questions or concerns about the content, text or information contained within the body of this dictation should be directly addressed to the physician for clarification. Please do not hesitate to call me directly if you have any any questions or concerns.    Kashmir Oleary, DO  [facility] Internal Medicine   [facility]

## 2024-06-07 ENCOUNTER — APPOINTMENT (OUTPATIENT)
Dept: LAB | Facility: CLINIC | Age: 79
End: 2024-06-07
Payer: COMMERCIAL

## 2024-06-07 DIAGNOSIS — Z86.19 HISTORY OF BABESIOSIS: ICD-10-CM

## 2024-06-07 DIAGNOSIS — R53.82 CHRONIC FATIGUE: ICD-10-CM

## 2024-06-07 LAB
ALBUMIN SERPL BCP-MCNC: 4.6 G/DL (ref 3.5–5)
ALP SERPL-CCNC: 74 U/L (ref 34–104)
ALT SERPL W P-5'-P-CCNC: 23 U/L (ref 7–52)
ANION GAP SERPL CALCULATED.3IONS-SCNC: 8 MMOL/L (ref 4–13)
AST SERPL W P-5'-P-CCNC: 20 U/L (ref 13–39)
B BURGDOR IGG SERPL QL IA: POSITIVE
B BURGDOR IGG+IGM SER QL IA: POSITIVE
B BURGDOR IGM SERPL QL IA: POSITIVE
BASOPHILS # BLD AUTO: 0.1 THOUSAND/UL (ref 0–0.1)
BASOPHILS NFR MAR MANUAL: 2 % (ref 0–1)
BILIRUB SERPL-MCNC: 0.95 MG/DL (ref 0.2–1)
BUN SERPL-MCNC: 13 MG/DL (ref 5–25)
CALCIUM SERPL-MCNC: 9.6 MG/DL (ref 8.4–10.2)
CHLORIDE SERPL-SCNC: 94 MMOL/L (ref 96–108)
CO2 SERPL-SCNC: 30 MMOL/L (ref 21–32)
CREAT SERPL-MCNC: 0.8 MG/DL (ref 0.6–1.3)
CRP SERPL QL: <1 MG/L
EOSINOPHIL # BLD AUTO: 0.05 THOUSAND/UL (ref 0–0.61)
EOSINOPHIL NFR BLD MANUAL: 1 % (ref 0–6)
ERYTHROCYTE [DISTWIDTH] IN BLOOD BY AUTOMATED COUNT: 12.6 % (ref 11.6–15.1)
ERYTHROCYTE [SEDIMENTATION RATE] IN BLOOD: 22 MM/HOUR (ref 0–19)
GFR SERPL CREATININE-BSD FRML MDRD: 86 ML/MIN/1.73SQ M
GLUCOSE P FAST SERPL-MCNC: 119 MG/DL (ref 65–99)
HCT VFR BLD AUTO: 47.5 % (ref 36.5–49.3)
HGB BLD-MCNC: 16.4 G/DL (ref 12–17)
LYMPHOCYTES # BLD AUTO: 2.16 THOUSAND/UL (ref 0.6–4.47)
LYMPHOCYTES # BLD AUTO: 42 %
MCH RBC QN AUTO: 31.4 PG (ref 26.8–34.3)
MCHC RBC AUTO-ENTMCNC: 34.5 G/DL (ref 31.4–37.4)
MCV RBC AUTO: 91 FL (ref 82–98)
MONOCYTES # BLD AUTO: 1.08 THOUSAND/UL (ref 0–1.22)
MONOCYTES NFR BLD AUTO: 21 % (ref 4–12)
NEUTS SEG # BLD: 1.75 THOUSAND/UL (ref 1.81–6.82)
NEUTS SEG NFR BLD AUTO: 34 %
NRBC BLD AUTO-RTO: 0 /100 WBCS
PLATELET # BLD AUTO: 293 THOUSANDS/UL (ref 149–390)
PLATELET BLD QL SMEAR: ADEQUATE
PMV BLD AUTO: 10 FL (ref 8.9–12.7)
POIKILOCYTOSIS BLD QL SMEAR: PRESENT
POTASSIUM SERPL-SCNC: 4.7 MMOL/L (ref 3.5–5.3)
PROT SERPL-MCNC: 7.6 G/DL (ref 6.4–8.4)
RBC # BLD AUTO: 5.22 MILLION/UL (ref 3.88–5.62)
SODIUM SERPL-SCNC: 132 MMOL/L (ref 135–147)
TOTAL CELLS COUNTED SPEC: 100
WBC # BLD AUTO: 5.15 THOUSAND/UL (ref 4.31–10.16)

## 2024-06-07 PROCEDURE — 86753 PROTOZOA ANTIBODY NOS: CPT

## 2024-06-07 PROCEDURE — 85652 RBC SED RATE AUTOMATED: CPT

## 2024-06-07 PROCEDURE — 85007 BL SMEAR W/DIFF WBC COUNT: CPT

## 2024-06-07 PROCEDURE — 86140 C-REACTIVE PROTEIN: CPT

## 2024-06-07 PROCEDURE — 86618 LYME DISEASE ANTIBODY: CPT

## 2024-06-07 PROCEDURE — 87468 ANAPLSMA PHGCYTOPHLM AMP PRB: CPT

## 2024-06-07 PROCEDURE — 80053 COMPREHEN METABOLIC PANEL: CPT

## 2024-06-07 PROCEDURE — 36415 COLL VENOUS BLD VENIPUNCTURE: CPT

## 2024-06-07 PROCEDURE — 86617 LYME DISEASE ANTIBODY: CPT

## 2024-06-11 ENCOUNTER — TELEPHONE (OUTPATIENT)
Age: 79
End: 2024-06-11
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
## 2024-06-11 LAB
B MICROTI IGG TITR SER: ABNORMAL {TITER}
B MICROTI IGM TITR SER: ABNORMAL {TITER}
RESULT/COMMENT: ABNORMAL

## 2024-06-11 RX ORDER — DOXYCYCLINE HYCLATE 100 MG
100 TABLET ORAL 2 TIMES DAILY
Qty: 28 TABLET | Refills: 0 | Status: SHIPPED | OUTPATIENT
Start: 2024-06-11 | End: 2024-06-25

## 2024-06-11 NOTE — TELEPHONE ENCOUNTER
Positive Lyme IgG and IgM, Lyme's treatment x 21 days, monitor for symptomatic improvement.          Patient Communication     Edit Comments   Add Notifications  Back to Top    Patient wife informed . Wife in communication consent.  Milagros Bird

## 2024-06-12 LAB — A PHAGOCYTOPH DNA BLD QL NAA+PROBE: NEGATIVE

## 2024-07-10 ENCOUNTER — APPOINTMENT (EMERGENCY)
Dept: RADIOLOGY | Facility: HOSPITAL | Age: 79
End: 2024-07-10
Payer: COMMERCIAL

## 2024-07-10 ENCOUNTER — HOSPITAL ENCOUNTER (EMERGENCY)
Facility: HOSPITAL | Age: 79
Discharge: HOME | End: 2024-07-10
Attending: EMERGENCY MEDICINE
Payer: COMMERCIAL

## 2024-07-10 VITALS
WEIGHT: 178 LBS | HEART RATE: 59 BPM | RESPIRATION RATE: 17 BRPM | BODY MASS INDEX: 24.14 KG/M2 | TEMPERATURE: 97.6 F | DIASTOLIC BLOOD PRESSURE: 66 MMHG | OXYGEN SATURATION: 96 % | SYSTOLIC BLOOD PRESSURE: 128 MMHG

## 2024-07-10 DIAGNOSIS — R00.2 PALPITATIONS: Primary | ICD-10-CM

## 2024-07-10 LAB
ALBUMIN SERPL BCG-MCNC: 4.2 G/DL (ref 3.5–5)
ALP SERPL-CCNC: 63 U/L (ref 34–104)
ALT SERPL W P-5'-P-CCNC: 29 U/L (ref 7–52)
ANION GAP SERPL CALCULATED.3IONS-SCNC: 5 MMOL/L (ref 4–13)
APTT PPP: 26 SECONDS (ref 23–37)
AST SERPL W P-5'-P-CCNC: 19 U/L (ref 13–39)
BASOPHILS # BLD AUTO: 0.07 THOUSANDS/ÂΜL (ref 0–0.1)
BASOPHILS NFR BLD AUTO: 1 % (ref 0–1)
BILIRUB SERPL-MCNC: 1.01 MG/DL (ref 0.2–1)
BNP SERPL-MCNC: 119 PG/ML (ref 0–100)
BUN SERPL-MCNC: 11 MG/DL (ref 5–25)
CALCIUM SERPL-MCNC: 9.1 MG/DL (ref 8.4–10.2)
CARDIAC TROPONIN I PNL SERPL HS: 3 NG/L
CHLORIDE SERPL-SCNC: 105 MMOL/L (ref 96–108)
CO2 SERPL-SCNC: 27 MMOL/L (ref 21–32)
CREAT SERPL-MCNC: 0.61 MG/DL (ref 0.6–1.3)
D DIMER PPP FEU-MCNC: 0.69 UG/ML FEU
EOSINOPHIL # BLD AUTO: 0.19 THOUSAND/ÂΜL (ref 0–0.61)
EOSINOPHIL NFR BLD AUTO: 4 % (ref 0–6)
ERYTHROCYTE [DISTWIDTH] IN BLOOD BY AUTOMATED COUNT: 13.9 % (ref 11.6–15.1)
GFR SERPL CREATININE-BSD FRML MDRD: 97 ML/MIN/1.73SQ M
GLUCOSE SERPL-MCNC: 123 MG/DL (ref 65–140)
HCT VFR BLD AUTO: 44 % (ref 36.5–49.3)
HGB BLD-MCNC: 15 G/DL (ref 12–17)
IMM GRANULOCYTES # BLD AUTO: 0.01 THOUSAND/UL (ref 0–0.2)
IMM GRANULOCYTES NFR BLD AUTO: 0 % (ref 0–2)
INR PPP: 1 (ref 0.84–1.19)
LYMPHOCYTES # BLD AUTO: 1.22 THOUSANDS/ÂΜL (ref 0.6–4.47)
LYMPHOCYTES NFR BLD AUTO: 23 % (ref 14–44)
MCH RBC QN AUTO: 31.4 PG (ref 26.8–34.3)
MCHC RBC AUTO-ENTMCNC: 34.1 G/DL (ref 31.4–37.4)
MCV RBC AUTO: 92 FL (ref 82–98)
MONOCYTES # BLD AUTO: 0.76 THOUSAND/ÂΜL (ref 0.17–1.22)
MONOCYTES NFR BLD AUTO: 14 % (ref 4–12)
NEUTROPHILS # BLD AUTO: 3.13 THOUSANDS/ÂΜL (ref 1.85–7.62)
NEUTS SEG NFR BLD AUTO: 58 % (ref 43–75)
NRBC BLD AUTO-RTO: 0 /100 WBCS
PLATELET # BLD AUTO: 278 THOUSANDS/UL (ref 149–390)
PMV BLD AUTO: 9.7 FL (ref 8.9–12.7)
POTASSIUM SERPL-SCNC: 4 MMOL/L (ref 3.5–5.3)
PROT SERPL-MCNC: 6.9 G/DL (ref 6.4–8.4)
PROTHROMBIN TIME: 13.4 SECONDS (ref 11.6–14.5)
RBC # BLD AUTO: 4.78 MILLION/UL (ref 3.88–5.62)
SODIUM SERPL-SCNC: 137 MMOL/L (ref 135–147)
WBC # BLD AUTO: 5.38 THOUSAND/UL (ref 4.31–10.16)

## 2024-07-10 PROCEDURE — 36415 COLL VENOUS BLD VENIPUNCTURE: CPT | Performed by: EMERGENCY MEDICINE

## 2024-07-10 PROCEDURE — 80053 COMPREHEN METABOLIC PANEL: CPT | Performed by: EMERGENCY MEDICINE

## 2024-07-10 PROCEDURE — 84484 ASSAY OF TROPONIN QUANT: CPT | Performed by: EMERGENCY MEDICINE

## 2024-07-10 PROCEDURE — 93005 ELECTROCARDIOGRAM TRACING: CPT

## 2024-07-10 PROCEDURE — 71275 CT ANGIOGRAPHY CHEST: CPT

## 2024-07-10 PROCEDURE — 85730 THROMBOPLASTIN TIME PARTIAL: CPT | Performed by: EMERGENCY MEDICINE

## 2024-07-10 PROCEDURE — 93971 EXTREMITY STUDY: CPT | Performed by: SURGERY

## 2024-07-10 PROCEDURE — 85025 COMPLETE CBC W/AUTO DIFF WBC: CPT | Performed by: EMERGENCY MEDICINE

## 2024-07-10 PROCEDURE — 93971 EXTREMITY STUDY: CPT

## 2024-07-10 PROCEDURE — 85379 FIBRIN DEGRADATION QUANT: CPT | Performed by: EMERGENCY MEDICINE

## 2024-07-10 PROCEDURE — 83880 ASSAY OF NATRIURETIC PEPTIDE: CPT | Performed by: EMERGENCY MEDICINE

## 2024-07-10 PROCEDURE — 85610 PROTHROMBIN TIME: CPT | Performed by: EMERGENCY MEDICINE

## 2024-07-10 PROCEDURE — 99284 EMERGENCY DEPT VISIT MOD MDM: CPT | Performed by: EMERGENCY MEDICINE

## 2024-07-10 PROCEDURE — 71045 X-RAY EXAM CHEST 1 VIEW: CPT

## 2024-07-10 RX ADMIN — IOHEXOL 85 ML: 350 INJECTION, SOLUTION INTRAVENOUS at 12:32

## 2024-07-10 NOTE — ED PROVIDER NOTES
History  Chief Complaint   Patient presents with    Palpitations     Started at 6 am today,  has a smart watch that vibrated and reading was 108 for the HR.  Patient asynptomatic..  Diagnose with a blood clot in the left leg last August.  Was on eliquis but now on aspirin     78-year-old male states at 6 AM this morning is a small watch told him that he had a rapid heart rate 108.  Patient states he did not feel this did not have any shortness of breath chest pain nausea vomiting.  Patient is awake and alert now as well as he was then.  He has a heart rate of 61 here in the ED on arrival.  Show me his smart watch and the tracing had and the tracing did look like some movement.  No other complaints patient states he did have a DVT in his left leg in the past was on Eliquis for 3 months is now off it states he does have some tightness and occasional swelling in that leg over the last week or so.  No other complaints.        Prior to Admission Medications   Prescriptions Last Dose Informant Patient Reported? Taking?   Ascorbic Acid (vitamin C) 1000 MG tablet  Self Yes No   Sig: Take 1,000 mg by mouth daily   Diclofenac Sodium (VOLTAREN) 1 %  Self No No   Sig: Apply 2 g topically 2 (two) times a day   alfuzosin (UROXATRAL) 10 mg 24 hr tablet  Self Yes No   Sig: Take 10 mg by mouth daily.   amLODIPine (NORVASC) 10 mg tablet   No No   Sig: TAKE 1 TABLET BY MOUTH DAILY   aspirin 81 mg chewable tablet  Self Yes No   Sig: Chew 81 mg daily     carvedilol (COREG) 6.25 mg tablet   No No   Sig: TAKE 2 TABLETS BY MOUTH TWICE  DAILY   cholecalciferol (VITAMIN D3) 1,000 units tablet  Self Yes No   Sig: Take 5,000 Units by mouth daily   rosuvastatin (CRESTOR) 5 mg tablet   No No   Sig: TAKE 1 TABLET BY MOUTH DAILY   spironolactone-hydrochlorothiazide (ALDACTAZIDE) 25-25 mg per tablet   No No   Sig: TAKE ONE-HALF TABLET BY MOUTH  DAILY      Facility-Administered Medications: None       Past Medical History:   Diagnosis Date    BE  (benign enlargement of prostate)     Cancer (HCC)     pancreatic   whipple procedure  basal cell    Cardiac murmur     Colon polyp     Hyperlipidemia     Hypertension     Positive colorectal cancer screening using Cologuard test 01/2021    PVC (premature ventricular contraction) 1990's    hx of, most recent stress test 2018 - normal results per pt    Right bundle branch block        Past Surgical History:   Procedure Laterality Date    APPENDECTOMY      COLONOSCOPY      HERNIA REPAIR      right inguinal x2 and left inguinal x1    PANCREAS SURGERY      SPLENECTOMY, TOTAL      WHIPPLE PROCEDURE/PANCREATICO-DUODENECTOMY         Family History   Problem Relation Age of Onset    Hypertension Mother     Hypertension Father     Cancer Father     Cancer Daughter      I have reviewed and agree with the history as documented.    E-Cigarette/Vaping    E-Cigarette Use Never User      E-Cigarette/Vaping Substances    Nicotine No     THC No     CBD No     Flavoring No     Other No     Unknown No      Social History     Tobacco Use    Smoking status: Never    Smokeless tobacco: Never   Vaping Use    Vaping status: Never Used   Substance Use Topics    Alcohol use: Yes     Alcohol/week: 10.0 standard drinks of alcohol     Types: 10 Cans of beer per week     Comment: 5-6 daily    Drug use: No       Review of Systems   Constitutional:  Negative for activity change, chills, diaphoresis and fever.   HENT:  Negative for congestion, ear pain, nosebleeds, sore throat, trouble swallowing and voice change.    Eyes:  Negative for pain, discharge and redness.   Respiratory:  Negative for apnea, cough, choking, shortness of breath, wheezing and stridor.    Cardiovascular:  Positive for palpitations and leg swelling. Negative for chest pain.   Gastrointestinal:  Negative for abdominal distention, abdominal pain, constipation, diarrhea, nausea and vomiting.   Endocrine: Negative for polydipsia.   Genitourinary:  Negative for difficulty urinating,  dysuria, flank pain, frequency, hematuria and urgency.   Musculoskeletal:  Negative for back pain, gait problem, joint swelling, myalgias, neck pain and neck stiffness.   Skin:  Negative for pallor and rash.   Neurological:  Negative for dizziness, tremors, syncope, speech difficulty, weakness, numbness and headaches.   Hematological:  Negative for adenopathy.   Psychiatric/Behavioral:  Negative for confusion, hallucinations, self-injury and suicidal ideas. The patient is not nervous/anxious.        Physical Exam  Physical Exam  Vitals and nursing note reviewed.   Constitutional:       General: He is not in acute distress.     Appearance: He is well-developed. He is not diaphoretic.   HENT:      Head: Normocephalic and atraumatic.      Right Ear: External ear normal.      Left Ear: External ear normal.      Nose: Nose normal.   Eyes:      Conjunctiva/sclera: Conjunctivae normal.      Pupils: Pupils are equal, round, and reactive to light.   Cardiovascular:      Rate and Rhythm: Normal rate and regular rhythm.      Heart sounds: Normal heart sounds.   Pulmonary:      Effort: Pulmonary effort is normal.      Breath sounds: Normal breath sounds.   Abdominal:      General: Bowel sounds are normal.      Palpations: Abdomen is soft.      Tenderness: There is no abdominal tenderness.   Musculoskeletal:         General: Normal range of motion.      Cervical back: Normal range of motion and neck supple.   Skin:     General: Skin is warm and dry.   Neurological:      Mental Status: He is alert and oriented to person, place, and time.      Deep Tendon Reflexes: Reflexes are normal and symmetric.   Psychiatric:         Behavior: Behavior is cooperative.         Vital Signs  ED Triage Vitals   Temperature Pulse Respirations Blood Pressure SpO2   07/10/24 1017 07/10/24 1017 07/10/24 1017 07/10/24 1017 07/10/24 1017   97.6 °F (36.4 °C) 61 18 138/86 97 %      Temp src Heart Rate Source Patient Position - Orthostatic VS BP Location  FiO2 (%)   -- 07/10/24 1017 07/10/24 1255 07/10/24 1255 --    Monitor Sitting Left arm       Pain Score       07/10/24 1255       No Pain           Vitals:    07/10/24 1017 07/10/24 1255   BP: 138/86 128/66   Pulse: 61 59   Patient Position - Orthostatic VS:  Sitting         Visual Acuity      ED Medications  Medications   iohexol (OMNIPAQUE) 350 MG/ML injection (MULTI-DOSE) 85 mL (85 mL Intravenous Given 7/10/24 1232)       Diagnostic Studies  Results Reviewed       Procedure Component Value Units Date/Time    D-Dimer [[id number]]  (Abnormal) Collected: 07/10/24 1055    Lab Status: Final result Specimen: Blood from Arm, Right Updated: 07/10/24 1201     D-Dimer, Quant 0.69 ug/ml FEU     Narrative:      In the evaluation for possible pulmonary embolism, in the appropriate (Well's Score of 4 or less) patient, the age adjusted d-dimer cutoff for this patient can be calculated as:    Age x 0.01 (in ug/mL) for Age-adjusted D-dimer exclusion threshold for a patient over 50 years.    Protime-INR [[id number]]  (Normal) Collected: 07/10/24 1055    Lab Status: Final result Specimen: Blood from Arm, Right Updated: 07/10/24 1200     Protime 13.4 seconds      INR 1.00    APTT [[id number]]  (Normal) Collected: 07/10/24 1055    Lab Status: Final result Specimen: Blood from Arm, Right Updated: 07/10/24 1200     PTT 26 seconds     HS Troponin 0hr (reflex protocol) [[id number]]  (Normal) Collected: 07/10/24 1055    Lab Status: Final result Specimen: Blood from Arm, Right Updated: 07/10/24 1125     hs TnI 0hr 3 ng/L     B-Type Natriuretic Peptide(BNP) [[id number]]  (Abnormal) Collected: 07/10/24 1055    Lab Status: Final result Specimen: Blood from Arm, Right Updated: 07/10/24 1125      pg/mL     Comprehensive metabolic panel [[id number]]  (Abnormal) Collected: 07/10/24 1055    Lab Status: Final result Specimen: Blood from Arm, Right Updated: 07/10/24 1123     Sodium 137 mmol/L      Potassium 4.0 mmol/L      Chloride 105 mmol/L       CO2 27 mmol/L      ANION GAP 5 mmol/L      BUN 11 mg/dL      Creatinine 0.61 mg/dL      Glucose 123 mg/dL      Calcium 9.1 mg/dL      AST 19 U/L      ALT 29 U/L      Alkaline Phosphatase 63 U/L      Total Protein 6.9 g/dL      Albumin 4.2 g/dL      Total Bilirubin 1.01 mg/dL      eGFR 97 ml/min/1.73sq m     Narrative:      National Kidney Disease Foundation guidelines for Chronic Kidney Disease (CKD):     Stage 1 with normal or high GFR (GFR > 90 mL/min/1.73 square meters)    Stage 2 Mild CKD (GFR = 60-89 mL/min/1.73 square meters)    Stage 3A Moderate CKD (GFR = 45-59 mL/min/1.73 square meters)    Stage 3B Moderate CKD (GFR = 30-44 mL/min/1.73 square meters)    Stage 4 Severe CKD (GFR = 15-29 mL/min/1.73 square meters)    Stage 5 End Stage CKD (GFR <15 mL/min/1.73 square meters)  Note: GFR calculation is accurate only with a steady state creatinine    CBC and differential [[id number]]  (Abnormal) Collected: 07/10/24 1055    Lab Status: Final result Specimen: Blood from Arm, Right Updated: 07/10/24 1105     WBC 5.38 Thousand/uL      RBC 4.78 Million/uL      Hemoglobin 15.0 g/dL      Hematocrit 44.0 %      MCV 92 fL      MCH 31.4 pg      MCHC 34.1 g/dL      RDW 13.9 %      MPV 9.7 fL      Platelets 278 Thousands/uL      nRBC 0 /100 WBCs      Segmented % 58 %      Immature Grans % 0 %      Lymphocytes % 23 %      Monocytes % 14 %      Eosinophils Relative 4 %      Basophils Relative 1 %      Absolute Neutrophils 3.13 Thousands/µL      Absolute Immature Grans 0.01 Thousand/uL      Absolute Lymphocytes 1.22 Thousands/µL      Absolute Monocytes 0.76 Thousand/µL      Eosinophils Absolute 0.19 Thousand/µL      Basophils Absolute 0.07 Thousands/µL                    CTA ED chest PE study   Final Result by Atif Avila MD (07/10 1341)      No evidence of pulmonary embolus.      Pronounced interval enlargement of the visualized pancreatic head is noted now measuring 4.5 x 4.0 cm. Contrast and noncontrast enhanced MRI of  the abdomen recommended for further characterization to exclude underlying mass lesion.      Pronounced interval enlargement of the left hepatic lobe cyst.      Cholelithiasis.      Workstation performed: [id number]         VAS lower limb venous duplex study, unilateral/limited    (Results Pending)   XR chest 1 view portable    (Results Pending)              Procedures  Procedures         ED Course                                 SBIRT 20yo+      Flowsheet Row Most Recent Value   Initial Alcohol Screen: US AUDIT-C     1. How often do you have a drink containing alcohol? 6 Filed at: 07/10/2024 1020   2. How many drinks containing alcohol do you have on a typical day you are drinking?  1 Filed at: 07/10/2024 1020   3a. Male UNDER 65: How often do you have five or more drinks on one occasion? 0 Filed at: 07/10/2024 1020   Audit-C Score 7 Filed at: 07/10/2024 1020   Full Alcohol Screen: US AUDIT    4. How often during the last year have you found that you were not able to stop drinking once you had started? 0 Filed at: 07/10/2024 1020   5. How often during past year have you failed to do what was normally expected of you because of drinking?  0 Filed at: 07/10/2024 1020   6. How often in past year have you needed a first drink in the morning to get yourself going after a heavy drinking session?  0 Filed at: 07/10/2024 1020   7. How often in past year have you had feeling of guilt or remorse after drinking?  0 Filed at: 07/10/2024 1020   8. How often in past year have you been unable to remember what happened night before because you had been drinking?  0 Filed at: 07/10/2024 1020   9. Have you or someone else been injured as a result of your drinking?  0 Filed at: 07/10/2024 1020   10. Has a relative, friend, doctor or other health worker been concerned about your drinking and suggested you cut down?  0 Filed at: 07/10/2024 1020   AUDIT Total Score 7 Filed at: 07/10/2024 1020   RUTHIE: How many times in the past year  have you...    Used an illegal drug or used a prescription medication for non-medical reasons? Never Filed at: 07/10/2024 1020                      Medical Decision Making  Amount and/or Complexity of Data Reviewed  Labs: ordered.  Radiology: ordered.    Risk  Prescription drug management.                 Disposition  Final diagnoses:   Palpitations     Time reflects when diagnosis was documented in both MDM as applicable and the Disposition within this note       Time User Action Codes Description Comment    7/10/2024  1:45 PM Kody Holman Add [R00.2] Palpitations           ED Disposition       ED Disposition   Discharge    Condition   Stable    Date/Time   Wed Jul 10, 2024 1345    Comment   Kevin Schmitz discharge to home/self care.                   Follow-up Information       Follow up With Specialties Details Why Contact Info    Kashmir Oleary DO Family Medicine, Internal Medicine Schedule an appointment as soon as possible for a visit  As needed [address] [place] [id number]  [phone]              Patient's Medications   Discharge Prescriptions    No medications on file       No discharge procedures on file.    PDMP Review       None            ED Provider  Electronically Signed by             Kody Holman DO  07/10/24 1656

## 2024-07-10 NOTE — Clinical Note
Kevin Schmitz was seen and treated in our emergency department on 7/10/2024.                Diagnosis:     Kevin  may return to work on return date.    He may return on this date: 07/12/2024         If you have any questions or concerns, please don't hesitate to call.      Kody Holman, DO    ______________________________           _______________          _______________  Hospital Representative                              Date                                Time

## 2024-07-12 LAB
ATRIAL RATE: 57 BPM
P AXIS: 5 DEGREES
PR INTERVAL: 178 MS
QRS AXIS: -28 DEGREES
QRSD INTERVAL: 96 MS
QT INTERVAL: 426 MS
QTC INTERVAL: 414 MS
T WAVE AXIS: 2 DEGREES
VENTRICULAR RATE: 57 BPM

## 2024-07-12 PROCEDURE — 93010 ELECTROCARDIOGRAM REPORT: CPT | Performed by: INTERNAL MEDICINE

## 2024-09-03 ENCOUNTER — NURSE TRIAGE (OUTPATIENT)
Age: 79
End: 2024-09-03

## 2024-09-03 ENCOUNTER — OFFICE VISIT (OUTPATIENT)
Dept: FAMILY MEDICINE CLINIC | Facility: CLINIC | Age: 79
End: 2024-09-03
Payer: COMMERCIAL

## 2024-09-03 VITALS
HEIGHT: 72 IN | BODY MASS INDEX: 23.98 KG/M2 | OXYGEN SATURATION: 98 % | SYSTOLIC BLOOD PRESSURE: 136 MMHG | HEART RATE: 64 BPM | DIASTOLIC BLOOD PRESSURE: 82 MMHG | TEMPERATURE: 97.2 F | WEIGHT: 177 LBS | RESPIRATION RATE: 16 BRPM

## 2024-09-03 DIAGNOSIS — I10 ESSENTIAL HYPERTENSION: Primary | ICD-10-CM

## 2024-09-03 DIAGNOSIS — I82.552 CHRONIC DEEP VEIN THROMBOSIS (DVT) OF LEFT PERONEAL VEIN (HCC): ICD-10-CM

## 2024-09-03 DIAGNOSIS — N13.8 BENIGN PROSTATIC HYPERPLASIA WITH URINARY OBSTRUCTION: ICD-10-CM

## 2024-09-03 DIAGNOSIS — L23.9 ALLERGIC CONTACT DERMATITIS, UNSPECIFIED TRIGGER: ICD-10-CM

## 2024-09-03 DIAGNOSIS — N40.1 BENIGN PROSTATIC HYPERPLASIA WITH URINARY OBSTRUCTION: ICD-10-CM

## 2024-09-03 DIAGNOSIS — K86.89 PANCREATIC MASS: ICD-10-CM

## 2024-09-03 DIAGNOSIS — H61.23 CERUMEN DEBRIS ON TYMPANIC MEMBRANE OF BOTH EARS: ICD-10-CM

## 2024-09-03 PROBLEM — L08.9 LOCAL INFECTION OF SKIN AND SUBCUTANEOUS TISSUE: Status: RESOLVED | Noted: 2023-07-18 | Resolved: 2024-09-03

## 2024-09-03 PROCEDURE — G2211 COMPLEX E/M VISIT ADD ON: HCPCS | Performed by: STUDENT IN AN ORGANIZED HEALTH CARE EDUCATION/TRAINING PROGRAM

## 2024-09-03 PROCEDURE — 3079F DIAST BP 80-89 MM HG: CPT | Performed by: STUDENT IN AN ORGANIZED HEALTH CARE EDUCATION/TRAINING PROGRAM

## 2024-09-03 PROCEDURE — 1160F RVW MEDS BY RX/DR IN RCRD: CPT | Performed by: STUDENT IN AN ORGANIZED HEALTH CARE EDUCATION/TRAINING PROGRAM

## 2024-09-03 PROCEDURE — 3075F SYST BP GE 130 - 139MM HG: CPT | Performed by: STUDENT IN AN ORGANIZED HEALTH CARE EDUCATION/TRAINING PROGRAM

## 2024-09-03 PROCEDURE — 1101F PT FALLS ASSESS-DOCD LE1/YR: CPT | Performed by: STUDENT IN AN ORGANIZED HEALTH CARE EDUCATION/TRAINING PROGRAM

## 2024-09-03 PROCEDURE — 99214 OFFICE O/P EST MOD 30 MIN: CPT | Performed by: STUDENT IN AN ORGANIZED HEALTH CARE EDUCATION/TRAINING PROGRAM

## 2024-09-03 PROCEDURE — 3288F FALL RISK ASSESSMENT DOCD: CPT | Performed by: STUDENT IN AN ORGANIZED HEALTH CARE EDUCATION/TRAINING PROGRAM

## 2024-09-03 PROCEDURE — 1159F MED LIST DOCD IN RCRD: CPT | Performed by: STUDENT IN AN ORGANIZED HEALTH CARE EDUCATION/TRAINING PROGRAM

## 2024-09-03 RX ORDER — TRIAMCINOLONE ACETONIDE 1 MG/G
CREAM TOPICAL 2 TIMES DAILY
Qty: 30 G | Refills: 0 | Status: SHIPPED | OUTPATIENT
Start: 2024-09-03 | End: 2024-09-03

## 2024-09-03 RX ORDER — TRIAMCINOLONE ACETONIDE 1 MG/G
CREAM TOPICAL 2 TIMES DAILY
Qty: 30 G | Refills: 0 | Status: SHIPPED | OUTPATIENT
Start: 2024-09-03

## 2024-09-03 NOTE — PROGRESS NOTES
Clinic Visit Note  Kevin Schmitz 78 y.o. male   MRN: [id number]    Assessment and Plan      Diagnoses and all orders for this visit:    Essential hypertension  Blood pressure stable, continue antihypertensive medication    Chronic deep vein thrombosis (DVT) of left peroneal vein (HCC)  Most recent scan negative, reviewed with patient at time of encounter    Benign prostatic hyperplasia with urinary obstruction  Continue alfuzosin    Pancreatic mass  Follows with [facility], most recent imaging unremarkable, routine follow-up    Allergic contact dermatitis, unspecified trigger  -     triamcinolone (KENALOG) 0.1 % cream; Apply topically 2 (two) times a day    Cerumen debris on tympanic membrane of both ears  Ear lavage with no complications, improvement bilaterally, continue Debrox weekly in the outpatient setting.    My impressions and treatment recommendations were discussed in detail with the patient who verbalized understanding and had no further questions.  Discharge instructions were provided.    Subjective     Chief Complaint: F/U    History of Present Illness:    Patient is a pleasant 78-year-old male coming in for follow-up chronic disease management, bilateral cerumen debris tympanic membrane.    The following portions of the patient's history were reviewed and updated as appropriate: allergies, current medications, past family history, past medical history, past social history, past surgical history and problem list.    REVIEW OF SYSTEMS:  A complete 12-point review of systems is negative other than that noted in the HPI.    Review of Systems   Constitutional:  Negative for chills and fever.   HENT:  Negative for ear pain and sore throat.    Eyes:  Negative for pain and visual disturbance.   Respiratory:  Negative for cough and shortness of breath.    Cardiovascular:  Negative for chest pain and palpitations.   Gastrointestinal:  Negative for abdominal pain and vomiting.   Genitourinary:  Negative  for dysuria and hematuria.   Musculoskeletal:  Negative for arthralgias and back pain.   Skin:  Negative for color change and rash.   Neurological:  Negative for seizures and syncope.   All other systems reviewed and are negative.        Current Outpatient Medications:   •  alfuzosin (UROXATRAL) 10 mg 24 hr tablet, Take 10 mg by mouth daily., Disp: , Rfl:   •  amLODIPine (NORVASC) 10 mg tablet, TAKE 1 TABLET BY MOUTH DAILY, Disp: 90 tablet, Rfl: 3  •  Ascorbic Acid (vitamin C) 1000 MG tablet, Take 1,000 mg by mouth daily, Disp: , Rfl:   •  aspirin 81 mg chewable tablet, Chew 81 mg daily  , Disp: , Rfl:   •  carvedilol (COREG) 6.25 mg tablet, TAKE 2 TABLETS BY MOUTH TWICE  DAILY, Disp: 240 tablet, Rfl: 5  •  cholecalciferol (VITAMIN D3) 1,000 units tablet, Take 5,000 Units by mouth daily, Disp: , Rfl:   •  rosuvastatin (CRESTOR) 5 mg tablet, TAKE 1 TABLET BY MOUTH DAILY, Disp: 90 tablet, Rfl: 3  •  spironolactone-hydrochlorothiazide (ALDACTAZIDE) 25-25 mg per tablet, TAKE ONE-HALF TABLET BY MOUTH  DAILY, Disp: 135 tablet, Rfl: 1  •  triamcinolone (KENALOG) 0.1 % cream, Apply topically 2 (two) times a day, Disp: 30 g, Rfl: 0  Past Medical History:   Diagnosis Date   • BEP (benign enlargement of prostate)    • Cancer (HCC)     pancreatic   whipple procedure  basal cell   • Cardiac murmur    • Colon polyp    • Hyperlipidemia    • Hypertension    • Positive colorectal cancer screening using Cologuard test 01/2021   • PVC (premature ventricular contraction) 1990's    hx of, most recent stress test 2018 - normal results per pt   • Right bundle branch block      Past Surgical History:   Procedure Laterality Date   • APPENDECTOMY     • COLONOSCOPY     • HERNIA REPAIR      right inguinal x2 and left inguinal x1   • PANCREAS SURGERY     • SPLENECTOMY, TOTAL     • WHIPPLE PROCEDURE/PANCREATICO-DUODENECTOMY       Social History     Socioeconomic History   • Marital status: /Civil Union     Spouse name: Not on file   •  Number of children: Not on file   • Years of education: Not on file   • Highest education level: Not on file   Occupational History   • Not on file   Tobacco Use   • Smoking status: Never   • Smokeless tobacco: Never   Vaping Use   • Vaping status: Never Used   Substance and Sexual Activity   • Alcohol use: Yes     Alcohol/week: 10.0 standard drinks of alcohol     Types: 10 Cans of beer per week     Comment: 5-6 daily   • Drug use: No   • Sexual activity: Not on file   Other Topics Concern   • Not on file   Social History Narrative   • Not on file     Social Determinants of Health     Financial Resource Strain: Low Risk  (9/12/2023)    Overall Financial Resource Strain (CARDIA)    • Difficulty of Paying Living Expenses: Not very hard   Food Insecurity: Unknown (11/5/2021)    [facility]    Hunger Vital Sign    • Worried About Running Out of Food in the Last Year: Never true    • Ran Out of Food in the Last Year: Not on file   Transportation Needs: No Transportation Needs (9/12/2023)    PRAPARE - Transportation    • Lack of Transportation (Medical): No    • Lack of Transportation (Non-Medical): No   Physical Activity: Not on file   Stress: No Stress Concern Present (6/21/2021)    Received from [facility]    [employer] - Occupational Stress Questionnaire    • Feeling of Stress : Not at all   Social Connections: Not on file   Intimate Partner Violence: Not on file   Housing Stability: Low Risk  (4/22/2024)    Received from [facility]    Housing Stability    • Unstable Housing in the Last Year: Not on file     Family History   Problem Relation Age of Onset   • Hypertension Mother    • Hypertension Father    • Cancer Father    • Cancer Daughter      No Known Allergies    Objective     Vitals:    09/03/24 1148   BP: 136/82   BP Location: Left arm   Patient Position: Sitting   Cuff Size: Large   Pulse:  64   Resp: 16   Temp: (!) 97.2 °F (36.2 °C)   SpO2: 98%   Weight: 80.3 kg (177 lb)   Height: 6' (1.829 m)       Physical Exam:     GENERAL: NAD, pleasant   HEENT:  NC/AT, PERRL, EOMI, no scleral icterus  CARDIAC:  RRR, +S1/S2, no S3/S4 appreciated, no m/g/r  PULMONARY:  CTA B/L, no wheezing/rales/rhonci, non-labored breathing  ABDOMEN:  Soft, NT/ND, no rebound/guarding/rigidity  Extremities:. No edema, cyanosis, or clubbing  Musculoskeletal:  Full range of motion intact in all extremities   NEUROLOGIC: Grossly intact, no focal deficits  SKIN:  No rashes or erythema noted on exposed skin  Psych: Normal affect, mood stable    ==  PLEASE NOTE:  This encounter was completed utilizing the [employer] Direct Speech Voice Recognition Software. Grammatical errors, random word insertions, pronoun errors and incomplete sentences are occasional consequences of the system due to software limitations, ambient noise and hardware issues.These may be missed by proof reading prior to affixing electronic signature. Any questions or concerns about the content, text or information contained within the body of this dictation should be directly addressed to the physician for clarification. Please do not hesitate to call me directly if you have any any questions or concerns.    Kashmir Oleary DO  [facility]   [facility]

## 2024-09-03 NOTE — TELEPHONE ENCOUNTER
"Patient called in for appointment for clogged ear and mentioned leg pain.  Patient has appointment on 9/5.  Left leg started \"aching\" on and off for the last couple of weeks.  He states his has gotten a little worse as of today.  Patient states he has a history of a DVT in this leg and it has not felt quite the same since.  He states the pain is intermittent and sometimes is on the top of the thigh and sometimes the back.  Radiates to the ankle and top of the foot.  Denies edema, redness or warmth.  Notes bilateral ankles are discolored, but this is chronic for him.  Denies numbness or tingling.  Denies SOB, CP or fever.  Patient takes low dose ASA daily.    Should patient have out patient US?  Please advise and call patient back.        Reason for Disposition   MODERATE pain (e.g., interferes with normal activities, limping) and present > 3 days    Answer Assessment - Initial Assessment Questions  1. ONSET: \"When did the pain start?\"       On and off for weeks and worse this week.   2. LOCATION: \"Where is the pain located?\"       Left thigh down to the ankle and on the top of the foot.  Thigh pain is sometimes in the front and back.   3. PAIN: \"How bad is the pain?\"    (Scale 1-10; or mild, moderate, severe)    -  MILD (1-3): doesn't interfere with normal activities     -  MODERATE (4-7): interferes with normal activities (e.g., work or school) or awakens from sleep, limping     -  SEVERE (8-10): excruciating pain, unable to do any normal activities, unable to walk      2/10  4. WORK OR EXERCISE: \"Has there been any recent work or exercise that involved this part of the body?\"       Denies  5. CAUSE: \"What do you think is causing the leg pain?\"      Unknown  6. OTHER SYMPTOMS: \"Do you have any other symptoms?\" (e.g., chest pain, back pain, breathing difficulty, swelling, rash, fever, numbness, weakness)      Denies  7. PREGNANCY: \"Is there any chance you are pregnant?\" \"When was your last menstrual period?\"      " N/A    Protocols used: Leg Pain-ADULT-[place]

## 2024-09-21 ENCOUNTER — HOSPITAL ENCOUNTER (EMERGENCY)
Facility: HOSPITAL | Age: 79
Discharge: HOME | End: 2024-09-21
Attending: EMERGENCY MEDICINE
Payer: COMMERCIAL

## 2024-09-21 ENCOUNTER — APPOINTMENT (EMERGENCY)
Dept: RADIOLOGY | Facility: HOSPITAL | Age: 79
End: 2024-09-21
Payer: COMMERCIAL

## 2024-09-21 VITALS
OXYGEN SATURATION: 97 % | DIASTOLIC BLOOD PRESSURE: 74 MMHG | RESPIRATION RATE: 18 BRPM | WEIGHT: 176.4 LBS | HEIGHT: 72 IN | TEMPERATURE: 97.3 F | SYSTOLIC BLOOD PRESSURE: 143 MMHG | BODY MASS INDEX: 23.89 KG/M2 | HEART RATE: 57 BPM

## 2024-09-21 DIAGNOSIS — R00.2 PALPITATIONS: Primary | ICD-10-CM

## 2024-09-21 LAB
ALBUMIN SERPL BCG-MCNC: 4.5 G/DL (ref 3.5–5)
ALP SERPL-CCNC: 76 U/L (ref 34–104)
ALT SERPL W P-5'-P-CCNC: 25 U/L (ref 7–52)
ANION GAP SERPL CALCULATED.3IONS-SCNC: 5 MMOL/L (ref 4–13)
APTT PPP: 25 SECONDS (ref 23–34)
AST SERPL W P-5'-P-CCNC: 21 U/L (ref 13–39)
BASOPHILS # BLD AUTO: 0.06 THOUSANDS/ΜL (ref 0–0.1)
BASOPHILS NFR BLD AUTO: 1 % (ref 0–1)
BILIRUB SERPL-MCNC: 0.92 MG/DL (ref 0.2–1)
BUN SERPL-MCNC: 15 MG/DL (ref 5–25)
CALCIUM SERPL-MCNC: 9.1 MG/DL (ref 8.4–10.2)
CARDIAC TROPONIN I PNL SERPL HS: 4 NG/L
CHLORIDE SERPL-SCNC: 102 MMOL/L (ref 96–108)
CO2 SERPL-SCNC: 28 MMOL/L (ref 21–32)
CREAT SERPL-MCNC: 0.71 MG/DL (ref 0.6–1.3)
EOSINOPHIL # BLD AUTO: 0.19 THOUSAND/ΜL (ref 0–0.61)
EOSINOPHIL NFR BLD AUTO: 4 % (ref 0–6)
ERYTHROCYTE [DISTWIDTH] IN BLOOD BY AUTOMATED COUNT: 13 % (ref 11.6–15.1)
GFR SERPL CREATININE-BSD FRML MDRD: 90 ML/MIN/1.73SQ M
GLUCOSE SERPL-MCNC: 109 MG/DL (ref 65–140)
HCT VFR BLD AUTO: 47.1 % (ref 36.5–49.3)
HGB BLD-MCNC: 15.9 G/DL (ref 12–17)
IMM GRANULOCYTES # BLD AUTO: 0.01 THOUSAND/UL (ref 0–0.2)
IMM GRANULOCYTES NFR BLD AUTO: 0 % (ref 0–2)
INR PPP: 0.93 (ref 0.85–1.19)
LYMPHOCYTES # BLD AUTO: 1.53 THOUSANDS/ΜL (ref 0.6–4.47)
LYMPHOCYTES NFR BLD AUTO: 29 % (ref 14–44)
MCH RBC QN AUTO: 31.6 PG (ref 26.8–34.3)
MCHC RBC AUTO-ENTMCNC: 33.8 G/DL (ref 31.4–37.4)
MCV RBC AUTO: 94 FL (ref 82–98)
MONOCYTES # BLD AUTO: 0.89 THOUSAND/ΜL (ref 0.17–1.22)
MONOCYTES NFR BLD AUTO: 17 % (ref 4–12)
NEUTROPHILS # BLD AUTO: 2.63 THOUSANDS/ΜL (ref 1.85–7.62)
NEUTS SEG NFR BLD AUTO: 49 % (ref 43–75)
NRBC BLD AUTO-RTO: 0 /100 WBCS
PLATELET # BLD AUTO: 291 THOUSANDS/UL (ref 149–390)
PMV BLD AUTO: 9.5 FL (ref 8.9–12.7)
POTASSIUM SERPL-SCNC: 3.9 MMOL/L (ref 3.5–5.3)
PROT SERPL-MCNC: 7.4 G/DL (ref 6.4–8.4)
PROTHROMBIN TIME: 13 SECONDS (ref 12.3–15)
RBC # BLD AUTO: 5.03 MILLION/UL (ref 3.88–5.62)
SODIUM SERPL-SCNC: 135 MMOL/L (ref 135–147)
TSH SERPL DL<=0.05 MIU/L-ACNC: 1.69 UIU/ML (ref 0.45–4.5)
WBC # BLD AUTO: 5.31 THOUSAND/UL (ref 4.31–10.16)

## 2024-09-21 PROCEDURE — 85610 PROTHROMBIN TIME: CPT | Performed by: EMERGENCY MEDICINE

## 2024-09-21 PROCEDURE — 85730 THROMBOPLASTIN TIME PARTIAL: CPT | Performed by: EMERGENCY MEDICINE

## 2024-09-21 PROCEDURE — 36415 COLL VENOUS BLD VENIPUNCTURE: CPT | Performed by: EMERGENCY MEDICINE

## 2024-09-21 PROCEDURE — 80053 COMPREHEN METABOLIC PANEL: CPT | Performed by: EMERGENCY MEDICINE

## 2024-09-21 PROCEDURE — 99285 EMERGENCY DEPT VISIT HI MDM: CPT

## 2024-09-21 PROCEDURE — 99284 EMERGENCY DEPT VISIT MOD MDM: CPT | Performed by: EMERGENCY MEDICINE

## 2024-09-21 PROCEDURE — 71045 X-RAY EXAM CHEST 1 VIEW: CPT

## 2024-09-21 PROCEDURE — 85025 COMPLETE CBC W/AUTO DIFF WBC: CPT | Performed by: EMERGENCY MEDICINE

## 2024-09-21 PROCEDURE — 84484 ASSAY OF TROPONIN QUANT: CPT | Performed by: EMERGENCY MEDICINE

## 2024-09-21 PROCEDURE — 93005 ELECTROCARDIOGRAM TRACING: CPT

## 2024-09-21 PROCEDURE — 84443 ASSAY THYROID STIM HORMONE: CPT | Performed by: EMERGENCY MEDICINE

## 2024-09-21 NOTE — ED PROVIDER NOTES
1. Palpitations      ED Disposition       ED Disposition   Discharge    Condition   Stable    Date/Time   Sat Sep 21, 2024 12:21 PM    Comment   Kevin Schmitz discharge to home/self care.                   Assessment & Plan       Medical Decision Making  Patient has history of palpitations though he states this 1 felt different.  Will check cardiac and metabolic profiles    Amount and/or Complexity of Data Reviewed  Labs: ordered.  Radiology: ordered.                     Medications - No data to display    History of Present Illness       Patient states has been in his normal health recently and woke up well this morning.  Sometime after having coffee he felt what he describes as a skipped beat and his heart.  Patient states his smart watch, which picked up tachycardia on his last episode, did not  any changes.  He had no shortness of breath nausea diaphoresis.  Patient gets occasional skipped beats since that time including just before the EKG on arrival.  He denies any recent fever, other health changes or changes in medication.        Review of Systems   Constitutional:  Negative for chills, diaphoresis, fatigue and fever.   HENT:  Negative for congestion and sore throat.    Eyes:  Negative for visual disturbance.   Respiratory:  Negative for cough and shortness of breath.    Cardiovascular:  Positive for palpitations. Negative for chest pain and leg swelling.   Gastrointestinal:  Negative for abdominal pain and nausea.   Genitourinary:  Negative for decreased urine volume.   Musculoskeletal:  Negative for back pain.   Skin:  Negative for color change and rash.   Neurological:  Negative for dizziness and light-headedness.   Hematological:  Does not bruise/bleed easily.   Psychiatric/Behavioral:  Negative for confusion.    All other systems reviewed and are negative.          Objective     ED Triage Vitals [09/21/24 1028]   Temperature Pulse Blood Pressure Respirations SpO2 Patient Position -  Orthostatic VS   (!) 97.3 °F (36.3 °C) (S) 64 162/73 18 98 % Sitting      Temp Source Heart Rate Source BP Location FiO2 (%) Pain Score    Tympanic Monitor Right arm -- No Pain        Physical Exam  Vitals and nursing note reviewed.   Constitutional:       Appearance: Normal appearance.   HENT:      Head: Normocephalic.      Right Ear: External ear normal.      Left Ear: External ear normal.      Nose: Nose normal.      Mouth/Throat:      Pharynx: Oropharynx is clear.   Eyes:      Conjunctiva/sclera: Conjunctivae normal.   Cardiovascular:      Rate and Rhythm: Normal rate and regular rhythm.      Pulses: Normal pulses.      Heart sounds: Normal heart sounds.   Pulmonary:      Effort: Pulmonary effort is normal.      Breath sounds: Normal breath sounds.   Abdominal:      General: Bowel sounds are normal.      Palpations: Abdomen is soft.      Tenderness: There is no abdominal tenderness.   Musculoskeletal:         General: Normal range of motion.      Cervical back: Normal range of motion.      Right lower leg: No edema.      Left lower leg: No edema.   Skin:     General: Skin is warm and dry.      Capillary Refill: Capillary refill takes less than 2 seconds.   Neurological:      General: No focal deficit present.      Mental Status: He is alert and oriented to person, place, and time.   Psychiatric:         Mood and Affect: Mood normal.         Behavior: Behavior normal.         Labs Reviewed   CBC AND DIFFERENTIAL - Abnormal       Result Value    WBC 5.31      RBC 5.03      Hemoglobin 15.9      Hematocrit 47.1      MCV 94      MCH 31.6      MCHC 33.8      RDW 13.0      MPV 9.5      Platelets 291      nRBC 0      Segmented % 49      Immature Grans % 0      Lymphocytes % 29      Monocytes % 17 (*)     Eosinophils Relative 4      Basophils Relative 1      Absolute Neutrophils 2.63      Absolute Immature Grans 0.01      Absolute Lymphocytes 1.53      Absolute Monocytes 0.89      Eosinophils Absolute 0.19      Basophils  Absolute 0.06     PROTIME-INR - Normal    Protime 13.0      INR 0.93      Narrative:     INR Therapeutic Range    Indication                                             INR Range      Atrial Fibrillation                                               2.0-3.0  Hypercoagulable State                                    2.0.2.3  Left Ventricular Asist Device                            2.0-3.0  Mechanical Heart Valve                                  -    Aortic(with afib, MI, embolism, HF, LA enlargement,    and/or coagulopathy)                                     2.0-3.0 (2.5-3.5)     Mitral                                                             2.5-3.5  Prosthetic/Bioprosthetic Heart Valve               2.0-3.0  Venous thromboembolism (VTE: VT, PE        2.0-3.0   APTT - Normal    PTT 25     HS TROPONIN I 0HR - Normal    hs TnI 0hr 4     TSH, 3RD GENERATION WITH FREE T4 REFLEX - Normal    TSH 3RD GENERATON 1.685     COMPREHENSIVE METABOLIC PANEL    Sodium 135      Potassium 3.9      Chloride 102      CO2 28      ANION GAP 5      BUN 15      Creatinine 0.71      Glucose 109      Calcium 9.1      AST 21      ALT 25      Alkaline Phosphatase 76      Total Protein 7.4      Albumin 4.5      Total Bilirubin 0.92      eGFR 90      Narrative:     National Kidney Disease Foundation guidelines for Chronic Kidney Disease (CKD):     Stage 1 with normal or high GFR (GFR > 90 mL/min/1.73 square meters)    Stage 2 Mild CKD (GFR = 60-89 mL/min/1.73 square meters)    Stage 3A Moderate CKD (GFR = 45-59 mL/min/1.73 square meters)    Stage 3B Moderate CKD (GFR = 30-44 mL/min/1.73 square meters)    Stage 4 Severe CKD (GFR = 15-29 mL/min/1.73 square meters)    Stage 5 End Stage CKD (GFR <15 mL/min/1.73 square meters)  Note: GFR calculation is accurate only with a steady state creatinine   HS TROPONIN I 2HR   HS TROPONIN I 4HR     XR chest 1 view portable    (Results Pending)       ECG 12 Lead Documentation Only    Date/Time: 9/21/2024  10:36 AM    Performed by: Rachid Romano MD  Authorized by: Rachid Romano MD    Indications / Diagnosis:  Palpitation  ECG reviewed by me, the ED Provider: yes    Patient location:  ED  Interpretation:     Interpretation: abnormal    Rate:     ECG rate:  64    ECG rate assessment: normal    Rhythm:     Rhythm: sinus rhythm    Ectopy:     Ectopy: PVCs      PVCs:  Infrequent  QRS:     QRS axis:  Left    QRS intervals:  Normal  Conduction:     Conduction: normal    ST segments:     ST segments:  Normal  T waves:     T waves: normal        ED Medication and Procedure Management   Prior to Admission Medications   Prescriptions Last Dose Informant Patient Reported? Taking?   Ascorbic Acid (vitamin C) 1000 MG tablet  Self Yes No   Sig: Take 1,000 mg by mouth daily   alfuzosin (UROXATRAL) 10 mg 24 hr tablet  Self Yes No   Sig: Take 10 mg by mouth daily.   amLODIPine (NORVASC) 10 mg tablet   No No   Sig: TAKE 1 TABLET BY MOUTH DAILY   aspirin 81 mg chewable tablet  Self Yes No   Sig: Chew 81 mg daily     carvedilol (COREG) 6.25 mg tablet   No No   Sig: TAKE 2 TABLETS BY MOUTH TWICE  DAILY   cholecalciferol (VITAMIN D3) 1,000 units tablet  Self Yes No   Sig: Take 5,000 Units by mouth daily   rosuvastatin (CRESTOR) 5 mg tablet   No No   Sig: TAKE 1 TABLET BY MOUTH DAILY   spironolactone-hydrochlorothiazide (ALDACTAZIDE) 25-25 mg per tablet   No No   Sig: TAKE ONE-HALF TABLET BY MOUTH  DAILY   triamcinolone (KENALOG) 0.1 % cream   No No   Sig: Apply topically 2 (two) times a day      Facility-Administered Medications: None     Patient's Medications   Discharge Prescriptions    No medications on file     No discharge procedures on file.     Rachid Romano MD  09/21/24 [id number]

## 2024-09-22 LAB
ATRIAL RATE: 64 BPM
P AXIS: 16 DEGREES
PR INTERVAL: 178 MS
QRS AXIS: -32 DEGREES
QRSD INTERVAL: 94 MS
QT INTERVAL: 414 MS
QTC INTERVAL: 427 MS
T WAVE AXIS: 18 DEGREES
VENTRICULAR RATE: 64 BPM

## 2024-09-22 PROCEDURE — 93010 ELECTROCARDIOGRAM REPORT: CPT | Performed by: INTERNAL MEDICINE

## 2024-10-24 ENCOUNTER — APPOINTMENT (EMERGENCY)
Dept: RADIOLOGY | Facility: HOSPITAL | Age: 79
End: 2024-10-24
Payer: COMMERCIAL

## 2024-10-24 ENCOUNTER — HOSPITAL ENCOUNTER (EMERGENCY)
Facility: HOSPITAL | Age: 79
Discharge: HOME | End: 2024-10-24
Attending: STUDENT IN AN ORGANIZED HEALTH CARE EDUCATION/TRAINING PROGRAM | Admitting: STUDENT IN AN ORGANIZED HEALTH CARE EDUCATION/TRAINING PROGRAM
Payer: COMMERCIAL

## 2024-10-24 VITALS
OXYGEN SATURATION: 97 % | TEMPERATURE: 97.7 F | DIASTOLIC BLOOD PRESSURE: 67 MMHG | HEART RATE: 65 BPM | SYSTOLIC BLOOD PRESSURE: 132 MMHG | RESPIRATION RATE: 18 BRPM

## 2024-10-24 DIAGNOSIS — R07.9 CHEST PAIN: Primary | ICD-10-CM

## 2024-10-24 LAB
2HR DELTA HS TROPONIN: 0 NG/L
ALBUMIN SERPL BCG-MCNC: 4.4 G/DL (ref 3.5–5)
ALP SERPL-CCNC: 65 U/L (ref 34–104)
ALT SERPL W P-5'-P-CCNC: 23 U/L (ref 7–52)
ANION GAP SERPL CALCULATED.3IONS-SCNC: 6 MMOL/L (ref 4–13)
APTT PPP: 26 SECONDS (ref 23–34)
AST SERPL W P-5'-P-CCNC: 22 U/L (ref 13–39)
BASOPHILS # BLD AUTO: 0.09 THOUSANDS/ΜL (ref 0–0.1)
BASOPHILS NFR BLD AUTO: 2 % (ref 0–1)
BILIRUB SERPL-MCNC: 0.48 MG/DL (ref 0.2–1)
BUN SERPL-MCNC: 16 MG/DL (ref 5–25)
CALCIUM SERPL-MCNC: 8.7 MG/DL (ref 8.4–10.2)
CARDIAC TROPONIN I PNL SERPL HS: 4 NG/L
CARDIAC TROPONIN I PNL SERPL HS: 4 NG/L
CHLORIDE SERPL-SCNC: 105 MMOL/L (ref 96–108)
CO2 SERPL-SCNC: 26 MMOL/L (ref 21–32)
CREAT SERPL-MCNC: 0.7 MG/DL (ref 0.6–1.3)
D DIMER PPP FEU-MCNC: 0.42 UG/ML FEU
EOSINOPHIL # BLD AUTO: 0.19 THOUSAND/ΜL (ref 0–0.61)
EOSINOPHIL NFR BLD AUTO: 5 % (ref 0–6)
ERYTHROCYTE [DISTWIDTH] IN BLOOD BY AUTOMATED COUNT: 13 % (ref 11.6–15.1)
GFR SERPL CREATININE-BSD FRML MDRD: 91 ML/MIN/1.73SQ M
GLUCOSE SERPL-MCNC: 129 MG/DL (ref 65–140)
HCT VFR BLD AUTO: 44.7 % (ref 36.5–49.3)
HGB BLD-MCNC: 15.3 G/DL (ref 12–17)
IMM GRANULOCYTES # BLD AUTO: 0.01 THOUSAND/UL (ref 0–0.2)
IMM GRANULOCYTES NFR BLD AUTO: 0 % (ref 0–2)
INR PPP: 0.93 (ref 0.85–1.19)
LYMPHOCYTES # BLD AUTO: 1.14 THOUSANDS/ΜL (ref 0.6–4.47)
LYMPHOCYTES NFR BLD AUTO: 27 % (ref 14–44)
MCH RBC QN AUTO: 31.7 PG (ref 26.8–34.3)
MCHC RBC AUTO-ENTMCNC: 34.2 G/DL (ref 31.4–37.4)
MCV RBC AUTO: 93 FL (ref 82–98)
MONOCYTES # BLD AUTO: 0.64 THOUSAND/ΜL (ref 0.17–1.22)
MONOCYTES NFR BLD AUTO: 15 % (ref 4–12)
NEUTROPHILS # BLD AUTO: 2.13 THOUSANDS/ΜL (ref 1.85–7.62)
NEUTS SEG NFR BLD AUTO: 51 % (ref 43–75)
NRBC BLD AUTO-RTO: 0 /100 WBCS
PLATELET # BLD AUTO: 275 THOUSANDS/UL (ref 149–390)
PMV BLD AUTO: 9.7 FL (ref 8.9–12.7)
POTASSIUM SERPL-SCNC: 3.9 MMOL/L (ref 3.5–5.3)
PROT SERPL-MCNC: 7.1 G/DL (ref 6.4–8.4)
PROTHROMBIN TIME: 12.9 SECONDS (ref 12.3–15)
RBC # BLD AUTO: 4.82 MILLION/UL (ref 3.88–5.62)
SODIUM SERPL-SCNC: 137 MMOL/L (ref 135–147)
WBC # BLD AUTO: 4.2 THOUSAND/UL (ref 4.31–10.16)

## 2024-10-24 PROCEDURE — 99285 EMERGENCY DEPT VISIT HI MDM: CPT | Performed by: STUDENT IN AN ORGANIZED HEALTH CARE EDUCATION/TRAINING PROGRAM

## 2024-10-24 PROCEDURE — 96374 THER/PROPH/DIAG INJ IV PUSH: CPT

## 2024-10-24 PROCEDURE — 80053 COMPREHEN METABOLIC PANEL: CPT | Performed by: STUDENT IN AN ORGANIZED HEALTH CARE EDUCATION/TRAINING PROGRAM

## 2024-10-24 PROCEDURE — 85610 PROTHROMBIN TIME: CPT | Performed by: STUDENT IN AN ORGANIZED HEALTH CARE EDUCATION/TRAINING PROGRAM

## 2024-10-24 PROCEDURE — 99285 EMERGENCY DEPT VISIT HI MDM: CPT

## 2024-10-24 PROCEDURE — 85730 THROMBOPLASTIN TIME PARTIAL: CPT | Performed by: STUDENT IN AN ORGANIZED HEALTH CARE EDUCATION/TRAINING PROGRAM

## 2024-10-24 PROCEDURE — 36415 COLL VENOUS BLD VENIPUNCTURE: CPT

## 2024-10-24 PROCEDURE — 85025 COMPLETE CBC W/AUTO DIFF WBC: CPT | Performed by: STUDENT IN AN ORGANIZED HEALTH CARE EDUCATION/TRAINING PROGRAM

## 2024-10-24 PROCEDURE — 85379 FIBRIN DEGRADATION QUANT: CPT | Performed by: STUDENT IN AN ORGANIZED HEALTH CARE EDUCATION/TRAINING PROGRAM

## 2024-10-24 PROCEDURE — 93005 ELECTROCARDIOGRAM TRACING: CPT

## 2024-10-24 PROCEDURE — 84484 ASSAY OF TROPONIN QUANT: CPT | Performed by: STUDENT IN AN ORGANIZED HEALTH CARE EDUCATION/TRAINING PROGRAM

## 2024-10-24 PROCEDURE — 71045 X-RAY EXAM CHEST 1 VIEW: CPT

## 2024-10-24 RX ORDER — MAGNESIUM HYDROXIDE/ALUMINUM HYDROXICE/SIMETHICONE 120; 1200; 1200 MG/30ML; MG/30ML; MG/30ML
30 SUSPENSION ORAL ONCE
Status: COMPLETED | OUTPATIENT
Start: 2024-10-24 | End: 2024-10-24

## 2024-10-24 RX ORDER — KETOROLAC TROMETHAMINE 30 MG/ML
15 INJECTION, SOLUTION INTRAMUSCULAR; INTRAVENOUS ONCE
Status: COMPLETED | OUTPATIENT
Start: 2024-10-24 | End: 2024-10-24

## 2024-10-24 RX ADMIN — KETOROLAC TROMETHAMINE 15 MG: 30 INJECTION, SOLUTION INTRAMUSCULAR at 09:32

## 2024-10-24 RX ADMIN — ALUMINUM HYDROXIDE, MAGNESIUM HYDROXIDE, AND DIMETHICONE: 200; 20; 200 SUSPENSION ORAL at 09:32

## 2024-10-24 NOTE — DISCHARGE INSTRUCTIONS
"You were evaluated in the Emergency Department today for chest pain. Your evaluation has shown no signs of medical conditions requiring emergent intervention at this time.    Please follow up with your primary care physician as soon as possible. If you do not have a primary doctor, you can call \"[facility]\" to schedule an appointment with one.     It is also recommended you follow up with a cardiologist. Please call to schedule an appointment.     Return to the Emergency Department if you experience worsening or uncontrolled chest pain, shortness of breath, light headedness, feeling faint, nausea, vomiting, or any other concerning symptoms.  "

## 2024-10-25 LAB
ATRIAL RATE: 70 BPM
P AXIS: 23 DEGREES
PR INTERVAL: 168 MS
QRS AXIS: -16 DEGREES
QRSD INTERVAL: 92 MS
QT INTERVAL: 394 MS
QTC INTERVAL: 425 MS
T WAVE AXIS: 39 DEGREES
VENTRICULAR RATE: 70 BPM

## 2024-10-25 PROCEDURE — 93010 ELECTROCARDIOGRAM REPORT: CPT | Performed by: INTERNAL MEDICINE

## 2024-10-27 NOTE — PROGRESS NOTES
Cardiology  ED follow Up   Office Visit Note  Kevin Schmitz   79 y.o.   male   MRN: [id number]  [facility] [place]  [address]  [address]  [place] [id number] [phone]    PCP: Kashmir Oleary DO  Cardiologist: Dr. PAULINO Noland          Summary of Plan:  Heart healthy diet: Mediterranean or DASH.  Education provided  Fasting lipid profile  Nuclear stress test.  CV risk factors: Age, hypertension, dyslipidemia  2-week Zio patch regarding recurrent palpitations.  Several recent ED admissions  With Dr. Noland in December  Colon Ca screenin/10/2022   Last Cologuard: Not on file           Assessment/Plan  Palpitations  Zio patch -1 episodes of short atrial run of 17 beats with a heart rate 175 bpm, otherwise no significant tachycardia arrhythmia.   2024 carvedilol was increased to 12.5 mg every 12, from 9.375 twice daily  Essential hypertension.  /75   on amlodipine 10 mg daily, carvedilol 6.25 mg twice daily, Aldactazide 25/25 daily  Hyperlipidemia.  On rosuvastatin 5 mg daily.  10/14/2022: Calculated LDL 87.  Due for repeat  ASCVD risk score 30.4%  Hx Left  lower extremity DVT.  Now off anticoagulation  LLE (peroneal)  DVT 2023:  2 weeks after dropping a wooden board on the leg and developing cellulitis.  He was started on Eliquis by his PCP.  Eval by vascular surgery -recommended compression stockings to prevent a post thrombotic syndrome.  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 Was told to follow-up within 10 years.  Family history: Colon CA.   Cardiac testing  EST 2022.  [place] protocol for 7 minutes achieving 107 bpm, 73% MPHR and 10.1 METS of activity.  Test was terminated secondary leg pain.  Stress EKG was equivocal for  ischemia.  EKG was not diagnostic due to submaximal stress.  Right bundle branch block was seen.  Zio patch 2023 Patient had a min HR of 49 bpm, max HR of 176 bpm, and avg HR of 68 bpm. Predominant underlying rhythm was Sinus Rhythm. 25 Supraventricular Tachycardia runs occurred, the run with the fastest interval lasting 17 beats with a max rate of 176 bpm, the longest lasting 16 beats with an avg rate of 105 bpm. 1 episodes of short atrial run of 17 beats with a heart rate 175 bpm, otherwise no significant tachycardia arrhythmia.   TTE (OSH 2/27/2024.  EF 55 to 60%.  Normal wall motion.  LAE.  Estimated RVSP 31 mmHg                HPI  Kevin Schmitz is a 79 y.o.year old male with hypertension, palpitations, dyslipidemia incomplete right bundle branch block, and pancreatic cancer status post Whipple procedure at [facility].  He follows in the office with Dr. MALLORIE Small.  He was last seen 5/30/2023 5/30/23 OV Dr PAULINO Small  Per his note:   5/30/2023.  Above reviewed.  Patient came for follow-up he is doing well he has no new complaints.  He is feeling great.  His medical history significant for essential hypertension, dyslipidemia, history of pancreatic mass s/p Whipple procedure and was found to have benign mass.  From cardiac point of view he has no nausea no vomiting no fever no chills no other cardiovascular issues.  EKG today shows heart rate 58 bpm no change from previous EKG.  His blood test in October 2022 was stable Cresta profile and labs were reviewed.  His med list reviewed he continues take his blood pressure medicine along with Crestor.  He is on Coreg 9.375 twice a day, amlodipine 10 mg daily, Crestor and Dyazide half tablet daily.  Blood pressure has been stable since then.    1/2/202024 S Ann PA-C  Increase Coreg to 12.5 mg twice daily  He had been recently diagnosed with lower extremity DVT--advised to continue other cardiac medications including Eliquis    10/28/24  PMH: HTN.  HLD.   Left lower extremity DVT. 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  Advised he could stop the anticoagulation    Since July he has had 3 ED admissions at [facility]--2 for palpitations and 1 for chest pain    He was in the emergency room in July and again in September with palpitations.  His workup was unremarkable  He again was in the emergency room October 24 with chest pain.  He had left-sided sharp chest pain.  Troponins were low.  He was advised to follow-up with cardiology.  He is concerned because he had a history of a blood clot in his leg.  He is accompanied by his wife who adds to the history  left sided CP that awakened him from his sleep.   Is active. Outside working in the yard, stacking wood, etc. does not report symptoms with this type of work.  Occ palpitations.  EKG NSR 68 bpm  Bp 129/75    LB/stable.  No edema.  Given his CV risk factors we will pursue a nuclear stress test.  Will also order a 2-week event monitor given his recurrent palpitations and ED admissions.  Recent TSH was satisfactory in September  Includes exam was unremarkable; I will make no change in his medications.  He will continue on aspirin/ statin/ beta-blocker        Review of Systems   Constitutional: Negative for chills.   Cardiovascular:  Positive for chest pain. Negative for claudication, cyanosis, dyspnea on exertion, irregular heartbeat, leg swelling, near-syncope, orthopnea, palpitations, paroxysmal nocturnal dyspnea and syncope.        See HPI   Respiratory:  Negative for cough and shortness of breath.    Gastrointestinal:  Negative for heartburn and nausea.   Neurological:  Negative for dizziness, focal weakness, headaches, light-headedness and weakness.   All other systems reviewed and are negative.            Assessment  Diagnoses and all orders for this visit:    Essential hypertension  -     POCT ECG  -     NM myocardial perfusion spect  (rx stress and/or rest); Future    Hypercholesterolemia  -     NM myocardial perfusion spect (rx stress and/or rest); Future  -     Lipid panel; Future    Cardiac murmur    Chest pain, unspecified type  -     NM myocardial perfusion spect (rx stress and/or rest); Future    PVC's (premature ventricular contractions)  -     NM myocardial perfusion spect (rx stress and/or rest); Future    Palpitations  -     AMB extended holter monitor; Future        Past Medical History:   Diagnosis Date    BEP (benign enlargement of prostate)     Cancer (HCC)     pancreatic   whipple procedure  basal cell    Cardiac murmur     Colon polyp     Hyperlipidemia     Hypertension     Positive colorectal cancer screening using Cologuard test 01/2021    PVC (premature ventricular contraction) 1990's    hx of, most recent stress test 2018 - normal results per pt    Right bundle branch block        Past Surgical History:   Procedure Laterality Date    APPENDECTOMY      COLONOSCOPY      HERNIA REPAIR      right inguinal x2 and left inguinal x1    PANCREAS SURGERY      SPLENECTOMY, TOTAL      WHIPPLE PROCEDURE/PANCREATICO-DUODENECTOMY             Allergies  No Known Allergies      Medications    Current Outpatient Medications:     alfuzosin (UROXATRAL) 10 mg 24 hr tablet, Take 10 mg by mouth daily., Disp: , Rfl:     amLODIPine (NORVASC) 10 mg tablet, TAKE 1 TABLET BY MOUTH DAILY, Disp: 90 tablet, Rfl: 3    Ascorbic Acid (vitamin C) 1000 MG tablet, Take 1,000 mg by mouth daily, Disp: , Rfl:     aspirin 81 mg chewable tablet, Chew 81 mg daily  , Disp: , Rfl:     carvedilol (COREG) 6.25 mg tablet, TAKE 2 TABLETS BY MOUTH TWICE  DAILY, Disp: 240 tablet, Rfl: 5    cholecalciferol (VITAMIN D3) 1,000 units tablet, Take 5,000 Units by mouth daily, Disp: , Rfl:     rosuvastatin (CRESTOR) 5 mg tablet, TAKE 1 TABLET BY MOUTH DAILY, Disp: 90 tablet, Rfl: 3    spironolactone-hydrochlorothiazide (ALDACTAZIDE) 25-25 mg per tablet, TAKE ONE-HALF TABLET BY MOUTH   DAILY, Disp: 135 tablet, Rfl: 1    triamcinolone (KENALOG) 0.1 % cream, Apply topically 2 (two) times a day (Patient not taking: Reported on 10/28/2024), Disp: 30 g, Rfl: 0      Social History     Socioeconomic History    Marital status: /Civil Union     Spouse name: Not on file    Number of children: Not on file    Years of education: Not on file    Highest education level: Not on file   Occupational History    Not on file   Tobacco Use    Smoking status: Never    Smokeless tobacco: Never   Vaping Use    Vaping status: Never Used   Substance and Sexual Activity    Alcohol use: Yes     Alcohol/week: 10.0 standard drinks of alcohol     Types: 10 Cans of beer per week     Comment: 5-6 daily    Drug use: No    Sexual activity: Not on file   Other Topics Concern    Not on file   Social History Narrative    Not on file     Social Determinants of Health     Financial Resource Strain: Low Risk  (9/12/2023)    Overall Financial Resource Strain (CARDIA)     Difficulty of Paying Living Expenses: Not very hard   Food Insecurity: Unknown (11/5/2021)    Received from [facility]    Hunger Vital Sign     Worried About Running Out of Food in the Last Year: Never true     Ran Out of Food in the Last Year: Not on file   Transportation Needs: No Transportation Needs (9/12/2023)    PRAPARE - Transportation     Lack of Transportation (Medical): No     Lack of Transportation (Non-Medical): No   Physical Activity: Not on file   Stress: No Stress Concern Present (6/21/2021)    [facility]    [employer] - Occupational Stress Questionnaire     Feeling of Stress : Not at all   Social Connections: Not on file   Intimate Partner Violence: Not At Risk (11/26/2023)    [facility]    Interpersonal Violence     Interpersonal Violence: No   Housing Stability: Low Risk  (4/22/2024)    Received from [facility]  "[facility] [place], [facility]    Housing Stability     Unstable Housing in the Last Year: Not on file       Family History   Problem Relation Age of Onset    Hypertension Mother     Hypertension Father     Cancer Father     Cancer Daughter        Physical Exam  Vitals and nursing note reviewed.   Constitutional:       General: He is not in acute distress.  HENT:      Head: Normocephalic and atraumatic.   Eyes:      Extraocular Movements: Extraocular movements intact.      Conjunctiva/sclera: Conjunctivae normal.   Cardiovascular:      Rate and Rhythm: Normal rate and regular rhythm.      Pulses: Intact distal pulses.      Heart sounds: Normal heart sounds.   Pulmonary:      Effort: Pulmonary effort is normal.      Breath sounds: Normal breath sounds.   Abdominal:      General: Bowel sounds are normal.      Palpations: Abdomen is soft.   Musculoskeletal:         General: Normal range of motion.      Cervical back: Normal range of motion and neck supple.   Skin:     General: Skin is warm and dry.   Neurological:      Mental Status: He is alert and oriented to person, place, and time.   Psychiatric:         Mood and Affect: Mood normal.         Vitals: Blood pressure 129/75, pulse 68, weight 80.3 kg (177 lb), SpO2 96%.   Wt Readings from Last 3 Encounters:   10/28/24 80.3 kg (177 lb)   09/21/24 80 kg (176 lb 6.4 oz)   09/03/24 80.3 kg (177 lb)           Labs & Results:  Lab Results   Component Value Date    WBC 4.20 (L) 10/24/2024    HGB 15.3 10/24/2024    HCT 44.7 10/24/2024    MCV 93 10/24/2024     10/24/2024     BNP   Date Value Ref Range Status   07/10/2024 119 (H) 0 - 100 pg/mL Final     No components found for: \"CHEM\"  Troponin I   Date Value Ref Range Status   04/21/2016 0.02 <0.04 ng/mL Final     Results for orders placed during the hospital encounter of 01/12/17    Echo complete with contrast if indicated    Narrative  [address] " [id number]  [phone]    Transthoracic Echocardiogram  2D, M-mode, Doppler, and Color Doppler    Study date:  2017    Patient: MARIETTA EUCEDA  MR number: [id number]  Account number: [id number]  : 1945  Age: 71 years  Gender: Male  Status: Routine  Location: Echo lab  Height: 72 in  Weight: 184.6 lb  BP: 118/ 64 mmHg    Indications: Murmur    Diagnoses: 785.2 - CARDIAC MURMURS NEC    Sonographer:  SUNNY Darby  Primary Physician:  Mynor Martinez DO  Referring Physician:  Andrei Small MD  [facility]  Interpreting Physician:  Alisa Ha DO    SUMMARY    LEFT VENTRICLE:  Systolic function was normal by visual assessment. Ejection fraction was estimated in the range of 55 % to 60 %.  There were no regional wall motion abnormalities.  There was mild concentric hypertrophy.  Doppler parameters were consistent with abnormal left ventricular relaxation (grade 1 diastolic dysfunction).    MITRAL VALVE:  There was trace regurgitation.    AORTIC VALVE:  There was no evidence for stenosis.  There was mild to moderate regurgitation.    TRICUSPID VALVE:  There was mild to moderate regurgitation.  Pulmonary artery systolic pressure was within the normal range.    HISTORY: PRIOR HISTORY: HTN, Hyperlipidemia, Pancreatic Cancer    PROCEDURE: The procedure was performed in the echo lab. This was a routine study. The transthoracic approach was used. The study included complete 2D imaging, M-mode, complete spectral Doppler, and color Doppler. The heart rate was 65 bpm,  at the start of the study. Image quality was adequate.    LEFT VENTRICLE: Size was normal. Systolic function was normal by visual assessment. Ejection fraction was estimated in the range of 55 % to 60 %. There were no regional wall motion abnormalities. There was mild concentric hypertrophy.  DOPPLER: Doppler parameters were consistent with abnormal left ventricular relaxation (grade 1 diastolic dysfunction).    RIGHT VENTRICLE:  The size was normal. Systolic function was normal. DOPPLER: Systolic pressure was within the normal range.    LEFT ATRIUM: The atrium was mildly dilated.    RIGHT ATRIUM: Size was normal.    MITRAL VALVE: Valve structure was normal. There was normal leaflet separation. No echocardiographic evidence for prolapse. DOPPLER: The transmitral velocity was within the normal range. There was no evidence for stenosis. There was trace  regurgitation.    AORTIC VALVE: The valve was trileaflet. Leaflets exhibited normal cuspal separation and sclerosis. DOPPLER: Transaortic velocity was within the normal range. There was no evidence for stenosis. There was mild to moderate regurgitation.    TRICUSPID VALVE: The valve structure was normal. There was normal leaflet separation. DOPPLER: The transtricuspid velocity was within the normal range. There was mild to moderate regurgitation. Pulmonary artery systolic pressure was within  the normal range. Estimated peak PA pressure was 36 mmHg.    PULMONIC VALVE: Leaflets exhibited normal thickness, no calcification, and normal cuspal separation. DOPPLER: The transpulmonic velocity was within the normal range. There was no regurgitation.    PERICARDIUM: There was no thickening. There was no pericardial effusion.    AORTA: The root exhibited normal size.    PULMONARY ARTERY: The size was normal. The morphology appeared normal.    SYSTEM MEASUREMENT TABLES    2D mode  AoR Diam 2D: 3.5 cm  LA Diam (2D): 4.5 cm  LA/Ao (2D): 1.29  FS (2D Teich): 38.4 %  IVSd (2D): 1.04 cm  LVDEV: 84 cm³  LVESV: 26 cm³  LVIDd(2D): 4.32 cm  LVISd (2D): 2.66 cm  LVPWd (2D): 1.12 cm  SV (Teich): 58 cm³    Apical four chamber  LVEF A4C: 55 %    Apical two chamber  LA Area: 20.7 cm squared  LA Volume: 59 cm³    Unspecified Scan Mode  MV Peak A Cy: 970 mm/s  MV Peak E Cy. Mean: 831 mm/s  MVA (PHT): 3.44 cm squared  PHT: 64 ms  Max P mm[Hg]  V Max: 2470 mm/s  Vmax: 2560 mm/s  RA Area: 16.7 cm squared  RA Volume:  42.1 cm³  TAPSE: 2.7 cm    [facility] Accredited Echocardiography Laboratory    Prepared and electronically signed by    Alisa Ha DO  Signed 13-Jan-2017 11:38:55    No results found for this or any previous visit.    No valid procedures specified.  No results found for this or any previous visit.                This note was completed in part utilizing Present direct voice recognition software.   Grammatical errors, random word insertion, spelling mistakes, and incomplete sentences may be an occasional consequence of the system secondary to software limitations, ambient noise and hardware issues. At the time of dictation, efforts were made to edit, clarify and /or correct errors.  Please read the chart carefully and recognize, using context, where substitutions have occurred.  If you have any questions or concerns about the context, text or information contained within the body of this dictation, please contact myself, the provider, for further clarification

## 2024-10-28 ENCOUNTER — OFFICE VISIT (OUTPATIENT)
Dept: CARDIOLOGY CLINIC | Facility: CLINIC | Age: 79
End: 2024-10-28
Payer: COMMERCIAL

## 2024-10-28 VITALS
WEIGHT: 177 LBS | SYSTOLIC BLOOD PRESSURE: 129 MMHG | HEART RATE: 68 BPM | DIASTOLIC BLOOD PRESSURE: 75 MMHG | BODY MASS INDEX: 24.01 KG/M2 | OXYGEN SATURATION: 96 %

## 2024-10-28 DIAGNOSIS — R00.2 PALPITATIONS: ICD-10-CM

## 2024-10-28 DIAGNOSIS — R01.1 CARDIAC MURMUR: ICD-10-CM

## 2024-10-28 DIAGNOSIS — I10 ESSENTIAL HYPERTENSION: Primary | ICD-10-CM

## 2024-10-28 DIAGNOSIS — I49.3 PVC'S (PREMATURE VENTRICULAR CONTRACTIONS): ICD-10-CM

## 2024-10-28 DIAGNOSIS — E78.00 HYPERCHOLESTEROLEMIA: ICD-10-CM

## 2024-10-28 DIAGNOSIS — R07.9 CHEST PAIN, UNSPECIFIED TYPE: ICD-10-CM

## 2024-10-28 PROCEDURE — 99214 OFFICE O/P EST MOD 30 MIN: CPT | Performed by: NURSE PRACTITIONER

## 2024-10-28 PROCEDURE — 93000 ELECTROCARDIOGRAM COMPLETE: CPT | Performed by: NURSE PRACTITIONER

## 2024-10-28 NOTE — LETTER
2024     Kashmir Oleary DO  [address] [facility] [address]    Patient: Kevin Schmitz   YOB: 1945   Date of Visit: 10/28/2024       Dear Dr. Oleary:    Thank you for referring Kevin Schmitz to me for evaluation. Below are my notes for this consultation.    If you have questions, please do not hesitate to call me. I look forward to following your patient along with you.         Sincerely,        DOUGIE Hutchinson        CC: No Recipients    DOUGIE Hutchinson  10/28/2024  3:09 PM  Sign when Signing Visit  Cardiology  ED follow Up   Office Visit Note  Kevin Schmitz   79 y.o.   male   MRN: [id number]  [facility] [place]  [address]  [facility] [address]  [phone]    PCP: Kashmir Oleary DO  Cardiologist: Dr. PAULINO Noland          Summary of Plan:  Heart healthy diet: Mediterranean or DASH.  Education provided  Fasting lipid profile  Nuclear stress test.  CV risk factors: Age, hypertension, dyslipidemia  2-week Zio patch regarding recurrent palpitations.  Several recent ED admissions  With Dr. Noland in December  Colon Ca screenin/10/2022   Last Cologuard: Not on file           Assessment/Plan  Palpitations  Zio patch -1 episodes of short atrial run of 17 beats with a heart rate 175 bpm, otherwise no significant tachycardia arrhythmia.   2024 carvedilol was increased to 12.5 mg every 12, from 9.375 twice daily  Essential hypertension.  BP 1`29/75   on amlodipine 10 mg daily, carvedilol 6.25 mg twice daily, Aldactazide 25/25 daily  Hyperlipidemia.  On rosuvastatin 5 mg daily.  10/14/2022: Calculated LDL 87.  Due for repeat  ASCVD risk score 30.4%  Hx Left  lower extremity DVT.  Now off anticoagulation  LLE (peroneal)  DVT 2023:  2 weeks after dropping a wooden board on the leg and developing cellulitis.  He was started on Eliquis by his PCP.  Eval by vascular surgery -recommended  compression stockings to prevent a post thrombotic syndrome.  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 Was told to follow-up within 10 years.  Family history: Colon CA.   Cardiac testing  EST 5/16/2022.  Troy protocol for 7 minutes achieving 107 bpm, 73% MPHR and 10.1 METS of activity.  Test was terminated secondary leg pain.  Stress EKG was equivocal for ischemia.  EKG was not diagnostic due to submaximal stress.  Right bundle branch block was seen.  Zio patch 2023 Patient had a min HR of 49 bpm, max HR of 176 bpm, and avg HR of 68 bpm. Predominant underlying rhythm was Sinus Rhythm. 25 Supraventricular Tachycardia runs occurred, the run with the fastest interval lasting 17 beats with a max rate of 176 bpm, the longest lasting 16 beats with an avg rate of 105 bpm. 1 episodes of short atrial run of 17 beats with a heart rate 175 bpm, otherwise no significant tachycardia arrhythmia.   TTE (OSH 2/27/2024.  EF 55 to 60%.  Normal wall motion.  LAE.  Estimated RVSP 31 mmHg                HPI  Kevin Schmitz is a 79 y.o.year old male with hypertension, palpitations, dyslipidemia incomplete right bundle branch block, and pancreatic cancer status post Whipple procedure at [facility].  He follows in the office with Dr. MALLORIE Small.  He was last seen 5/30/2023 5/30/23 OV Dr PAULINO Small  Per his note:   5/30/2023.  Above reviewed.  Patient came for follow-up he is doing well he has no new complaints.  He is feeling great.  His medical history significant for essential hypertension, dyslipidemia, history of pancreatic mass s/p Whipple procedure and was found to have benign mass.  From cardiac point of view he has no nausea no vomiting no fever no chills no other cardiovascular issues.  EKG today shows heart  rate 58 bpm no change from previous EKG.  His blood test in October 2022 was stable Cresta profile and labs were reviewed.  His med list reviewed he continues take his blood pressure medicine along with Crestor.  He is on Coreg 9.375 twice a day, amlodipine 10 mg daily, Crestor and Dyazide half tablet daily.  Blood pressure has been stable since then.    1/2/202024 S Ann PA-C  Increase Coreg to 12.5 mg twice daily  He had been recently diagnosed with lower extremity DVT--advised to continue other cardiac medications including Eliquis    10/28/24  PMH: HTN.  HLD.  Left lower extremity DVT. 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  Advised he could stop the anticoagulation    Since July he has had 3 ED admissions at [facility]--2 for palpitations and 1 for chest pain    He was in the emergency room in July and again in September with palpitations.  His workup was unremarkable  He again was in the emergency room October 24 with chest pain.  He had left-sided sharp chest pain.  Troponins were low.  He was advised to follow-up with cardiology.  He is concerned because he had a history of a blood clot in his leg.  He is accompanied by his wife who adds to the history  left sided CP that awakened him from his sleep.   Is active. Outside working in the yard, stacking wood, etc. does not report symptoms with this type of work.  Occ palpitations.  EKG NSR 68 bpm  Bp 129/75    LB/stable.  No edema.  Given his CV risk factors we will pursue a nuclear stress test.  Will also order a 2-week event monitor given his recurrent palpitations and ED admissions.  Recent TSH was satisfactory in September  Includes exam was unremarkable; I will make no change in his medications.  He will continue on aspirin/ statin/ beta-blocker        Review of Systems   Constitutional: Negative for chills.   Cardiovascular:  Positive for chest pain. Negative for  claudication, cyanosis, dyspnea on exertion, irregular heartbeat, leg swelling, near-syncope, orthopnea, palpitations, paroxysmal nocturnal dyspnea and syncope.        See HPI   Respiratory:  Negative for cough and shortness of breath.    Gastrointestinal:  Negative for heartburn and nausea.   Neurological:  Negative for dizziness, focal weakness, headaches, light-headedness and weakness.   All other systems reviewed and are negative.            Assessment  Diagnoses and all orders for this visit:    Essential hypertension  -     POCT ECG  -     NM myocardial perfusion spect (rx stress and/or rest); Future    Hypercholesterolemia  -     NM myocardial perfusion spect (rx stress and/or rest); Future  -     Lipid panel; Future    Cardiac murmur    Chest pain, unspecified type  -     NM myocardial perfusion spect (rx stress and/or rest); Future    PVC's (premature ventricular contractions)  -     NM myocardial perfusion spect (rx stress and/or rest); Future    Palpitations  -     AMB extended holter monitor; Future        Past Medical History:   Diagnosis Date   • BEP (benign enlargement of prostate)    • Cancer (HCC)     pancreatic   whipple procedure  basal cell   • Cardiac murmur    • Colon polyp    • Hyperlipidemia    • Hypertension    • Positive colorectal cancer screening using Cologuard test 01/2021   • PVC (premature ventricular contraction) 1990's    hx of, most recent stress test 2018 - normal results per pt   • Right bundle branch block        Past Surgical History:   Procedure Laterality Date   • APPENDECTOMY     • COLONOSCOPY     • HERNIA REPAIR      right inguinal x2 and left inguinal x1   • PANCREAS SURGERY     • SPLENECTOMY, TOTAL     • WHIPPLE PROCEDURE/PANCREATICO-DUODENECTOMY             Allergies  No Known Allergies      Medications    Current Outpatient Medications:   •  alfuzosin (UROXATRAL) 10 mg 24 hr tablet, Take 10 mg by mouth daily., Disp: , Rfl:   •  amLODIPine (NORVASC) 10 mg tablet, TAKE 1  TABLET BY MOUTH DAILY, Disp: 90 tablet, Rfl: 3  •  Ascorbic Acid (vitamin C) 1000 MG tablet, Take 1,000 mg by mouth daily, Disp: , Rfl:   •  aspirin 81 mg chewable tablet, Chew 81 mg daily  , Disp: , Rfl:   •  carvedilol (COREG) 6.25 mg tablet, TAKE 2 TABLETS BY MOUTH TWICE  DAILY, Disp: 240 tablet, Rfl: 5  •  cholecalciferol (VITAMIN D3) 1,000 units tablet, Take 5,000 Units by mouth daily, Disp: , Rfl:   •  rosuvastatin (CRESTOR) 5 mg tablet, TAKE 1 TABLET BY MOUTH DAILY, Disp: 90 tablet, Rfl: 3  •  spironolactone-hydrochlorothiazide (ALDACTAZIDE) 25-25 mg per tablet, TAKE ONE-HALF TABLET BY MOUTH  DAILY, Disp: 135 tablet, Rfl: 1  •  triamcinolone (KENALOG) 0.1 % cream, Apply topically 2 (two) times a day (Patient not taking: Reported on 10/28/2024), Disp: 30 g, Rfl: 0      Social History     Socioeconomic History   • Marital status: /Civil Union     Spouse name: Not on file   • Number of children: Not on file   • Years of education: Not on file   • Highest education level: Not on file   Occupational History   • Not on file   Tobacco Use   • Smoking status: Never   • Smokeless tobacco: Never   Vaping Use   • Vaping status: Never Used   Substance and Sexual Activity   • Alcohol use: Yes     Alcohol/week: 10.0 standard drinks of alcohol     Types: 10 Cans of beer per week     Comment: 5-6 daily   • Drug use: No   • Sexual activity: Not on file   Other Topics Concern   • Not on file   Social History Narrative   • Not on file     Social Determinants of Health     Financial Resource Strain: Low Risk  (9/12/2023)    Overall Financial Resource Strain (CARDIA)    • Difficulty of Paying Living Expenses: Not very hard   Food Insecurity: Unknown (11/5/2021)    [facility]    Hunger Vital Sign    • Worried About Running Out of Food in the Last Year: Never true    • Ran Out of Food in the Last Year: Not on file   Transportation Needs: No Transportation Needs (9/12/2023)    PRAPARE -  Transportation    • Lack of Transportation (Medical): No    • Lack of Transportation (Non-Medical): No   Physical Activity: Not on file   Stress: No Stress Concern Present (6/21/2021)    Received from [place] - Occupational Stress Questionnaire    • Feeling of Stress : Not at all   Social Connections: Not on file   Intimate Partner Violence: Not At Risk (11/26/2023)    Received from [facility]    Interpersonal Violence    • Interpersonal Violence: No   Housing Stability: Low Risk  (4/22/2024)    Received from [facility]    Housing Stability    • Unstable Housing in the Last Year: Not on file       Family History   Problem Relation Age of Onset   • Hypertension Mother    • Hypertension Father    • Cancer Father    • Cancer Daughter        Physical Exam  Vitals and nursing note reviewed.   Constitutional:       General: He is not in acute distress.  HENT:      Head: Normocephalic and atraumatic.   Eyes:      Extraocular Movements: Extraocular movements intact.      Conjunctiva/sclera: Conjunctivae normal.   Cardiovascular:      Rate and Rhythm: Normal rate and regular rhythm.      Pulses: Intact distal pulses.      Heart sounds: Normal heart sounds.   Pulmonary:      Effort: Pulmonary effort is normal.      Breath sounds: Normal breath sounds.   Abdominal:      General: Bowel sounds are normal.      Palpations: Abdomen is soft.   Musculoskeletal:         General: Normal range of motion.      Cervical back: Normal range of motion and neck supple.   Skin:     General: Skin is warm and dry.   Neurological:      Mental Status: He is alert and oriented to person, place, and time.   Psychiatric:         Mood and Affect: Mood normal.         Vitals: Blood pressure 129/75, pulse 68, weight 80.3 kg (177 lb), SpO2 96%.   Wt Readings from Last 3 Encounters:   10/28/24 80.3 kg (177 lb)  "  24 80 kg (176 lb 6.4 oz)   24 80.3 kg (177 lb)           Labs & Results:  Lab Results   Component Value Date    WBC 4.20 (L) 10/24/2024    HGB 15.3 10/24/2024    HCT 44.7 10/24/2024    MCV 93 10/24/2024     10/24/2024     BNP   Date Value Ref Range Status   07/10/2024 119 (H) 0 - 100 pg/mL Final     No components found for: \"CHEM\"  Troponin I   Date Value Ref Range Status   2016 0.02 <0.04 ng/mL Final     Results for orders placed during the hospital encounter of 17    Echo complete with contrast if indicated    Narrative  [place] [phone]    Transthoracic Echocardiogram  2D, M-mode, Doppler, and Color Doppler    Study date:  2017    Patient: MARIETTA EUCEDA  MR number: [id number]  Account number: [id number]  : 1945  Age: 71 years  Gender: Male  Status: Routine  Location: Echo lab  Height: 72 in  Weight: 184.6 lb  BP: 118/ 64 mmHg    Indications: Murmur    Diagnoses: 785.2 - CARDIAC MURMURS NEC    Sonographer:  SUNNY Darby  Primary Physician:  Mynor Martinez DO  Referring Physician:  Andrei Small MD  Group:  [facility] [place]  Interpreting Physician:  Alisa Ha DO    SUMMARY    LEFT VENTRICLE:  Systolic function was normal by visual assessment. Ejection fraction was estimated in the range of 55 % to 60 %.  There were no regional wall motion abnormalities.  There was mild concentric hypertrophy.  Doppler parameters were consistent with abnormal left ventricular relaxation (grade 1 diastolic dysfunction).    MITRAL VALVE:  There was trace regurgitation.    AORTIC VALVE:  There was no evidence for stenosis.  There was mild to moderate regurgitation.    TRICUSPID VALVE:  There was mild to moderate regurgitation.  Pulmonary artery systolic pressure was within the normal range.    HISTORY: PRIOR HISTORY: HTN, Hyperlipidemia, Pancreatic Cancer    PROCEDURE: The procedure was performed in " the echo lab. This was a routine study. The transthoracic approach was used. The study included complete 2D imaging, M-mode, complete spectral Doppler, and color Doppler. The heart rate was 65 bpm,  at the start of the study. Image quality was adequate.    LEFT VENTRICLE: Size was normal. Systolic function was normal by visual assessment. Ejection fraction was estimated in the range of 55 % to 60 %. There were no regional wall motion abnormalities. There was mild concentric hypertrophy.  DOPPLER: Doppler parameters were consistent with abnormal left ventricular relaxation (grade 1 diastolic dysfunction).    RIGHT VENTRICLE: The size was normal. Systolic function was normal. DOPPLER: Systolic pressure was within the normal range.    LEFT ATRIUM: The atrium was mildly dilated.    RIGHT ATRIUM: Size was normal.    MITRAL VALVE: Valve structure was normal. There was normal leaflet separation. No echocardiographic evidence for prolapse. DOPPLER: The transmitral velocity was within the normal range. There was no evidence for stenosis. There was trace  regurgitation.    AORTIC VALVE: The valve was trileaflet. Leaflets exhibited normal cuspal separation and sclerosis. DOPPLER: Transaortic velocity was within the normal range. There was no evidence for stenosis. There was mild to moderate regurgitation.    TRICUSPID VALVE: The valve structure was normal. There was normal leaflet separation. DOPPLER: The transtricuspid velocity was within the normal range. There was mild to moderate regurgitation. Pulmonary artery systolic pressure was within  the normal range. Estimated peak PA pressure was 36 mmHg.    PULMONIC VALVE: Leaflets exhibited normal thickness, no calcification, and normal cuspal separation. DOPPLER: The transpulmonic velocity was within the normal range. There was no regurgitation.    PERICARDIUM: There was no thickening. There was no pericardial effusion.    AORTA: The root exhibited normal size.    PULMONARY  ARTERY: The size was normal. The morphology appeared normal.    SYSTEM MEASUREMENT TABLES    2D mode  AoR Diam 2D: 3.5 cm  LA Diam (2D): 4.5 cm  LA/Ao (2D): 1.29  FS (2D Teich): 38.4 %  IVSd (2D): 1.04 cm  LVDEV: 84 cm³  LVESV: 26 cm³  LVIDd(2D): 4.32 cm  LVISd (2D): 2.66 cm  LVPWd (2D): 1.12 cm  SV (Teich): 58 cm³    Apical four chamber  LVEF A4C: 55 %    Apical two chamber  LA Area: 20.7 cm squared  LA Volume: 59 cm³    Unspecified Scan Mode  MV Peak A Cy: 970 mm/s  MV Peak E Cy. Mean: 831 mm/s  MVA (PHT): 3.44 cm squared  PHT: 64 ms  Max P mm[Hg]  V Max: 2470 mm/s  Vmax: 2560 mm/s  RA Area: 16.7 cm squared  RA Volume: 42.1 cm³  TAPSE: 2.7 cm    [employer] Accredited Echocardiography Laboratory    Prepared and electronically signed by    Alisa Ha DO  Signed 2017 11:38:55    No results found for this or any previous visit.    No valid procedures specified.  No results found for this or any previous visit.                This note was completed in part utilizing [employer] direct voice recognition software.   Grammatical errors, random word insertion, spelling mistakes, and incomplete sentences may be an occasional consequence of the system secondary to software limitations, ambient noise and hardware issues. At the time of dictation, efforts were made to edit, clarify and /or correct errors.  Please read the chart carefully and recognize, using context, where substitutions have occurred.  If you have any questions or concerns about the context, text or information contained within the body of this dictation, please contact myself, the provider, for further clarification

## 2024-11-01 ENCOUNTER — APPOINTMENT (OUTPATIENT)
Dept: LAB | Facility: CLINIC | Age: 79
End: 2024-11-01
Payer: COMMERCIAL

## 2024-11-01 DIAGNOSIS — E78.00 HYPERCHOLESTEROLEMIA: ICD-10-CM

## 2024-11-01 LAB
CHOLEST SERPL-MCNC: 142 MG/DL
HDLC SERPL-MCNC: 54 MG/DL
LDLC SERPL CALC-MCNC: 74 MG/DL (ref 0–100)
NONHDLC SERPL-MCNC: 88 MG/DL
TRIGL SERPL-MCNC: 69 MG/DL

## 2024-11-01 PROCEDURE — 80061 LIPID PANEL: CPT

## 2024-11-01 PROCEDURE — 36415 COLL VENOUS BLD VENIPUNCTURE: CPT

## 2024-11-03 ENCOUNTER — RA CDI HCC (OUTPATIENT)
Dept: OTHER | Facility: HOSPITAL | Age: 79
End: 2024-11-03

## 2024-11-04 ENCOUNTER — TELEPHONE (OUTPATIENT)
Dept: CARDIOLOGY CLINIC | Facility: CLINIC | Age: 79
End: 2024-11-04

## 2024-11-04 NOTE — TELEPHONE ENCOUNTER
----- Message from Andrei Small MD sent at 11/4/2024  8:51 AM EST -----  Patient blood test reviewed.  They are acceptable.  Will continue same medication.  Patient should keep his appointment for follow-up.

## 2024-11-06 ENCOUNTER — TELEPHONE (OUTPATIENT)
Age: 79
End: 2024-11-06

## 2024-11-06 NOTE — TELEPHONE ENCOUNTER
New Patient    What is the reason for the patient's appointment?: Nocturia, Weak Urinary Stream    What office location does the patient prefer?: [place]    Does patient have Imaging/Lab Results:  No    Have patient records been requested?:  If No, are the records showing in Epic: Yes    INSURANCE:   Do we accept the patient's insurance or is the patient Self-Pay?: Yes    Insurance Provider: Sergio ORTEGA  Plan Type/Number:   Member ID#: [id number]     HISTORY:   Has the patient had any previous Urologist(s)?:  & Jc Samuels    Was the patient seen in the ED?: No    Has the patient had any outside testing done?: No    Does the patient have a personal history of cancer?: N/A    Pt scheduled for 12/4/24 and placed on wait list.

## 2024-11-07 ENCOUNTER — OFFICE VISIT (OUTPATIENT)
Dept: FAMILY MEDICINE CLINIC | Facility: CLINIC | Age: 79
End: 2024-11-07
Payer: COMMERCIAL

## 2024-11-07 VITALS
TEMPERATURE: 97.8 F | OXYGEN SATURATION: 97 % | RESPIRATION RATE: 14 BRPM | SYSTOLIC BLOOD PRESSURE: 132 MMHG | BODY MASS INDEX: 23.98 KG/M2 | DIASTOLIC BLOOD PRESSURE: 80 MMHG | HEART RATE: 66 BPM | HEIGHT: 72 IN | WEIGHT: 177 LBS

## 2024-11-07 DIAGNOSIS — N40.1 BENIGN PROSTATIC HYPERPLASIA WITH URINARY OBSTRUCTION: ICD-10-CM

## 2024-11-07 DIAGNOSIS — H90.3 SENSORINEURAL HEARING LOSS (SNHL), BILATERAL: ICD-10-CM

## 2024-11-07 DIAGNOSIS — E78.00 HYPERCHOLESTEROLEMIA: ICD-10-CM

## 2024-11-07 DIAGNOSIS — N13.8 BENIGN PROSTATIC HYPERPLASIA WITH URINARY OBSTRUCTION: ICD-10-CM

## 2024-11-07 DIAGNOSIS — Z86.718 HISTORY OF DVT OF LOWER EXTREMITY: ICD-10-CM

## 2024-11-07 DIAGNOSIS — R00.2 PALPITATION: ICD-10-CM

## 2024-11-07 DIAGNOSIS — K63.5 POLYP OF COLON, UNSPECIFIED PART OF COLON, UNSPECIFIED TYPE: ICD-10-CM

## 2024-11-07 DIAGNOSIS — C25.9 MALIGNANT NEOPLASM OF PANCREAS, UNSPECIFIED LOCATION OF MALIGNANCY (HCC): ICD-10-CM

## 2024-11-07 DIAGNOSIS — Z90.81 STATUS POST SPLENECTOMY: ICD-10-CM

## 2024-11-07 DIAGNOSIS — K86.89 PANCREATIC MASS: ICD-10-CM

## 2024-11-07 DIAGNOSIS — Z00.00 MEDICARE ANNUAL WELLNESS VISIT, SUBSEQUENT: Primary | ICD-10-CM

## 2024-11-07 DIAGNOSIS — I10 ESSENTIAL HYPERTENSION: ICD-10-CM

## 2024-11-07 PROBLEM — I82.402 LEFT LEG DVT (HCC): Status: RESOLVED | Noted: 2023-09-11 | Resolved: 2024-11-07

## 2024-11-07 PROCEDURE — 99214 OFFICE O/P EST MOD 30 MIN: CPT | Performed by: STUDENT IN AN ORGANIZED HEALTH CARE EDUCATION/TRAINING PROGRAM

## 2024-11-07 PROCEDURE — G0439 PPPS, SUBSEQ VISIT: HCPCS | Performed by: STUDENT IN AN ORGANIZED HEALTH CARE EDUCATION/TRAINING PROGRAM

## 2024-11-07 NOTE — PATIENT INSTRUCTIONS
Medicare Preventive Visit Patient Instructions  Thank you for completing your Welcome to Medicare Visit or Medicare Annual Wellness Visit today. Your next wellness visit will be due in one year (11/8/2025).  The screening/preventive services that you may require over the next 5-10 years are detailed below. Some tests may not apply to you based off risk factors and/or age. Screening tests ordered at today's visit but not completed yet may show as past due. Also, please note that scanned in results may not display below.  Preventive Screenings:  Service Recommendations Previous Testing/Comments   Colorectal Cancer Screening  Colonoscopy    Fecal Occult Blood Test (FOBT)/Fecal Immunochemical Test (FIT)  Fecal DNA/Cologuard Test  Flexible Sigmoidoscopy Age: 45-75 years old   Colonoscopy: every 10 years (May be performed more frequently if at higher risk)  OR  FOBT/FIT: every 1 year  OR  Cologuard: every 3 years  OR  Sigmoidoscopy: every 5 years  Screening may be recommended earlier than age 45 if at higher risk for colorectal cancer. Also, an individualized decision between you and your healthcare provider will decide whether screening between the ages of 76-85 would be appropriate. Colonoscopy: 06/10/2022  FOBT/FIT: Not on file  Cologuard: Not on file  Sigmoidoscopy: Not on file          Prostate Cancer Screening Individualized decision between patient and health care provider in men between ages of 55-69   Medicare will cover every 12 months beginning on the day after your 50th birthday PSA: No results in last 5 years     Screening Not Indicated     Hepatitis C Screening Once for adults born between 1945 and 1965  More frequently in patients at high risk for Hepatitis C Hep C Antibody: Not on file        Diabetes Screening 1-2 times per year if you're at risk for diabetes or have pre-diabetes Fasting glucose: 119 mg/dL (6/7/2024)  A1C: No results in last 5 years (No results in last 5 years)  Screening Current    Cholesterol Screening Once every 5 years if you don't have a lipid disorder. May order more often based on risk factors. Lipid panel: 11/01/2024  Screening Not Indicated  History Lipid Disorder      Other Preventive Screenings Covered by Medicare:  Abdominal Aortic Aneurysm (AAA) Screening: covered once if your at risk. You're considered to be at risk if you have a family history of AAA or a male between the age of 65-75 who smoking at least 100 cigarettes in your lifetime.  Lung Cancer Screening: covers low dose CT scan once per year if you meet all of the following conditions: (1) Age 55-77; (2) No signs or symptoms of lung cancer; (3) Current smoker or have quit smoking within the last 15 years; (4) You have a tobacco smoking history of at least 20 pack years (packs per day x number of years you smoked); (5) You get a written order from a healthcare provider.  Glaucoma Screening: covered annually if you're considered high risk: (1) You have diabetes OR (2) Family history of glaucoma OR (3)  aged 50 and older OR (4)  American aged 65 and older  Osteoporosis Screening: covered every 2 years if you meet one of the following conditions: (1) Have a vertebral abnormality; (2) On glucocorticoid therapy for more than 3 months; (3) Have primary hyperparathyroidism; (4) On osteoporosis medications and need to assess response to drug therapy.  HIV Screening: covered annually if you're between the age of 15-65. Also covered annually if you are younger than 15 and older than 65 with risk factors for HIV infection. For pregnant patients, it is covered up to 3 times per pregnancy.    Immunizations:  Immunization Recommendations   Influenza Vaccine Annual influenza vaccination during flu season is recommended for all persons aged >= 6 months who do not have contraindications   Pneumococcal Vaccine   * Pneumococcal conjugate vaccine = PCV13 (Prevnar 13), PCV15 (Vaxneuvance), PCV20 (Prevnar 20)  *  Pneumococcal polysaccharide vaccine = PPSV23 (Pneumovax) Adults 19-63 yo with certain risk factors or if 65+ yo  If never received any pneumonia vaccine: recommend Prevnar 20 (PCV20)  Give PCV20 if previously received 1 dose of PCV13 or PPSV23   Hepatitis B Vaccine 3 dose series if at intermediate or high risk (ex: diabetes, end stage renal disease, liver disease)   Respiratory syncytial virus (RSV) Vaccine - COVERED BY MEDICARE PART D  * RSVPreF3 (Arexvy) CDC recommends that adults 60 years of age and older may receive a single dose of RSV vaccine using shared clinical decision-making (SCDM)   Tetanus (Td) Vaccine - COST NOT COVERED BY MEDICARE PART B Following completion of primary series, a booster dose should be given every 10 years to maintain immunity against tetanus. Td may also be given as tetanus wound prophylaxis.   Tdap Vaccine - COST NOT COVERED BY MEDICARE PART B Recommended at least once for all adults. For pregnant patients, recommended with each pregnancy.   Shingles Vaccine (Shingrix) - COST NOT COVERED BY MEDICARE PART B  2 shot series recommended in those 19 years and older who have or will have weakened immune systems or those 50 years and older     Health Maintenance Due:      Topic Date Due   • Hepatitis C Screening  Never done   • Colorectal Cancer Screening  Discontinued     Immunizations Due:  There are no preventive care reminders to display for this patient.  Advance Directives   What are advance directives?  Advance directives are legal documents that state your wishes and plans for medical care. These plans are made ahead of time in case you lose your ability to make decisions for yourself. Advance directives can apply to any medical decision, such as the treatments you want, and if you want to donate organs.   What are the types of advance directives?  There are many types of advance directives, and each state has rules about how to use them. You may choose a combination of any of the  following:  Living will:  This is a written record of the treatment you want. You can also choose which treatments you do not want, which to limit, and which to stop at a certain time. This includes surgery, medicine, IV fluid, and tube feedings.   Durable power of  for healthcare (DPAHC):  This is a written record that states who you want to make healthcare choices for you when you are unable to make them for yourself. This person, called a proxy, is usually a family member or a friend. You may choose more than 1 proxy.  Do not resuscitate (DNR) order:  A DNR order is used in case your heart stops beating or you stop breathing. It is a request not to have certain forms of treatment, such as CPR. A DNR order may be included in other types of advance directives.  Medical directive:  This covers the care that you want if you are in a coma, near death, or unable to make decisions for yourself. You can list the treatments you want for each condition. Treatment may include pain medicine, surgery, blood transfusions, dialysis, IV or tube feedings, and a ventilator (breathing machine).  Values history:  This document has questions about your views, beliefs, and how you feel and think about life. This information can help others choose the care that you would choose.  Why are advance directives important?  An advance directive helps you control your care. Although spoken wishes may be used, it is better to have your wishes written down. Spoken wishes can be misunderstood, or not followed. Treatments may be given even if you do not want them. An advance directive may make it easier for your family to make difficult choices about your care.       © Copyright [employer] 2018 Information is for End User's use only and may not be sold, redistributed or otherwise used for commercial purposes. All illustrations and images included in CareNotes® are the copyrighted property of A.D.A[employer]M., Inc. or [employer]

## 2024-11-07 NOTE — PROGRESS NOTES
Ambulatory Visit  Name: Kevin Schmitz      : 1945      MRN: [id number]  Encounter Provider: Kashmir Oleary DO  Encounter Date: 2024   Encounter department: [facility]    Assessment & Plan  Medicare annual wellness visit, subsequent         Essential hypertension         Malignant neoplasm of pancreas, unspecified location of malignancy (HCC)         Sensorineural hearing loss (SNHL), bilateral         Pancreatic mass         Palpitation         Hypercholesterolemia         History of DVT of lower extremity         Status post splenectomy         Benign prostatic hyperplasia with urinary obstruction         Polyp of colon, unspecified part of colon, unspecified type           Patient is a pleasant 79-year-old male coming in for Medicare annual wellness, follow-up chronic disease management.  Recent palpitations requiring emergency room visit, follow-up with cardiology, now asymptomatic, has a heart monitor in place today, beta-blocker therapy.  Patient has overall been asymptomatic, continue to monitor at this time, blood work reviewed, patient will also follow-up with urology for further evaluation on benign prostatic hyperplasia.      Depression Screening and Follow-up Plan: Patient was screened for depression during today's encounter. They screened negative with a PHQ-2 score of 0.      Preventive health issues were discussed with patient, and age appropriate screening tests were ordered as noted in patient's After Visit Summary. Personalized health advice and appropriate referrals for health education or preventive services given if needed, as noted in patient's After Visit Summary.    History of Present Illness     HPI   Patient Care Team:  Kashmir Oleary DO as PCP - General (Family Medicine)  Andrei Small MD    Review of Systems   Constitutional:  Negative for chills and fever.   HENT:  Negative for ear pain and sore throat.    Eyes:  Negative for  pain and visual disturbance.   Respiratory:  Negative for cough and shortness of breath.    Cardiovascular:  Negative for chest pain and palpitations.   Gastrointestinal:  Negative for abdominal pain and vomiting.   Genitourinary:  Negative for dysuria and hematuria.   Musculoskeletal:  Negative for arthralgias and back pain.   Skin:  Negative for color change and rash.   Neurological:  Negative for seizures and syncope.   All other systems reviewed and are negative.    Medical History Reviewed by provider this encounter:  Tobacco  Allergies  Meds  Problems  Med Hx  Surg Hx  Fam Hx       Annual Wellness Visit Questionnaire   Kevin is here for his Subsequent Wellness visit. Last Medicare Wellness visit information reviewed, patient interviewed and updates made to the record today.      Health Risk Assessment:   Patient rates overall health as good. Patient feels that their physical health rating is same. Patient is satisfied with their life. Eyesight was rated as same. Hearing was rated as same. Patient feels that their emotional and mental health rating is same. Patients states they are never, rarely angry. Patient states they are sometimes unusually tired/fatigued. Pain experienced in the last 7 days has been some. Patient's pain rating has been 1/10. Patient states that he has experienced no weight loss or gain in last 6 months.     Depression Screening:   PHQ-2 Score: 0      Fall Risk Screening:   In the past year, patient has experienced: no history of falling in past year      Home Safety:  Patient does not have trouble with stairs inside or outside of their home. Patient has working smoke alarms and has working carbon monoxide detector. Home safety hazards include: none.     Nutrition:   Current diet is Regular.     Medications:   Patient is not currently taking any over-the-counter supplements. Patient is able to manage medications.     Activities of Daily Living (ADLs)/Instrumental Activities of  Daily Living (IADLs):   Walk and transfer into and out of bed and chair?: Yes  Dress and groom yourself?: Yes    Bathe or shower yourself?: Yes    Feed yourself? Yes  Do your laundry/housekeeping?: Yes  Manage your money, pay your bills and track your expenses?: Yes  Make your own meals?: Yes    Do your own shopping?: Yes    Previous Hospitalizations:   Any hospitalizations or ED visits within the last 12 months?: No      Advance Care Planning:   Living will: Yes    Durable POA for healthcare: No    Advanced directive: Yes      Cognitive Screening:   Provider or family/friend/caregiver concerned regarding cognition?: No    PREVENTIVE SCREENINGS      Cardiovascular Screening:    General: History Lipid Disorder and Screening Current      Diabetes Screening:     General: Screening Current      Colorectal Cancer Screening:     General: Screening Current      Prostate Cancer Screening:    General: Screening Not Indicated      Osteoporosis Screening:    General: Screening Not Indicated      Abdominal Aortic Aneurysm (AAA) Screening:        General: Screening Not Indicated      Lung Cancer Screening:     General: Screening Not Indicated      Hepatitis C Screening:    General: Risks and Benefits Discussed    Screening, Brief Intervention, and Referral to Treatment (SBIRT)    Screening  Typical number of drinks in a day: 2  Typical number of drinks in a week: 20  Interpretation: Low risk drinking behavior.    Single Item Drug Screening:  How often have you used an illegal drug (including marijuana) or a prescription medication for non-medical reasons in the past year? never    Single Item Drug Screen Score: 0  Interpretation: Negative screen for possible drug use disorder    Brief Intervention  Alcohol & drug use screenings were reviewed. No concerns regarding substance use disorder identified.     Other Counseling Topics:   Car/seat belt/driving safety, skin self-exam, sunscreen and calcium and vitamin D intake and regular  weightbearing exercise.     Social Determinants of Health     Financial Resource Strain: Low Risk  (9/12/2023)    Overall Financial Resource Strain (CARDIA)     Difficulty of Paying Living Expenses: Not very hard   Food Insecurity: Unknown (11/5/2021)    [facility] from [facility] Vital Sign     Worried About Running Out of Food in the Last Year: Never true   Transportation Needs: No Transportation Needs (9/12/2023)    PRAPARE - Transportation     Lack of Transportation (Medical): No     Lack of Transportation (Non-Medical): No    [facility]    Housing Stability     No results found.    Objective     /80 (BP Location: Left arm, Patient Position: Sitting, Cuff Size: Standard)   Pulse 66   Temp 97.8 °F (36.6 °C) (Temporal)   Resp 14   Ht 6' (1.829 m)   Wt 80.3 kg (177 lb)   SpO2 97%   BMI 24.01 kg/m²     Physical Exam  Vitals and nursing note reviewed.   Constitutional:       General: He is not in acute distress.     Appearance: He is well-developed.   HENT:      Head: Normocephalic and atraumatic.   Eyes:      General: No scleral icterus.     Conjunctiva/sclera: Conjunctivae normal.   Cardiovascular:      Rate and Rhythm: Normal rate and regular rhythm.      Heart sounds: No murmur heard.  Pulmonary:      Effort: Pulmonary effort is normal. No respiratory distress.      Breath sounds: Normal breath sounds.   Abdominal:      General: Bowel sounds are normal. There is no distension.      Palpations: Abdomen is soft.      Tenderness: There is no abdominal tenderness.   Musculoskeletal:         General: No swelling. Normal range of motion.      Cervical back: Neck supple.   Skin:     General: Skin is warm and dry.      Capillary Refill: Capillary refill takes less than 2 seconds.   Neurological:      General: No focal deficit present.      Mental Status: He is alert and oriented to person, place, and time. Mental status is at  baseline.   Psychiatric:         Mood and Affect: Mood normal.         Behavior: Behavior normal.

## 2024-11-12 ENCOUNTER — HOSPITAL ENCOUNTER (OUTPATIENT)
Dept: NON INVASIVE DIAGNOSTICS | Facility: HOSPITAL | Age: 79
Discharge: HOME | End: 2024-11-12

## 2024-11-12 ENCOUNTER — HOSPITAL ENCOUNTER (OUTPATIENT)
Dept: RADIOLOGY | Facility: HOSPITAL | Age: 79
Discharge: HOME | End: 2024-11-12
Payer: COMMERCIAL

## 2024-11-12 ENCOUNTER — TELEPHONE (OUTPATIENT)
Dept: CARDIOLOGY CLINIC | Facility: CLINIC | Age: 79
End: 2024-11-12

## 2024-11-12 VITALS
OXYGEN SATURATION: 98 % | HEART RATE: 65 BPM | RESPIRATION RATE: 20 BRPM | SYSTOLIC BLOOD PRESSURE: 126 MMHG | DIASTOLIC BLOOD PRESSURE: 70 MMHG

## 2024-11-12 DIAGNOSIS — R07.9 CHEST PAIN, UNSPECIFIED TYPE: ICD-10-CM

## 2024-11-12 DIAGNOSIS — I10 ESSENTIAL HYPERTENSION: ICD-10-CM

## 2024-11-12 DIAGNOSIS — I49.3 PVC'S (PREMATURE VENTRICULAR CONTRACTIONS): ICD-10-CM

## 2024-11-12 DIAGNOSIS — E78.00 HYPERCHOLESTEROLEMIA: ICD-10-CM

## 2024-11-12 LAB
CHEST PAIN STATEMENT: NORMAL
MAX DIASTOLIC BP: 80 MMHG
MAX HR PERCENT: 72 %
MAX HR: 104 BPM
MAX PREDICTED HEART RATE: 143 BPM
PROTOCOL NAME: NORMAL
RATE PRESSURE PRODUCT: NORMAL
REASON FOR TERMINATION: NORMAL
SL CV REST NUCLEAR ISOTOPE DOSE: 11 MCI
SL CV STRESS NUCLEAR ISOTOPE DOSE: 32.1 MCI
SL CV STRESS RECOVERY BP: NORMAL MMHG
SL CV STRESS RECOVERY HR: 75 BPM
SL CV STRESS RECOVERY O2 SAT: 98 %
SL CV STRESS STAGE REACHED: 3
STRESS ANGINA INDEX: 0
STRESS BASELINE BP: NORMAL MMHG
STRESS BASELINE HR: 65 BPM
STRESS O2 SAT REST: 98 %
STRESS PEAK HR: 104 BPM
STRESS POST ESTIMATED WORKLOAD: 10.1 METS
STRESS POST EXERCISE DUR MIN: 7 MIN
STRESS POST EXERCISE DUR MIN: 7 MIN
STRESS POST EXERCISE DUR SEC: 42 SEC
STRESS POST EXERCISE DUR SEC: 42 SEC
STRESS POST O2 SAT PEAK: 98 %
STRESS POST PEAK BP: 160 MMHG
STRESS POST PEAK HR: 104 BPM
STRESS POST PEAK SYSTOLIC BP: 160 MMHG
TARGET HR FORMULA: NORMAL
TEST INDICATION: NORMAL

## 2024-11-12 PROCEDURE — 78452 HT MUSCLE IMAGE SPECT MULT: CPT | Performed by: INTERNAL MEDICINE

## 2024-11-12 PROCEDURE — 93018 CV STRESS TEST I&R ONLY: CPT | Performed by: INTERNAL MEDICINE

## 2024-11-12 PROCEDURE — 78452 HT MUSCLE IMAGE SPECT MULT: CPT

## 2024-11-12 PROCEDURE — A9502 TC99M TETROFOSMIN: HCPCS

## 2024-11-12 PROCEDURE — 93017 CV STRESS TEST TRACING ONLY: CPT

## 2024-11-12 PROCEDURE — 93016 CV STRESS TEST SUPVJ ONLY: CPT | Performed by: INTERNAL MEDICINE

## 2024-11-12 RX ORDER — REGADENOSON 0.08 MG/ML
0.4 INJECTION, SOLUTION INTRAVENOUS ONCE
Status: COMPLETED | OUTPATIENT
Start: 2024-11-12 | End: 2024-11-12

## 2024-11-12 RX ADMIN — REGADENOSON 0.4 MG: 0.08 INJECTION, SOLUTION INTRAVENOUS at 08:33

## 2024-11-12 NOTE — TELEPHONE ENCOUNTER
----- Message from Andrei Small MD sent at 11/12/2024  1:17 PM EST -----  Pt's Patient's stress test is normal.   Patient can keep regular appointment.    Please call patient with the result.

## 2024-11-21 ENCOUNTER — RESULTS FOLLOW-UP (OUTPATIENT)
Dept: CARDIOLOGY CLINIC | Facility: CLINIC | Age: 79
End: 2024-11-21

## 2024-11-21 ENCOUNTER — CLINICAL SUPPORT (OUTPATIENT)
Dept: CARDIOLOGY CLINIC | Facility: CLINIC | Age: 79
End: 2024-11-21
Payer: COMMERCIAL

## 2024-11-21 DIAGNOSIS — R00.2 PALPITATIONS: ICD-10-CM

## 2024-11-21 PROCEDURE — 93248 EXT ECG>7D<15D REV&INTERPJ: CPT | Performed by: INTERNAL MEDICINE

## 2024-11-25 NOTE — TELEPHONE ENCOUNTER
----- Message from Andrei Small MD sent at 11/25/2024  8:44 AM EST -----  Patient's extended monitoring strips reviewed.  Patient has episodes of SVT with intermittent bundle branch block.  Average heart rate acceptable.  Please asked patient to keep appointment.    Thank you Gay

## 2024-12-04 ENCOUNTER — OFFICE VISIT (OUTPATIENT)
Dept: UROLOGY | Facility: CLINIC | Age: 79
End: 2024-12-04
Payer: COMMERCIAL

## 2024-12-04 VITALS
OXYGEN SATURATION: 96 % | DIASTOLIC BLOOD PRESSURE: 70 MMHG | HEART RATE: 72 BPM | BODY MASS INDEX: 23.35 KG/M2 | SYSTOLIC BLOOD PRESSURE: 126 MMHG | HEIGHT: 73 IN

## 2024-12-04 DIAGNOSIS — R35.1 NOCTURIA: Primary | ICD-10-CM

## 2024-12-04 DIAGNOSIS — R39.11 BENIGN PROSTATIC HYPERPLASIA WITH URINARY HESITANCY: ICD-10-CM

## 2024-12-04 DIAGNOSIS — N40.1 BENIGN PROSTATIC HYPERPLASIA WITH URINARY HESITANCY: ICD-10-CM

## 2024-12-04 DIAGNOSIS — Z12.5 SCREENING FOR PROSTATE CANCER: ICD-10-CM

## 2024-12-04 DIAGNOSIS — R33.9 INCOMPLETE BLADDER EMPTYING: ICD-10-CM

## 2024-12-04 LAB — POST-VOID RESIDUAL VOLUME, ML POC: 314 ML

## 2024-12-04 PROCEDURE — 51798 US URINE CAPACITY MEASURE: CPT | Performed by: PHYSICIAN ASSISTANT

## 2024-12-04 PROCEDURE — 99214 OFFICE O/P EST MOD 30 MIN: CPT | Performed by: PHYSICIAN ASSISTANT

## 2024-12-04 RX ORDER — ALFUZOSIN HYDROCHLORIDE 10 MG/1
10 TABLET, EXTENDED RELEASE ORAL DAILY
Qty: 90 TABLET | Refills: 3 | Status: SHIPPED | OUTPATIENT
Start: 2024-12-04

## 2024-12-04 RX ORDER — TADALAFIL 5 MG/1
5 TABLET ORAL DAILY PRN
Qty: 90 TABLET | Refills: 3 | Status: SHIPPED | OUTPATIENT
Start: 2024-12-04

## 2024-12-04 NOTE — PROGRESS NOTES
Kevin Schmitz is a(n) 79 y.o. male. , :  1945    Subjective     Assessment:  The primary encounter diagnosis was Nocturia. Diagnoses of Benign prostatic hyperplasia with urinary hesitancy and Screening for prostate cancer were also pertinent to this visit.  BPH  BPH on alfuzosin 10mg  in AM   Gynecomastia from finasteride.  No desire to retry   Incomplete bladder emptying   Bladder scan for 314ml.  No void prior.     Plan:  F/u uroflow/PVR and cystoscopy/TRUS to evaluate for blockage  Continue the alfuzosin 10mg in AM  OK to try tadalafil 5mg HS to help with BPH.  CP, SOB, GERD, back pain, HA, facial flushing   PSA prior to f/u.       Radiology      Labs  04/19/10 PSA 2.0 Testosterone 337, Free T 6.3  09/03/10 PSA 2.0 Testosterone 485  10/20/10 Testosterone 355  12/14/10 Testosterone 385 s/p Testopel  12 PSA 1.8 Testosterone 339  13 PSA 1.8  14 PSA 2.9  05/04/15 PSA 2.0  16 PSA 2.5        History  1. BPH, slow stream and elevated PVR on alfuzosin 10mg since at least , thinks it helps. Stopped finasteride secondary to breast enlargement  2. ED, uses Levitra 5 & 10 mg. Rarely. Like never since wife no desire.  3. Hypogonadism, tried AndroGel and stopped, tried pellets. Tolerable. Stopped as not helping.  4. Peyronie’s disease, not an issue  5. 10/11/18 AUA 17/3. 10/11/19 AUA 25/5 07/10/20 19/3     Past  surgical history       PMH  DVT  Splenectomy  Babesiosis   Pancreatic cancer  with hx of whipple procedure and follows with [facility]  HTN  Chronic fatigue       22 OV Arvind [facility]   76 y.o. male with hx of BPH and stopped his finasteride but continued with alfuzosin. Presents for f/u repeat Uroflow/PVR and STEVEN. Notes a decent stream on the afluzosin. Some days better than other. No straining or hematuira with voiding.     Review of Systems   Constitutional: Negative for night sweats and unplanned weight loss.   Musculoskeletal: Negative for back pain (no nocturnal  "bone pain ).     Genitourinary:  Rectum: Normal.   Comments: Prostate grade II  Soft, NT  No nodularity       Assessment:    BPH on alfuzosin 10mg (Gyncomastia from finasteride)   Desire to avoid cystoscopy or procedure     Plan:  Patient Instructions   F/u 1 year STEVEN for Uroflow/PVR  Continue the alfuozosin 10mg daily     12/4/2024 OV Jc Arvind  79-year-old male with obstructive BPH developed gynecomastia on finasteride.  Subsequently was changed to tamsulosin.  Patient was evaluated at [facility] urology and was noted to have  cc for which cystoscopy with transrectal ultrasound was suggested.  Currently presents to establish care at [facility] urolog.  Patient today was unable to void but was bladder scanned for 314 mL. Urinary frequency, urgency, nocturia, and weaker stream has been increasing for the past couple months.  No change in medication.         Review of Systems    No results found for: \"PSA\"  No results found for: \"TESTOSTERONE\"  No components found for: \"CR\"  No results found for: \"HBA1C\"    Objective     /70 (BP Location: Left arm, Patient Position: Sitting, Cuff Size: Adult)   Pulse 72   Ht 6' 1\" (1.854 m)   SpO2 96%   BMI 23.35 kg/m²     Physical Exam  HENT:      Head: Normocephalic.   Cardiovascular:      Rate and Rhythm: Normal rate.   Pulmonary:      Effort: Pulmonary effort is normal.   Abdominal:      General: Abdomen is flat.   Genitourinary:     Comments: Prostate   Grade II  Soft, NT  No nodules   Skin:     General: Skin is warm.   Neurological:      Mental Status: He is alert.           Jc Samuels [facility] [place]  "

## 2024-12-06 ENCOUNTER — APPOINTMENT (OUTPATIENT)
Dept: LAB | Facility: CLINIC | Age: 79
End: 2024-12-06
Payer: COMMERCIAL

## 2024-12-06 DIAGNOSIS — Z12.5 SCREENING FOR PROSTATE CANCER: ICD-10-CM

## 2024-12-06 LAB — PSA SERPL-MCNC: 7.58 NG/ML (ref 0–4)

## 2024-12-06 PROCEDURE — 36415 COLL VENOUS BLD VENIPUNCTURE: CPT

## 2024-12-06 PROCEDURE — G0103 PSA SCREENING: HCPCS

## 2024-12-12 DIAGNOSIS — I10 ESSENTIAL HYPERTENSION: ICD-10-CM

## 2024-12-12 DIAGNOSIS — E78.00 HYPERCHOLESTEROLEMIA: ICD-10-CM

## 2024-12-12 RX ORDER — ROSUVASTATIN CALCIUM 5 MG/1
5 TABLET, COATED ORAL DAILY
Qty: 90 TABLET | Refills: 1 | Status: SHIPPED | OUTPATIENT
Start: 2024-12-12

## 2024-12-12 RX ORDER — AMLODIPINE BESYLATE 10 MG/1
10 TABLET ORAL DAILY
Qty: 90 TABLET | Refills: 1 | Status: SHIPPED | OUTPATIENT
Start: 2024-12-12

## 2024-12-19 ENCOUNTER — PROCEDURE VISIT (OUTPATIENT)
Dept: UROLOGY | Facility: CLINIC | Age: 79
End: 2024-12-19
Payer: COMMERCIAL

## 2024-12-19 ENCOUNTER — PREP FOR PROCEDURE (OUTPATIENT)
Dept: UROLOGY | Facility: CLINIC | Age: 79
End: 2024-12-19

## 2024-12-19 ENCOUNTER — TELEPHONE (OUTPATIENT)
Dept: UROLOGY | Facility: CLINIC | Age: 79
End: 2024-12-19

## 2024-12-19 VITALS
DIASTOLIC BLOOD PRESSURE: 70 MMHG | BODY MASS INDEX: 23.86 KG/M2 | HEIGHT: 73 IN | WEIGHT: 180 LBS | SYSTOLIC BLOOD PRESSURE: 136 MMHG | HEART RATE: 81 BPM | OXYGEN SATURATION: 97 %

## 2024-12-19 DIAGNOSIS — N40.1 BENIGN PROSTATIC HYPERPLASIA WITH URINARY HESITANCY: Primary | ICD-10-CM

## 2024-12-19 DIAGNOSIS — R39.11 BENIGN PROSTATIC HYPERPLASIA WITH URINARY HESITANCY: Primary | ICD-10-CM

## 2024-12-19 LAB — POST-VOID RESIDUAL VOLUME, ML POC: 250 ML

## 2024-12-19 PROCEDURE — 51798 US URINE CAPACITY MEASURE: CPT | Performed by: UROLOGY

## 2024-12-19 PROCEDURE — 52000 CYSTOURETHROSCOPY: CPT | Performed by: UROLOGY

## 2024-12-19 RX ORDER — SODIUM CHLORIDE, SODIUM LACTATE, POTASSIUM CHLORIDE, CALCIUM CHLORIDE 600; 310; 30; 20 MG/100ML; MG/100ML; MG/100ML; MG/100ML
125 INJECTION, SOLUTION INTRAVENOUS CONTINUOUS
OUTPATIENT
Start: 2024-12-19

## 2024-12-19 NOTE — PROGRESS NOTES
Cystoscopy     Date/Time  12/19/2024 8:30 AM     Performed by  Arturo Aguila MD   Authorized by  Arturo Aguila MD     Universal Protocol:  Consent: Written consent obtained.  Risks and benefits: risks, benefits and alternatives were discussed  Consent given by: patient  Patient understanding: patient states understanding of the procedure being performed  Patient consent: the patient's understanding of the procedure matches consent given  Procedure consent: procedure consent matches procedure scheduled  Patient identity confirmed: verbally with patient      Procedure Details:  Procedure type: cystoscopy    Patient tolerance: Patient tolerated the procedure well with no immediate complications    Additional Procedure Details: Indication: Lower urinary tract symptoms  Last office visit 12/4/2024 Arvind  Assessment:  The primary encounter diagnosis was Nocturia. Diagnoses of Benign prostatic hyperplasia with urinary hesitancy and Screening for prostate cancer were also pertinent to this visit.  BPH  BPH on alfuzosin 10mg  in AM   Gynecomastia from finasteride.  No desire to retry   Incomplete bladder emptying   Bladder scan for 314ml.  No void prior.      Plan:  F/u uroflow/PVR and cystoscopy/TRUS to evaluate for blockage  Continue the alfuzosin 10mg in AM  OK to try tadalafil 5mg HS to help with BPH.  CP, SOB, GERD, back pain, HA, facial flushing   PSA prior to f/u.     Findings: Uroflow PVR peak 9 mL/s average 6 mL/s typical for him.   mL.  Voided volume 176 mL.  Cystoscopy shows mild bladder trabeculation.  No stones or tumors.  No strictures.  Prostate 2-1/2 cm lateral lobe obstruction.  Median lobe pushing into the bladder neck 1-1/2 cm.  Flap valve.  Bladder drained 750 mL via the scope.    Plan: Patient's brother had UroLift.  Not satisfied.  Patient prefers procedure to remove tissue.  Discussed bipolar TURP and its risks and benefits.  Refuses to do self cath.  Refuses to add finasteride.   Had breast enlargement from prior use.  Is away in January and February for the winter in [place].  Prefers to do bipolar TURP when returns in March.  Otherwise healthy does not need medical clearance.  Aware that we will have to hold his aspirin for a week prior.  Continue alfuzosin for now.

## 2024-12-20 NOTE — TELEPHONE ENCOUNTER
Patient returned call and confirmed surgery date of 3/12/25 with Dr Aguila at [facility].

## 2024-12-23 ENCOUNTER — PREP FOR PROCEDURE (OUTPATIENT)
Dept: UROLOGY | Facility: CLINIC | Age: 79
End: 2024-12-23

## 2024-12-23 DIAGNOSIS — R39.89 SUSPECTED UTI: Primary | ICD-10-CM

## 2024-12-23 DIAGNOSIS — Z01.810 PRE-OPERATIVE CARDIOVASCULAR EXAMINATION: ICD-10-CM

## 2024-12-23 DIAGNOSIS — R39.11 BENIGN PROSTATIC HYPERPLASIA WITH URINARY HESITANCY: ICD-10-CM

## 2024-12-23 DIAGNOSIS — N40.1 BENIGN PROSTATIC HYPERPLASIA WITH URINARY HESITANCY: ICD-10-CM

## 2024-12-23 DIAGNOSIS — Z01.812 PRE-OPERATIVE LABORATORY EXAMINATION: ICD-10-CM

## 2024-12-23 NOTE — TELEPHONE ENCOUNTER
Spoke with patient and confirmed surgery date of: 03/12/25  Type of surgery: Bipolar turp   Operating physician: Dr. Aguila  Location of surgery: [facility]     Verbally went over prep with patient on: 12/23/24  NPO  Bowel prep? No  Hospital calls afternoon prior with arrival time -Calls Friday afternoon for Monday surgeries  Patient needs ride to and from surgery (outpatient/inpatient)   Pre-op testing to be done 2 weeks prior to surgery  Cbc bmp t/s u/c ekg  Blood thinners:   Pat will call a week prior   Clearances needed: none   Mailed/emailed to patient on: 12/23/24  Copy of packet scanned into Media on:12/23/24  Labs in packet  Soap / Bowel prep in packet  Pre-op & Post-op in packet  Dates of H&P and post-op if needed    Consent: in Media

## 2024-12-24 ENCOUNTER — OFFICE VISIT (OUTPATIENT)
Dept: CARDIOLOGY CLINIC | Facility: CLINIC | Age: 79
End: 2024-12-24
Payer: COMMERCIAL

## 2024-12-24 VITALS
OXYGEN SATURATION: 97 % | SYSTOLIC BLOOD PRESSURE: 132 MMHG | HEIGHT: 73 IN | DIASTOLIC BLOOD PRESSURE: 64 MMHG | BODY MASS INDEX: 23.19 KG/M2 | HEART RATE: 69 BPM | WEIGHT: 175 LBS

## 2024-12-24 DIAGNOSIS — I49.3 PVC'S (PREMATURE VENTRICULAR CONTRACTIONS): ICD-10-CM

## 2024-12-24 DIAGNOSIS — I10 ESSENTIAL HYPERTENSION: ICD-10-CM

## 2024-12-24 DIAGNOSIS — E78.2 MIXED HYPERLIPIDEMIA: ICD-10-CM

## 2024-12-24 DIAGNOSIS — K86.89 PANCREATIC MASS: ICD-10-CM

## 2024-12-24 DIAGNOSIS — R01.1 CARDIAC MURMUR: ICD-10-CM

## 2024-12-24 DIAGNOSIS — I11.9 HYPERTENSIVE HEART DISEASE WITHOUT HEART FAILURE: ICD-10-CM

## 2024-12-24 DIAGNOSIS — R00.2 PALPITATIONS: ICD-10-CM

## 2024-12-24 PROCEDURE — 99214 OFFICE O/P EST MOD 30 MIN: CPT | Performed by: INTERNAL MEDICINE

## 2024-12-24 NOTE — PROGRESS NOTES
Progress Note - Cardiology Office  Kevin Schmitz 79 y.o. male MRN: [id number]  : 1945  Encounter: [id number]      Assessment:     1. Hypertensive heart disease without heart failure    2. PVC's (premature ventricular contractions)    3. Cardiac murmur    4. Palpitations    5. Mixed hyperlipidemia    6. Pancreatic mass    7. Essential hypertension          Discussion Summary and Plan:    1. History of pancreatic cancer status post Whipple procedure.  Patient regularly follows up at [facility].  Has good appetite.  He follows with [facility].  No issues.  [facility] note from 2023 of Dr. Agustin reviewed.  Mr. Kevin Schmitz is a 77 y.o. male with history of well-differentiated pancreatic neuroendocrine tumor, who underwent robotic converted to open distal pancreatectomy on 2016 by Dr. Blake Moreno. He returns today for interval surveillance, now almost 7 years out from curative-intent surgery.    We reviewed his CT from yesterday, which demonstrates no evidence of recurrent or metastatic disease. There is a stable cystic lesion in the liver that was present prior to surgery. An MRI would provide further information but given it has been stable for many years, I do not think this is necessary at this time. I recommended repeating imaging at 10 years from surgery, and we can obtain an MRI instead of CT at that time. If this remains stable we can discontinue routine surveillance with imaging and switch to symptom-based monitoring. He is most likely cured and the risk of recurrence beyond 10 years is very low.      2.    Hypertensive heart disease without heart failure.  Blood pressure is acceptable.  Currently he is taking Coreg 12 point 5 in the morning and 6.2 5 in the evening along with amlodipine 10 mg daily.  Blood pressure has been stable.    3. Palpitations.  His monitoring shows he had a short atrial runs.  But he is totally asymptomatic.  He is already on beta-blocker we will  continue same medications.    4. Dyslipidemia.  Continue Crestor 5 mg.  Rest of profile in 2/2024 are stable.      5. History of BPH on Flomax.  He is tolerating it very well.  Management as per Urology    6. Cardiac murmur.  Patient has ejection systolic murmur.  Previous echo shows no changes.  Clinically no change in quality of the heart murmur.     7. History of Whipple procedure follows with [facility] will restart please contact with them.     plan.     follow-up 9 months      Patient was advised and educated to call our office  immediately if  patient has any new symptoms of chest pain/shortness of breath, near-syncope, syncope, light headedness sustained palpitations  or any other cardiovascular symptoms before their scheduled follow-up appointment.  Office #[phone].     follow-up in 6 months      Counseling :  A description of the counseling.  Regular exercise, education about pathophysiology of coronary artery disease discussed with patient at length.  Patient's ability to self care: Yes  Medication side effect reviewed with patient in detail and all their questions answered to their satisfaction.    HPI :     Kevin Schmitz is a 79 y.o. year old male who came for follow up. Patient has with a past medical history significant for borderline hypertension, incomplete RBBB, pancreatic cancer status post Whipple procedure at [facility], palpitations, dyslipidemia and BPH who came for regular follow-up. He's been doing pretty well in fact he has gained around 20 pounds. Denies any chest pain. Any shortness of breath or any other significant complaint.  last cardiac workup was in 2013 which shows he had normal LV function mild MR and trace AI. He is scheduled to go to [facility] for follow-up on his Whipple procedure      10/01/2020.    Above reviewed.  Patient came for follow-up.  He is doing well.  He has recovered well from his valve uses and incarcerated hernia.  He is doing well denies  any chest pain any shortness of breath today heart rate is 67 beats per minute but blood pressure on arrival was found to be slightly elevated.  His doing well from cardiac point of view.  No chest pain no shortness of breath no dizziness no lightheadedness no nausea no vomiting no PND no orthopnea.  He has medical history significant for essential hypertension, dyslipidemia, history of Whipple procedure for pancreatic issues.  No other significant complaint at this time.  Blood pressure checked by me was 138/78.  Patient was recently started on prostate medications.  He has been compliant with his cholesterol medication but no recent blood test.      10/06/2022    Above reviewed.  Patient came for follow-up.  He is doing well no new complaints.  His medical history significant for essential hypertension, dyslipidemia, history of pancreatic mass status post Whipple procedure.  He is doing well from cardiac point of view.  He could not completed exercise stress test as he start having knee pain.  He exercise to achieve heart rate 70 3% maximum predicted heart rate.  Blood test done April 2022 has been stable.  Today EKG shows heart rate 70 beats per minute.  No other cardiovascular issues at this time.    5/30/2023.    Above reviewed.  Patient came for follow-up he is doing well he has no new complaints.  He is feeling great.  His medical history significant for essential hypertension, dyslipidemia, history of pancreatic mass s/p Whipple procedure and was found to have benign mass.  From cardiac point of view he has no nausea no vomiting no fever no chills no other cardiovascular issues.  EKG today shows heart rate 58 bpm no change from previous EKG.  His blood test in October 2022 was stable Cresta profile and labs were reviewed.  His med list reviewed he continues take his blood pressure medicine along with Crestor.  He is on Coreg 9.375 twice a day, amlodipine 10 mg daily, Crestor and Dyazide half tablet daily.   Blood pressure has been stable since then.    12/24/2024.    Above reviewed.  Patient came for follow-up.  He is doing well he has no new complaints.  He has medical history significant for hypertensive heart disease, dyslipidemia, history of pancreatic mass status post Whipple procedure and was found to have benign mass who came for follow-up.  In October he had an episode of chest pain.  He was ruled out and had a nuclear stress test done early November which was nonischemic.  He has no new cardiovascular issues.  His medication reviewed blood pressure and heart rate has been acceptable blood test from October 2024 is stable.  He did extended monitoring in November 2020 which shows he has short atrial runs 1 was a longest for 12 seconds.  No other sustained arrhythmias were noted.      Review of Systems   Constitutional:  Negative for activity change, chills, diaphoresis, fever and unexpected weight change.   HENT:  Negative for congestion.    Eyes:  Negative for discharge and redness.   Respiratory:  Negative for cough, chest tightness, shortness of breath and wheezing.    Cardiovascular: Negative.  Negative for chest pain, palpitations and leg swelling.   Gastrointestinal:  Negative for abdominal pain, diarrhea and nausea.   Endocrine: Negative.    Genitourinary:  Negative for decreased urine volume and urgency.   Musculoskeletal: Negative.  Negative for arthralgias, back pain and gait problem.   Skin:  Negative for rash and wound.   Allergic/Immunologic: Negative.    Neurological:  Negative for dizziness, seizures, syncope, weakness, light-headedness and headaches.   Hematological: Negative.    Psychiatric/Behavioral:  Negative for agitation and confusion. The patient is not nervous/anxious.        Historical Information   Past Medical History:   Diagnosis Date   • BEP (benign enlargement of prostate)    • Cancer (HCC)     pancreatic   whipple procedure  basal cell   • Cardiac murmur    • Colon polyp    •  Hyperlipidemia    • Hypertension    • Positive colorectal cancer screening using Cologuard test 01/2021   • PVC (premature ventricular contraction) 1990's    hx of, most recent stress test 2018 - normal results per pt   • Right bundle branch block      Past Surgical History:   Procedure Laterality Date   • APPENDECTOMY     • COLONOSCOPY     • HERNIA REPAIR      right inguinal x2 and left inguinal x1   • PANCREAS SURGERY     • SPLENECTOMY, TOTAL     • WHIPPLE PROCEDURE/PANCREATICO-DUODENECTOMY       Social History     Substance and Sexual Activity   Alcohol Use Yes   • Alcohol/week: 10.0 standard drinks of alcohol   • Types: 10 Cans of beer per week    Comment: 5-6 daily     Social History     Substance and Sexual Activity   Drug Use No     Social History     Tobacco Use   Smoking Status Never   Smokeless Tobacco Never     Family History:   Family History   Problem Relation Age of Onset   • Hypertension Mother    • Hypertension Father    • Cancer Father    • Cancer Daughter        Meds/Allergies     No Known Allergies    Current Outpatient Medications:   •  alfuzosin (UROXATRAL) 10 mg 24 hr tablet, Take 1 tablet (10 mg total) by mouth daily, Disp: 90 tablet, Rfl: 3  •  amLODIPine (NORVASC) 10 mg tablet, TAKE 1 TABLET BY MOUTH DAILY, Disp: 90 tablet, Rfl: 1  •  Ascorbic Acid (vitamin C) 1000 MG tablet, Take 1,000 mg by mouth daily, Disp: , Rfl:   •  aspirin 81 mg chewable tablet, Chew 81 mg daily  , Disp: , Rfl:   •  carvedilol (COREG) 6.25 mg tablet, TAKE 2 TABLETS BY MOUTH TWICE  DAILY, Disp: 240 tablet, Rfl: 5  •  cholecalciferol (VITAMIN D3) 1,000 units tablet, Take 5,000 Units by mouth daily, Disp: , Rfl:   •  rosuvastatin (CRESTOR) 5 mg tablet, TAKE 1 TABLET BY MOUTH DAILY, Disp: 90 tablet, Rfl: 1  •  spironolactone-hydrochlorothiazide (ALDACTAZIDE) 25-25 mg per tablet, TAKE ONE-HALF TABLET BY MOUTH  DAILY, Disp: 135 tablet, Rfl: 1  •  tadalafil (CIALIS) 5 MG tablet, Take 1 tablet (5 mg total) by mouth daily as  "needed for erectile dysfunction, Disp: 90 tablet, Rfl: 3  •  triamcinolone (KENALOG) 0.1 % cream, Apply topically 2 (two) times a day (Patient not taking: Reported on 10/28/2024), Disp: 30 g, Rfl: 0    Vitals: Blood pressure 132/64, pulse 69, height 6' 1\" (1.854 m), weight 79.4 kg (175 lb), SpO2 97%.    Body mass index is 23.09 kg/m².  Vitals:    12/24/24 1236   Weight: 79.4 kg (175 lb)     BP Readings from Last 3 Encounters:   12/24/24 132/64   12/19/24 136/70   12/04/24 126/70         Physical Exam  Constitutional:       General: He is not in acute distress.     Appearance: He is well-developed. He is not diaphoretic.   Neck:      Thyroid: No thyromegaly.      Vascular: No JVD.      Trachea: No tracheal deviation.   Cardiovascular:      Rate and Rhythm: Normal rate and regular rhythm.      Heart sounds: S1 normal and S2 normal. Heart sounds not distant. Murmur heard.      Systolic (ejection) murmur is present with a grade of 2/6.      No friction rub. No gallop. No S3 or S4 sounds.   Pulmonary:      Effort: Pulmonary effort is normal. No respiratory distress.      Breath sounds: Normal breath sounds. No wheezing or rales.   Chest:      Chest wall: No tenderness.   Abdominal:      General: Bowel sounds are normal. There is no distension.      Palpations: Abdomen is soft.      Tenderness: There is no abdominal tenderness.   Musculoskeletal:         General: No deformity.      Cervical back: Neck supple.   Skin:     General: Skin is warm and dry.      Coloration: Skin is not pale.      Findings: No rash.   Neurological:      Mental Status: He is alert and oriented to person, place, and time.   Psychiatric:         Behavior: Behavior normal.         Judgment: Judgment normal.       Diagnostic Studies Review Cardio:  Stress Test: Stress test in 2013 shows maximum elective exercise stress test patient achieved 86% of his bradycardic heart rate.     Repeat exercise stress test done 05/02/2018 was normal.  Patient " exercised for 10 minutes.    Patient has exercise stress test done on 5/16/2022.  He did 7 minutes of exercise and achieved a workload of 10 metabolic equivalents.  However he continued continue the stress test due to his leg pain.  Otherwise he has no issues.    Repeat nuclear stress test November 2024 shows no ischemia excessive GI uptake artifact was noted.     Echocardiogram/YOANA: Echo Doppler in 2013 shows normal LV systolic function grade 1 diastolic dysfunction and trace AI mild MR echo Doppler in January 2016 shows EF 55-60%, mild concentric left hypertrophy, mild AI, mild TR PA pressure 35 mm of mercury.      Vascular imaging: Carotid Doppler done in 2014 shows no significant carotid artery stenosis bilaterally.     Extended monitoring done on November 2020 for episode of short atrial runs which were asymptomatic patient already on beta-blocker.  He takes Coreg 12 per 5 in the morning and 6.2 5 in the evening.    ECG Report: EKG shows normal sinus rhythm heart rate 73 bpm this was done in April 2016. RSR pattern, probably normal [id number]/06/2017. Twelve lead EKG shows normal sinus rhythm heart rate 64 beats per minute incomplete RBBB.  Twelve lead EKG in our office shows normal sinus rhythm heart rate 68 beats per minute.  RSR pattern.  No change from old EKG.    Twelve lead EKG 10/29/2019 shows normal sinus rhythm heart rate 71 beats per minute.  No significant ST changes no change from old EKG.     Twelve lead EKG 10/01/2020 shows normal sinus rhythm heart rate 67 beats per minute no significant ST changes no change from old EKG.    Twelve lead EKG 12/15/2020 shows normal sinus rhythm left axis deviation.  Q-wave in inferior lead probably due to micro are no change from old EKG.      Twelve lead EKG 03/19/2021 shows normal sinus rhythm heart rate 70 beats per minute.    Twelve lead EKG 10/22/2021 shows normal sinus rhythm heart rate 73 beats per minute small Q in inferior lead most likely pseudo infarct  pattern no change from previous EKG.    Lead EKG 2022 shows normal sinus rhythm heart rate 69 beats per minute.  Left axis.  Q-wave noted in inferior lead most likely with pseudo infarct pattern.  No significant change from previous EKG.    Twelve-lead EKG 2023 shows sinus rhythm heart rate 58 bpm Q waves noted inferior lead with small R.  No change from previous EKG most likely pseudo infarct pattern.      Cardiac testing:   Results for orders placed during the hospital encounter of 17   Echo complete with contrast if indicated    Narrative [address] [address]  [phone]    Transthoracic Echocardiogram  2D, M-mode, Doppler, and Color Doppler    Study date:  2017    Patient: MARIETTA EUCEDA  MR number: [id number]  Account number: [id number]  : 1945  Age: 71 years  Gender: Male  Status: Routine  Location: Echo lab  Height: 72 in  Weight: 184.6 lb  BP: 118/ 64 mmHg    Indications: Murmur    Diagnoses: 785.2 - CARDIAC MURMURS NEC    Sonographer:  SUNNY Darby  Primary Physician:  Mynor Martinez DO  Referring Physician:  Andrei Small MD  [facility]  [facility]  Interpreting Physician:  Alisa Ha DO    SUMMARY    LEFT VENTRICLE:  Systolic function was normal by visual assessment. Ejection fraction was estimated in the range of 55 % to 60 %.  There were no regional wall motion abnormalities.  There was mild concentric hypertrophy.  Doppler parameters were consistent with abnormal left ventricular relaxation (grade 1 diastolic dysfunction).    MITRAL VALVE:  There was trace regurgitation.    AORTIC VALVE:  There was no evidence for stenosis.  There was mild to moderate regurgitation.    TRICUSPID VALVE:  There was mild to moderate regurgitation.  Pulmonary artery systolic pressure was within the normal range.    HISTORY: PRIOR HISTORY: HTN, Hyperlipidemia, Pancreatic Cancer    PROCEDURE: The procedure was performed in  the echo lab. This was a routine study. The transthoracic approach was used. The study included complete 2D imaging, M-mode, complete spectral Doppler, and color Doppler. The heart rate was 65 bpm,  at the start of the study. Image quality was adequate.    LEFT VENTRICLE: Size was normal. Systolic function was normal by visual assessment. Ejection fraction was estimated in the range of 55 % to 60 %. There were no regional wall motion abnormalities. There was mild concentric hypertrophy.  DOPPLER: Doppler parameters were consistent with abnormal left ventricular relaxation (grade 1 diastolic dysfunction).    RIGHT VENTRICLE: The size was normal. Systolic function was normal. DOPPLER: Systolic pressure was within the normal range.    LEFT ATRIUM: The atrium was mildly dilated.    RIGHT ATRIUM: Size was normal.    MITRAL VALVE: Valve structure was normal. There was normal leaflet separation. No echocardiographic evidence for prolapse. DOPPLER: The transmitral velocity was within the normal range. There was no evidence for stenosis. There was trace  regurgitation.    AORTIC VALVE: The valve was trileaflet. Leaflets exhibited normal cuspal separation and sclerosis. DOPPLER: Transaortic velocity was within the normal range. There was no evidence for stenosis. There was mild to moderate regurgitation.    TRICUSPID VALVE: The valve structure was normal. There was normal leaflet separation. DOPPLER: The transtricuspid velocity was within the normal range. There was mild to moderate regurgitation. Pulmonary artery systolic pressure was within  the normal range. Estimated peak PA pressure was 36 mmHg.    PULMONIC VALVE: Leaflets exhibited normal thickness, no calcification, and normal cuspal separation. DOPPLER: The transpulmonic velocity was within the normal range. There was no regurgitation.    PERICARDIUM: There was no thickening. There was no pericardial effusion.    AORTA: The root exhibited normal size.    PULMONARY  "ARTERY: The size was normal. The morphology appeared normal.    SYSTEM MEASUREMENT TABLES    2D mode  AoR Diam 2D: 3.5 cm  LA Diam (2D): 4.5 cm  LA/Ao (2D): 1.29  FS (2D Teich): 38.4 %  IVSd (2D): 1.04 cm  LVDEV: 84 cm³  LVESV: 26 cm³  LVIDd(2D): 4.32 cm  LVISd (2D): 2.66 cm  LVPWd (2D): 1.12 cm  SV (Teich): 58 cm³    Apical four chamber  LVEF A4C: 55 %    Apical two chamber  LA Area: 20.7 cm squared  LA Volume: 59 cm³    Unspecified Scan Mode  MV Peak A Cy: 970 mm/s  MV Peak E Cy. Mean: 831 mm/s  MVA (PHT): 3.44 cm squared  PHT: 64 ms  Max P mm[Hg]  V Max: 2470 mm/s  Vmax: 2560 mm/s  RA Area: 16.7 cm squared  RA Volume: 42.1 cm³  TAPSE: 2.7 cm    [employer] Accredited Echocardiography Laboratory    Prepared and electronically signed by    Alisa Ha DO  Signed 2017 11:38:55       Lab Review   Labs from 2019 reviewed    Dr. Andrei Small MD [facility]      \"This note has been constructed using a voice recognition system.Therefore there may be syntax, spelling, and/or grammatical errors. Please call if you have any questions. \"  "

## 2025-02-01 DIAGNOSIS — I10 ESSENTIAL HYPERTENSION: ICD-10-CM

## 2025-02-03 RX ORDER — CARVEDILOL 6.25 MG/1
12.5 TABLET ORAL 2 TIMES DAILY
Qty: 360 TABLET | Refills: 1 | Status: SHIPPED | OUTPATIENT
Start: 2025-02-03

## 2025-03-05 ENCOUNTER — APPOINTMENT (OUTPATIENT)
Dept: LAB | Facility: CLINIC | Age: 80
End: 2025-03-05
Payer: COMMERCIAL

## 2025-03-05 ENCOUNTER — APPOINTMENT (OUTPATIENT)
Dept: LAB | Facility: HOSPITAL | Age: 80
End: 2025-03-05
Attending: UROLOGY
Payer: COMMERCIAL

## 2025-03-05 ENCOUNTER — OFFICE VISIT (OUTPATIENT)
Dept: LAB | Facility: HOSPITAL | Age: 80
End: 2025-03-05
Payer: COMMERCIAL

## 2025-03-05 DIAGNOSIS — R39.11 BENIGN PROSTATIC HYPERPLASIA WITH URINARY HESITANCY: ICD-10-CM

## 2025-03-05 DIAGNOSIS — Z01.810 PRE-OPERATIVE CARDIOVASCULAR EXAMINATION: ICD-10-CM

## 2025-03-05 DIAGNOSIS — Z01.812 PRE-OPERATIVE LABORATORY EXAMINATION: ICD-10-CM

## 2025-03-05 DIAGNOSIS — R39.89 SUSPECTED UTI: ICD-10-CM

## 2025-03-05 DIAGNOSIS — N40.1 BENIGN PROSTATIC HYPERPLASIA WITH URINARY HESITANCY: ICD-10-CM

## 2025-03-05 LAB
ANION GAP SERPL CALCULATED.3IONS-SCNC: 7 MMOL/L (ref 4–13)
ATRIAL RATE: 64 BPM
BASOPHILS # BLD AUTO: 0.06 THOUSANDS/ÂΜL (ref 0–0.1)
BASOPHILS NFR BLD AUTO: 1 % (ref 0–1)
BUN SERPL-MCNC: 17 MG/DL (ref 5–25)
CALCIUM SERPL-MCNC: 8.9 MG/DL (ref 8.4–10.2)
CHLORIDE SERPL-SCNC: 103 MMOL/L (ref 96–108)
CO2 SERPL-SCNC: 26 MMOL/L (ref 21–32)
CREAT SERPL-MCNC: 0.78 MG/DL (ref 0.6–1.3)
EOSINOPHIL # BLD AUTO: 0.21 THOUSAND/ÂΜL (ref 0–0.61)
EOSINOPHIL NFR BLD AUTO: 4 % (ref 0–6)
ERYTHROCYTE [DISTWIDTH] IN BLOOD BY AUTOMATED COUNT: 13 % (ref 11.6–15.1)
GFR SERPL CREATININE-BSD FRML MDRD: 87 ML/MIN/1.73SQ M
GLUCOSE P FAST SERPL-MCNC: 105 MG/DL (ref 65–99)
HCT VFR BLD AUTO: 45 % (ref 36.5–49.3)
HGB BLD-MCNC: 14.7 G/DL (ref 12–17)
IMM GRANULOCYTES # BLD AUTO: 0.01 THOUSAND/UL (ref 0–0.2)
IMM GRANULOCYTES NFR BLD AUTO: 0 % (ref 0–2)
LYMPHOCYTES # BLD AUTO: 1.1 THOUSANDS/ÂΜL (ref 0.6–4.47)
LYMPHOCYTES NFR BLD AUTO: 22 % (ref 14–44)
MCH RBC QN AUTO: 31.1 PG (ref 26.8–34.3)
MCHC RBC AUTO-ENTMCNC: 32.7 G/DL (ref 31.4–37.4)
MCV RBC AUTO: 95 FL (ref 82–98)
MONOCYTES # BLD AUTO: 0.93 THOUSAND/ÂΜL (ref 0.17–1.22)
MONOCYTES NFR BLD AUTO: 18 % (ref 4–12)
NEUTROPHILS # BLD AUTO: 2.78 THOUSANDS/ÂΜL (ref 1.85–7.62)
NEUTS SEG NFR BLD AUTO: 55 % (ref 43–75)
NRBC BLD AUTO-RTO: 0 /100 WBCS
P AXIS: 33 DEGREES
PLATELET # BLD AUTO: 270 THOUSANDS/UL (ref 149–390)
PMV BLD AUTO: 10.5 FL (ref 8.9–12.7)
POTASSIUM SERPL-SCNC: 4.3 MMOL/L (ref 3.5–5.3)
PR INTERVAL: 166 MS
QRS AXIS: -24 DEGREES
QRSD INTERVAL: 92 MS
QT INTERVAL: 418 MS
QTC INTERVAL: 431 MS
RBC # BLD AUTO: 4.72 MILLION/UL (ref 3.88–5.62)
SODIUM SERPL-SCNC: 136 MMOL/L (ref 135–147)
T WAVE AXIS: 21 DEGREES
VENTRICULAR RATE: 64 BPM
WBC # BLD AUTO: 5.09 THOUSAND/UL (ref 4.31–10.16)

## 2025-03-05 PROCEDURE — 86900 BLOOD TYPING SEROLOGIC ABO: CPT

## 2025-03-05 PROCEDURE — 86850 RBC ANTIBODY SCREEN: CPT

## 2025-03-05 PROCEDURE — 93005 ELECTROCARDIOGRAM TRACING: CPT

## 2025-03-05 PROCEDURE — 93010 ELECTROCARDIOGRAM REPORT: CPT | Performed by: INTERNAL MEDICINE

## 2025-03-05 PROCEDURE — 87086 URINE CULTURE/COLONY COUNT: CPT

## 2025-03-05 PROCEDURE — 36415 COLL VENOUS BLD VENIPUNCTURE: CPT

## 2025-03-05 PROCEDURE — 85025 COMPLETE CBC W/AUTO DIFF WBC: CPT

## 2025-03-05 PROCEDURE — 86901 BLOOD TYPING SEROLOGIC RH(D): CPT

## 2025-03-05 PROCEDURE — 80048 BASIC METABOLIC PNL TOTAL CA: CPT

## 2025-03-06 LAB
ABO GROUP BLD: NORMAL
BACTERIA UR CULT: NORMAL
BLD GP AB SCN SERPL QL: NEGATIVE
RH BLD: POSITIVE
SPECIMEN EXPIRATION DATE: NORMAL

## 2025-03-14 NOTE — PRE-PROCEDURE INSTRUCTIONS
Pre-Surgery Instructions:   Medication Instructions    alfuzosin (UROXATRAL) 10 mg 24 hr tablet Take day of surgery.    amLODIPine (NORVASC) 10 mg tablet Take day of surgery.    Ascorbic Acid (vitamin C) 1000 MG tablet Stop taking 4 days prior to surgery.    aspirin 81 mg chewable tablet Pt already holding- last dose was  3/12    carvedilol (COREG) 6.25 mg tablet Take day of surgery.    cholecalciferol (VITAMIN D3) 1,000 units tablet Stop taking 4 days prior to surgery.    rosuvastatin (CRESTOR) 5 mg tablet Take day of surgery.    spironolactone-hydrochlorothiazide (ALDACTAZIDE) 25-25 mg per tablet Hold day of surgery.    tadalafil (CIALIS) 5 MG tablet Hold day of surgery.   Doxycyline- continue as prescribed. Pt is waiting to hear back from surgeon's office for further instructions.    Medication instructions for day of surgery reviewed. Please take all instructed medications with only a sip of water.       You will receive a call one business day prior to surgery with an arrival time and hospital directions. If your surgery is scheduled on a Monday, the hospital will be calling you on the Friday prior to your surgery. If you have not heard from anyone by 8pm, please call the hospital supervisor through the hospital  at [phone]. ([place] [phone] or [place] [phone]).    Do not eat or drink anything after midnight the night before your surgery, including candy, mints, lifesavers, or chewing gum. Do not drink alcohol 24hrs before your surgery. Try not to smoke at least 24hrs before your surgery.       Follow the pre surgery showering instructions as listed in the “My Surgical Experience Booklet” or otherwise provided by your surgeon's office. Do not use a blade to shave the surgical area 1 week before surgery. It is okay to use a clean electric clippers up to 24 hours before surgery. Do not apply any lotions, creams, including makeup, cologne, deodorant, or perfumes after showering on the day  of your surgery. Do not use dry shampoo, hair spray, hair gel, or any type of hair products.     No contact lenses, eye make-up, or artificial eyelashes. Remove nail polish, including gel polish, and any artificial, gel, or acrylic nails if possible. Remove all jewelry including rings and body piercing jewelry.     Wear causal clothing that is easy to take on and off. Consider your type of surgery.    Keep any valuables, jewelry, piercings at home. Please bring any specially ordered equipment (sling, braces) if indicated.    Arrange for a responsible person to drive you to and from the hospital on the day of your surgery. Please confirm the visitor policy for the day of your procedure when you receive your phone call with an arrival time.     Call the surgeon's office with any new illnesses, exposures, or additional questions prior to surgery.    Please reference your “My Surgical Experience Booklet” for additional information to prepare for your upcoming surgery.

## 2025-03-18 ENCOUNTER — ANESTHESIA EVENT (OUTPATIENT)
Dept: PERIOP | Facility: HOSPITAL | Age: 80
End: 2025-03-18
Payer: COMMERCIAL

## 2025-03-19 ENCOUNTER — HOSPITAL ENCOUNTER (OUTPATIENT)
Facility: HOSPITAL | Age: 80
Setting detail: OUTPATIENT SURGERY
Discharge: STILL ADMITTED OR ADMINISTRATIVE | End: 2025-03-19
Attending: UROLOGY | Admitting: UROLOGY
Payer: COMMERCIAL

## 2025-03-19 ENCOUNTER — TELEPHONE (OUTPATIENT)
Age: 80
End: 2025-03-19

## 2025-03-19 ENCOUNTER — ANESTHESIA (OUTPATIENT)
Dept: PERIOP | Facility: HOSPITAL | Age: 80
End: 2025-03-19
Payer: COMMERCIAL

## 2025-03-19 VITALS
RESPIRATION RATE: 16 BRPM | BODY MASS INDEX: 24.34 KG/M2 | WEIGHT: 179.68 LBS | OXYGEN SATURATION: 95 % | DIASTOLIC BLOOD PRESSURE: 65 MMHG | SYSTOLIC BLOOD PRESSURE: 119 MMHG | HEIGHT: 72 IN | TEMPERATURE: 97.1 F | HEART RATE: 60 BPM

## 2025-03-19 DIAGNOSIS — N40.1 BENIGN PROSTATIC HYPERPLASIA WITH URINARY HESITANCY: ICD-10-CM

## 2025-03-19 DIAGNOSIS — R39.11 BENIGN PROSTATIC HYPERPLASIA WITH URINARY HESITANCY: ICD-10-CM

## 2025-03-19 LAB
ABO GROUP BLD: NORMAL
ANION GAP SERPL CALCULATED.3IONS-SCNC: 11 MMOL/L (ref 4–13)
BILIRUB UR QL STRIP: NEGATIVE
BUN SERPL-MCNC: 17 MG/DL (ref 5–25)
CALCIUM SERPL-MCNC: 8.8 MG/DL (ref 8.4–10.2)
CHLORIDE SERPL-SCNC: 106 MMOL/L (ref 96–108)
CLARITY UR: CLEAR
CO2 SERPL-SCNC: 21 MMOL/L (ref 21–32)
COLOR UR: YELLOW
CREAT SERPL-MCNC: 0.73 MG/DL (ref 0.6–1.3)
ERYTHROCYTE [DISTWIDTH] IN BLOOD BY AUTOMATED COUNT: 13 % (ref 11.6–15.1)
GFR SERPL CREATININE-BSD FRML MDRD: 89 ML/MIN/1.73SQ M
GLUCOSE P FAST SERPL-MCNC: 119 MG/DL (ref 65–99)
GLUCOSE SERPL-MCNC: 119 MG/DL (ref 65–140)
GLUCOSE UR STRIP-MCNC: NEGATIVE MG/DL
HCT VFR BLD AUTO: 43.1 % (ref 36.5–49.3)
HGB BLD-MCNC: 15 G/DL (ref 12–17)
HGB UR QL STRIP.AUTO: NEGATIVE
KETONES UR STRIP-MCNC: ABNORMAL MG/DL
LEUKOCYTE ESTERASE UR QL STRIP: NEGATIVE
MCH RBC QN AUTO: 31.6 PG (ref 26.8–34.3)
MCHC RBC AUTO-ENTMCNC: 34.8 G/DL (ref 31.4–37.4)
MCV RBC AUTO: 91 FL (ref 82–98)
NITRITE UR QL STRIP: NEGATIVE
PH UR STRIP.AUTO: 6 [PH]
PLATELET # BLD AUTO: 259 THOUSANDS/UL (ref 149–390)
PMV BLD AUTO: 9.8 FL (ref 8.9–12.7)
POTASSIUM SERPL-SCNC: 3.7 MMOL/L (ref 3.5–5.3)
PROT UR STRIP-MCNC: NEGATIVE MG/DL
RBC # BLD AUTO: 4.75 MILLION/UL (ref 3.88–5.62)
RH BLD: POSITIVE
SODIUM SERPL-SCNC: 138 MMOL/L (ref 135–147)
SP GR UR STRIP.AUTO: 1.02 (ref 1–1.03)
UROBILINOGEN UR STRIP-ACNC: <2 MG/DL
WBC # BLD AUTO: 4.51 THOUSAND/UL (ref 4.31–10.16)

## 2025-03-19 PROCEDURE — 88305 TISSUE EXAM BY PATHOLOGIST: CPT | Performed by: PATHOLOGY

## 2025-03-19 PROCEDURE — 81003 URINALYSIS AUTO W/O SCOPE: CPT | Performed by: UROLOGY

## 2025-03-19 PROCEDURE — 52601 PROSTATECTOMY (TURP): CPT | Performed by: UROLOGY

## 2025-03-19 PROCEDURE — 80048 BASIC METABOLIC PNL TOTAL CA: CPT | Performed by: UROLOGY

## 2025-03-19 PROCEDURE — NC001 PR NO CHARGE: Performed by: UROLOGY

## 2025-03-19 PROCEDURE — 85027 COMPLETE CBC AUTOMATED: CPT | Performed by: UROLOGY

## 2025-03-19 RX ORDER — CEFAZOLIN SODIUM 2 G/50ML
2000 SOLUTION INTRAVENOUS ONCE
Status: COMPLETED | OUTPATIENT
Start: 2025-03-19 | End: 2025-03-19

## 2025-03-19 RX ORDER — ONDANSETRON 2 MG/ML
INJECTION INTRAMUSCULAR; INTRAVENOUS AS NEEDED
Status: DISCONTINUED | OUTPATIENT
Start: 2025-03-19 | End: 2025-03-19

## 2025-03-19 RX ORDER — MAGNESIUM HYDROXIDE 1200 MG/15ML
LIQUID ORAL AS NEEDED
Status: DISCONTINUED | OUTPATIENT
Start: 2025-03-19 | End: 2025-03-19 | Stop reason: HOSPADM

## 2025-03-19 RX ORDER — FENTANYL CITRATE 50 UG/ML
INJECTION, SOLUTION INTRAMUSCULAR; INTRAVENOUS AS NEEDED
Status: DISCONTINUED | OUTPATIENT
Start: 2025-03-19 | End: 2025-03-19

## 2025-03-19 RX ORDER — PROPOFOL 10 MG/ML
INJECTION, EMULSION INTRAVENOUS AS NEEDED
Status: DISCONTINUED | OUTPATIENT
Start: 2025-03-19 | End: 2025-03-19

## 2025-03-19 RX ORDER — DEXAMETHASONE SODIUM PHOSPHATE 10 MG/ML
INJECTION, SOLUTION INTRAMUSCULAR; INTRAVENOUS AS NEEDED
Status: DISCONTINUED | OUTPATIENT
Start: 2025-03-19 | End: 2025-03-19

## 2025-03-19 RX ORDER — MIDAZOLAM HYDROCHLORIDE 2 MG/2ML
INJECTION, SOLUTION INTRAMUSCULAR; INTRAVENOUS AS NEEDED
Status: DISCONTINUED | OUTPATIENT
Start: 2025-03-19 | End: 2025-03-19

## 2025-03-19 RX ORDER — SODIUM CHLORIDE, SODIUM LACTATE, POTASSIUM CHLORIDE, CALCIUM CHLORIDE 600; 310; 30; 20 MG/100ML; MG/100ML; MG/100ML; MG/100ML
100 INJECTION, SOLUTION INTRAVENOUS CONTINUOUS
Status: DISCONTINUED | OUTPATIENT
Start: 2025-03-19 | End: 2025-03-21 | Stop reason: HOSPADM

## 2025-03-19 RX ORDER — LIDOCAINE HYDROCHLORIDE 10 MG/ML
INJECTION, SOLUTION EPIDURAL; INFILTRATION; INTRACAUDAL; PERINEURAL AS NEEDED
Status: DISCONTINUED | OUTPATIENT
Start: 2025-03-19 | End: 2025-03-19

## 2025-03-19 RX ORDER — FENTANYL CITRATE/PF 50 MCG/ML
50 SYRINGE (ML) INJECTION
Status: DISCONTINUED | OUTPATIENT
Start: 2025-03-19 | End: 2025-03-21 | Stop reason: HOSPADM

## 2025-03-19 RX ORDER — EPHEDRINE SULFATE 50 MG/ML
INJECTION INTRAVENOUS AS NEEDED
Status: DISCONTINUED | OUTPATIENT
Start: 2025-03-19 | End: 2025-03-19

## 2025-03-19 RX ORDER — ONDANSETRON 2 MG/ML
4 INJECTION INTRAMUSCULAR; INTRAVENOUS ONCE AS NEEDED
Status: DISCONTINUED | OUTPATIENT
Start: 2025-03-19 | End: 2025-03-21 | Stop reason: HOSPADM

## 2025-03-19 RX ORDER — SODIUM CHLORIDE, SODIUM LACTATE, POTASSIUM CHLORIDE, CALCIUM CHLORIDE 600; 310; 30; 20 MG/100ML; MG/100ML; MG/100ML; MG/100ML
125 INJECTION, SOLUTION INTRAVENOUS CONTINUOUS
Status: DISCONTINUED | OUTPATIENT
Start: 2025-03-19 | End: 2025-03-21 | Stop reason: HOSPADM

## 2025-03-19 RX ADMIN — ONDANSETRON 4 MG: 2 INJECTION INTRAMUSCULAR; INTRAVENOUS at 07:42

## 2025-03-19 RX ADMIN — SODIUM CHLORIDE, SODIUM LACTATE, POTASSIUM CHLORIDE, AND CALCIUM CHLORIDE: .6; .31; .03; .02 INJECTION, SOLUTION INTRAVENOUS at 07:51

## 2025-03-19 RX ADMIN — DEXAMETHASONE SODIUM PHOSPHATE 10 MG: 10 INJECTION, SOLUTION INTRAMUSCULAR; INTRAVENOUS at 07:42

## 2025-03-19 RX ADMIN — SODIUM CHLORIDE, SODIUM LACTATE, POTASSIUM CHLORIDE, AND CALCIUM CHLORIDE: .6; .31; .03; .02 INJECTION, SOLUTION INTRAVENOUS at 07:27

## 2025-03-19 RX ADMIN — PROPOFOL 100 MG: 10 INJECTION, EMULSION INTRAVENOUS at 07:37

## 2025-03-19 RX ADMIN — MIDAZOLAM 1 MG: 1 INJECTION INTRAMUSCULAR; INTRAVENOUS at 07:29

## 2025-03-19 RX ADMIN — FENTANYL CITRATE 50 MCG: 50 INJECTION, SOLUTION INTRAMUSCULAR; INTRAVENOUS at 07:55

## 2025-03-19 RX ADMIN — PROPOFOL 50 MG: 10 INJECTION, EMULSION INTRAVENOUS at 07:38

## 2025-03-19 RX ADMIN — LIDOCAINE HYDROCHLORIDE 50 MG: 10 INJECTION, SOLUTION EPIDURAL; INFILTRATION; INTRACAUDAL; PERINEURAL at 07:37

## 2025-03-19 RX ADMIN — EPHEDRINE SULFATE 5 MG: 50 INJECTION, SOLUTION INTRAVENOUS at 08:36

## 2025-03-19 RX ADMIN — PROPOFOL 50 MG: 10 INJECTION, EMULSION INTRAVENOUS at 07:40

## 2025-03-19 RX ADMIN — EPHEDRINE SULFATE 5 MG: 50 INJECTION, SOLUTION INTRAVENOUS at 08:40

## 2025-03-19 RX ADMIN — CEFAZOLIN SODIUM 2000 MG: 2 SOLUTION INTRAVENOUS at 07:34

## 2025-03-19 RX ADMIN — FENTANYL CITRATE 50 MCG: 50 INJECTION, SOLUTION INTRAMUSCULAR; INTRAVENOUS at 07:43

## 2025-03-19 RX ADMIN — MIDAZOLAM 1 MG: 1 INJECTION INTRAMUSCULAR; INTRAVENOUS at 07:36

## 2025-03-19 RX ADMIN — EPHEDRINE SULFATE 10 MG: 50 INJECTION, SOLUTION INTRAVENOUS at 08:43

## 2025-03-19 RX ADMIN — EPHEDRINE SULFATE 5 MG: 50 INJECTION, SOLUTION INTRAVENOUS at 08:08

## 2025-03-19 NOTE — PROGRESS NOTES
Patient alert and awake,  vital signs WNL. Patient transferred to APU via stretcher with Bran. Bedside report given to MARSHAL Gore. Stretcher in lowest position and locked, side rails up, call bell within reach.

## 2025-03-19 NOTE — ANESTHESIA POSTPROCEDURE EVALUATION
Post-Op Assessment Note    CV Status:  Stable  Pain Score: 1    Pain management: adequate       Mental Status:  Alert and awake   Hydration Status:  Euvolemic and stable   PONV Controlled:  Controlled   Airway Patency:  Patent     Post Op Vitals Reviewed: Yes    No anethesia notable event occurred.    Staff: Anesthesiologist           Last Filed PACU Vitals:  Vitals Value Taken Time   Temp 97.1 °F (36.2 °C) 03/19/25 0920   Pulse 62 03/19/25 1005   /65 03/19/25 1005   Resp 16 03/19/25 1005   SpO2 95 % 03/19/25 1005       Modified Lina:     Vitals Value Taken Time   Activity 2 03/19/25 0920   Respiration 2 03/19/25 0920   Circulation 2 03/19/25 0920   Consciousness 2 03/19/25 0920   Oxygen Saturation 2 03/19/25 0920     Modified Lina Score: 10

## 2025-03-19 NOTE — PERIOPERATIVE NURSING NOTE
Patient received from PACU, alert and awake. Wife at bed side. PO fluids offered. Vitals stable, no c/o pain a this time. Patient has duran's catheter. IV line is running.  Bed is locked and in lowest position. Side rails up. Call bell within reach. Plan of care in progress.

## 2025-03-19 NOTE — PROGRESS NOTES
Patient received from PACU RN. Alert and oriented sitting upright on stretcher. Vitals WNL, denies any pain at this time. Refreshments provided, family at bedside. Stretcher low and locked with bilateral side rails up. Call bell within reach, will continue to monitor.

## 2025-03-19 NOTE — TELEPHONE ENCOUNTER
Calling with complaints of blood in the urine, his duran bag is filling but concerned for bright red blood, no clots    Discussed to increase water to 64 ounces daily, decrease bladder irritants. Discussed stopping to drink 1-2 hours before bed and empty bag before bed. He will call back if worsens or any other concerns.    Patient asking if he can have a NV tomorrow to remove duran, please advise       #[phone]

## 2025-03-19 NOTE — PERIOPERATIVE NURSING NOTE
AVS reviewed with patient and his wife at bed side and all concerns were answered. IV line is taken out. Patient tolerated PO fluids well. Denies any pain at this time. Manzo's bag is draining well. Extra leg bag with instructions how to use is given .All belongings were sent with patient. Patient left floor with firm understanding of discharge instructions. Patient escorted to his ride via wheelchair by this nurse.

## 2025-03-19 NOTE — ANESTHESIA PREPROCEDURE EVALUATION
Procedure:  TRANSURETHRAL RESECTION OF PROSTATE (TURP) (Abdomen)    Relevant Problems   ANESTHESIA (within normal limits)      CARDIO   (+) Cardiac murmur   (+) Essential hypertension   (+) Hypercholesterolemia      ENDO (within normal limits)      GI/HEPATIC   (+) Malignant neoplasm of pancreas, unspecified location of malignancy (HCC)      /RENAL   (+) Benign prostatic hyperplasia with urinary obstruction      PULMONARY (within normal limits)        Physical Exam    Airway    Mallampati score: III  TM Distance: <3 FB  Neck ROM: full     Dental   No notable dental hx     Cardiovascular  Rhythm: regular, Rate: normal    Pulmonary   Breath sounds clear to auscultation    Other Findings        Anesthesia Plan  ASA Score- 2     Anesthesia Type- general with ASA Monitors.         Additional Monitors:     Airway Plan: LMA.           Plan Factors-Exercise tolerance (METS): >4 METS.    Chart reviewed. EKG reviewed.  Existing labs reviewed. Patient summary reviewed.    Patient is not a current smoker.              Induction- intravenous.    Postoperative Plan- Plan for postoperative opioid use. Planned trial extubation        Informed Consent- Anesthetic plan and risks discussed with patient.  I personally reviewed this patient with the CRNA. Discussed and agreed on the Anesthesia Plan with the CRNA..      NPO Status:  Vitals Value Taken Time   Date of last liquid 03/19/25 03/19/25 0630   Time of last liquid 0530 03/19/25 0630   Date of last solid 03/18/25 03/19/25 0630   Time of last solid 2200 03/19/25 0630

## 2025-03-19 NOTE — TELEPHONE ENCOUNTER
Call placed to patient, informed and discussed Jc's message above, verbalized understanding    Confirmed TOV tomorrow

## 2025-03-19 NOTE — H&P
History & Physical - [facility]  Kevin Schmitz 79 y.o. male MRN: [id number]  Unit/Bed#: OR POOL Encounter: [id number]    ASSESSMENT  BPH    PLAN:  TURP    HISTORY OF PRESENT ILLNESS:  79-year-old male with obstructive BPH developed gynecomastia on finasteride.  Subsequently was changed to tamsulosin.  Patient was evaluated at [facility] urology and was noted to have  cc for which cystoscopy with transrectal ultrasound was suggested.  Currently presents to establish care at [facility] urology.  Patient today was unable to void but was bladder scanned for 314 mL. Urinary frequency, urgency, nocturia, and weaker stream has been increasing for the past couple months.  No change in medication.    Findings: Uroflow PVR peak 9 mL/s average 6 mL/s typical for him.   mL.  Voided volume 176 mL.  Cystoscopy shows mild bladder trabeculation.  No stones or tumors.  No strictures.  Prostate 2-1/2 cm lateral lobe obstruction.  Median lobe pushing into the bladder neck 1-1/2 cm.  Flap valve.  Bladder drained 750 mL via the scope.     Plan: Patient's brother had UroLift.  Not satisfied.  Patient prefers procedure to remove tissue.  Discussed bipolar TURP and its risks and benefits.  Refuses to do self cath.  Refuses to add finasteride.  Had breast enlargement from prior use.  Is away in January and February for the winter in [place].  Prefers to do bipolar TURP when returns in March.  Otherwise healthy does not need medical clearance.  Aware that we will have to hold his aspirin for a week prior.  Continue alfuzosin for now.    PAST MEDICAL HISTORY:  Past Medical History:   Diagnosis Date    BEP (benign enlargement of prostate)     Cancer (HCC)     pancreatic   whipple procedure  basal cell    Cardiac murmur     Colon polyp     DVT (deep venous thrombosis) (HCC)     left leg    Hyperlipidemia     Hypertension     Positive colorectal cancer screening using Cologuard test 01/2021    PVC (premature ventricular  contraction) 1990's    hx of, most recent stress test 2018 - normal results per pt    Right bundle branch block        PAST SURGICAL HISTORY:  Past Surgical History:   Procedure Laterality Date    APPENDECTOMY      COLONOSCOPY      HERNIA REPAIR      right inguinal x2 and left inguinal x1    PANCREAS SURGERY      SPLENECTOMY, TOTAL      WHIPPLE PROCEDURE/PANCREATICO-DUODENECTOMY         ALLERGIES:  No Known Allergies    SOCIAL HISTORY:  Social History     Substance and Sexual Activity   Alcohol Use Yes    Alcohol/week: 10.0 standard drinks of alcohol    Types: 10 Cans of beer per week     Social History     Substance and Sexual Activity   Drug Use No     Social History     Tobacco Use   Smoking Status Never   Smokeless Tobacco Never       FAMILY HISTORY:  Family History   Problem Relation Age of Onset    Hypertension Mother     Hypertension Father     Cancer Father     Cancer Daughter        MEDICATIONS:    Current Facility-Administered Medications:     ceFAZolin (ANCEF) IVPB (premix in dextrose) 2,000 mg 50 mL, 2,000 mg, Intravenous, Once, Arturo Aguila MD    lactated ringers infusion, 100 mL/hr, Intravenous, Continuous, Harriett Marin MD    lactated ringers infusion, 125 mL/hr, Intravenous, Continuous, Arturo Aguila MD    Review of Systems   Constitutional:  Negative for chills and fever.   HENT:  Negative for ear pain and sore throat.    Eyes:  Negative for pain and visual disturbance.   Respiratory:  Negative for cough and shortness of breath.    Cardiovascular:  Negative for chest pain and palpitations.   Gastrointestinal:  Negative for abdominal pain and vomiting.   Genitourinary:  Negative for dysuria and hematuria.   Musculoskeletal:  Negative for arthralgias and back pain.   Skin:  Negative for color change and rash.   Neurological:  Negative for seizures and syncope.   All other systems reviewed and are negative.      PHYSICAL EXAM:  Physical Exam  Constitutional:       General: He is not in acute  "distress.     Appearance: He is well-developed. He is not diaphoretic.   HENT:      Head: Normocephalic and atraumatic.      Mouth/Throat:      Pharynx: No oropharyngeal exudate.   Eyes:      General: No scleral icterus.  Neck:      Vascular: No JVD.   Cardiovascular:      Rate and Rhythm: Normal rate and regular rhythm.      Heart sounds: No murmur heard.  Pulmonary:      Effort: Pulmonary effort is normal. No respiratory distress.      Breath sounds: Normal breath sounds. No wheezing.   Abdominal:      General: There is no distension.      Palpations: Abdomen is soft.      Tenderness: There is no abdominal tenderness. There is no rebound.   Genitourinary:     Penis: Normal and circumcised. No phimosis, paraphimosis, hypospadias, erythema, tenderness or discharge.       Testes: Normal.         Right: Mass or tenderness not present.         Left: Mass or tenderness not present.      Prostate: Normal.   Musculoskeletal:      Cervical back: Normal range of motion and neck supple.   Skin:     General: Skin is warm and dry.   Neurological:      Mental Status: He is alert and oriented to person, place, and time.   Psychiatric:         Behavior: Behavior normal.         LAB RESULTS:  Lab Results   Component Value Date    WBC 4.51 03/19/2025    HGB 15.0 03/19/2025    HCT 43.1 03/19/2025    MCV 91 03/19/2025     03/19/2025     Lab Results   Component Value Date    SODIUM 138 03/19/2025    K 3.7 03/19/2025     03/19/2025    CO2 21 03/19/2025    BUN 17 03/19/2025    CREATININE 0.73 03/19/2025    GLUC 119 03/19/2025    CALCIUM 8.8 03/19/2025     Lab Results   Component Value Date    CALCIUM 8.8 03/19/2025     Lab Results   Component Value Date    PSA 7.575 (H) 12/06/2024     Arturo Aguila MD  03/19/25    Portions of the record may have been created with voice recognition software.  Occasional wrong word or \"sound alike\" substitutions may have occurred due to the inherent limitations of voice recognition " software.  Read the chart carefully and recognize, using context, where substitutions have occurred.

## 2025-03-19 NOTE — OP NOTE
OPERATIVE REPORT- Dr. Aguila  PATIENT NAME: Kevin Schmitz    :  1945  MRN: [id number]  Pt Location: [facility] OR ROOM 04    SURGERY DATE: 3/19/2025    Surgeon: Arturo Aguila MD    Pre-op Diagnosis:  BPH with obstruction    Post-op Diagnosis:  Same    Procedure:  Transurethral resection of prostate, bipolar.    Specimen(s):   ID Type Source Tests Collected by Time Destination   1 : send chips in formalin Tissue Prostate TISSUE EXAM Arturo Aguila MD 3/19/2025 0904        Estimated Blood Loss: Minimal    Complications: None    Drains:  22 Fr 3 way duran with continuous irrigation of normal saline    Anesthesia type:   General    Indications for surgery:  Urinary frequency and nocturia secondary to enlarged prostate    Findings:  Significantly enlarged prostate.  Probably 60 g gland.    Procedure and Technique:   After obtaining consent and identifying the patient, antibiotics were given as ordered and the patient was brought to the room.  All appropriate leads and monitors were placed and the patient was appropriately positioned on the table.  Anesthesia was administered and the patient was sterilely prepped and draped.  A timeout was performed where the patient name, , procedure, antibiotics, allergies, etc were discussed.  All in the room were satisfied before the start of the operative procedure.  What follows are the operative findings and events.    The 26 Bhutanese resectoscope sheath with continuous flow was introduced with the direct vision obturator. Continuous flow of saline was used during the procedure. Inspection of the bladder revealed no significant findings. Resection was initiated at the 6:00 position working toward the verumontanum with a bipolar TUR loop. Care was taken not to injure the ejaculatory ducts or the external sphincter. Thereafter the left prostate lobe was resected starting at the 1:00 position at the bladder neck working clockwise around to the 6:00 position out toward  "the verumontanum. Any localized bleeding was controlled with electrocautery. A similar process was then completed on the patient's right side starting at 11:00 working counterclockwise to 6:00. Finally the anterior aspect of the prostate was resected from the bladder neck carefully out towards the external sphincter, frequently checking to make sure that I was proximal to the sphincter. Again hemostasis was obtained with electrocautery. Prostate chips were irrigated free from the bladder. Repeat evaluation of hemostasis was performed and electrocautery was then used for final hemostasis. The bladder was reinspected with no significant findings noted. The ureteral orifices were visualized and uninjured during the procedure. No chips remained in the bladder. Therefore the bladder was filled and the resectoscope was withdrawn. Immediately a 22 Ethiopian three-way catheter was placed, the balloon inflated and hand irrigation performed to confirm position and ease of irrigation. Once this was completed a continuous irrigation of normal saline was established. He was awakened from anesthesia having tolerated the procedure well, and transferred to the recovery room in satisfactory condition.     Plan:  He will be observed in recovery and if irrigation clears he will be discharged to home. He can then do a voiding trial if the urine is clear tomorrow.       SIGNATURE: Arturo Aguila MD  DATE: March 19, 2025  TIME: 9:21 AM    Portions of the record may have been created with voice recognition software.  Occasional wrong word or \"sound alike\" substitutions may have occurred due to the inherent limitations of voice recognition software.  Read the chart carefully and recognize, using context, where substitutions have occurred.  "

## 2025-03-19 NOTE — ANESTHESIA POSTPROCEDURE EVALUATION
Post-Op Assessment Note    CV Status:  Stable  Pain Score: 0    Pain management: adequate       Mental Status:  Awake   Hydration Status:  Stable   PONV Controlled:  None   Airway Patency:  Patent  Two or more mitigation strategies used for obstructive sleep apnea   Post Op Vitals Reviewed: Yes    No anethesia notable event occurred.    Staff: CRNA   Comments: spontaneously breathing, HOB @ 30 degrees, simple mask to O2, fully endorsed to recovery w/o AC          Last Filed PACU Vitals:  Vitals Value Taken Time   Temp 97.1 °F (36.2 °C) 03/19/25 0920   Pulse 72    /55    Resp 12    SpO2 99

## 2025-03-20 ENCOUNTER — OFFICE VISIT (OUTPATIENT)
Dept: UROLOGY | Facility: CLINIC | Age: 80
End: 2025-03-20
Payer: COMMERCIAL

## 2025-03-20 DIAGNOSIS — R33.9 INCOMPLETE BLADDER EMPTYING: Primary | ICD-10-CM

## 2025-03-20 LAB — POST-VOID RESIDUAL VOLUME, ML POC: 160 ML

## 2025-03-20 PROCEDURE — 51798 US URINE CAPACITY MEASURE: CPT | Performed by: UROLOGY

## 2025-03-20 PROCEDURE — 99024 POSTOP FOLLOW-UP VISIT: CPT | Performed by: UROLOGY

## 2025-03-20 NOTE — PROGRESS NOTES
3/20/2025    Kevin Andersoncharlie  1945  [id number]    Diagnosis      Patient presents for tov managed by Dr. Aguila    Plan  Advised pt to call office with any dark maroon urine, fevers, severe pain, inability to urinate.     Procedure Manzo removal/voiding trial    Manzo catheter removed by Dr. Aguila this morning.     Patient returned this afternoon. Patient states able to void. Bladder ultrasound performed and PVR measured 160ml.    Reviewed normal expectations after surgery and when to call the office. Pt verbalized understanding. Pt currently c/o urgency and frequency. Encouraged hydration. Advised pt to call office if he cannot urinate or feels as if he cannot empty his bladder. Also reviwed ways to prevent constipation.     Recent Results (from the past 4 hours)   POCT Measure PVR    Collection Time: 03/20/25  2:29 PM   Result Value Ref Range    POST-VOID RESIDUAL VOLUME, ML  mL           There were no vitals filed for this visit.        Janette Serrano RN

## 2025-03-20 NOTE — PROGRESS NOTES
Patient came today for voiding trial.  The urine was bloody after he got home and was there for about an hour.  It was actually clear when he was discharged home.  But it has been slowly clearing and becoming more like urine.  I hand irrigated him today with about 50 cc and it came back very clear.  Maybe he was not drinking very much.  Catheter was removed after deflating the balloon.  Advised him to go home and come back later in the afternoon.  He might need a catheter back if he is retaining urine or peeing a lot of blood still.  I will be gone this afternoon but the physician assistant will see him and make a disposition.  If he is voiding acceptably I will see him back in about 4 weeks.  He can stop his alfuzosin after a couple of weeks as long as he is voiding okay.  He should hold his aspirin until his bleeding stops.    Late note: 3/22/25 4509.  His PVR was 160mL per nursing.  Can remain on alfuzosin through to his follow-up with me which should be in the next few weeks.

## 2025-03-21 PROCEDURE — 88305 TISSUE EXAM BY PATHOLOGIST: CPT | Performed by: PATHOLOGY

## 2025-03-21 NOTE — TELEPHONE ENCOUNTER
Patient called in wondering if he needs to be prescribed any antibiotics post op. He had a TURP on 3/19/2025 with Dr. Aguila. He completes doxycycline for a dermatology purpose on Sunday. Please advise.

## 2025-03-22 ENCOUNTER — RESULTS FOLLOW-UP (OUTPATIENT)
Dept: UROLOGY | Facility: CLINIC | Age: 80
End: 2025-03-22

## 2025-04-01 ENCOUNTER — NURSE TRIAGE (OUTPATIENT)
Age: 80
End: 2025-04-01

## 2025-04-01 NOTE — TELEPHONE ENCOUNTER
FOLLOW UP: TURP on 3/19/2025, was last seen in the office 3/20/2025, I encouraged water intake and reviewed ED precautions. Patient has a follow up  appointment 4/17/2025 with Jc.    REASON FOR CONVERSATION: Blood in Urine    SYMPTOMS: Starts urinating blood then turns into urine, bright red, intermittently    OTHER: Symptoms started last Thursday. Denies blood clots, fever, pain, or urinary symptoms. Has not started taking aspirin.    DISPOSITION: 72 Hour Provider Response    Reason for Disposition   The patient has gross hematuria without clots, new onset, no urine testing    Answer Assessment - Initial Assessment Questions  1. When did you start with blood in your urine, and can you describe things in detail? Do you have any blood clots, is the hematuria continuous or intermittent and can you describe the color of the blood in your urine in relation to a beverage?   Last Thursday, denies blood clots, bright red    2. Do you have lower back, flank, or lower abdominal/bladder pain?   Denies    3. Are you experiencing urinary frequency, urgency, pain or burning or any other changes in your urination like being unable to urinate?   Denies    4. Do you take any blood thinners?   Denies- has not started back on aspirin    6. Have you had any recent urine testing or imaging? (UA with micro, urine culture, kidney ultrasound, CT scan)? Or any recent urologic surgery or procedures?   TURP 3/19/2025    9. Do you have a history of bladder cancer?  Denies    Protocols used: Urology-Gross Hemaruria-ADULT-[place]

## 2025-04-15 NOTE — PROGRESS NOTES
Kevin Schmitz is a(n) 79 y.o. male. , :  1945    Subjective     Assessment:  The primary encounter diagnosis was Incomplete bladder emptying. A diagnosis of Benign prostatic hyperplasia with urinary hesitancy was also pertinent to this visit.  BPH  2025 TURP Dr. Aguila pathology: BPH and chronic prostatitis  Gynecomastia from finasteride  History of elevated PVR  PVR today 25 was zero   Currently using alfuzosin 10mg daily.   Hold on the daily tadalafil     Plan:  F/u 2-3 months for UA/PVR  IF PVR low and voiding OK will stop the alfuzosin 10mg  OK to restart the tadalafil 5mg.   Hold on ejaculation for 2 more weeks.     Radiology        Labs  04/19/10 PSA 2.0 Testosterone 337, Free T 6.3  09/03/10 PSA 2.0 Testosterone 485  10/20/10 Testosterone 355  12/14/10 Testosterone 385 s/p Testopel  12 PSA 1.8 Testosterone 339  13 PSA 1.8  14 PSA 2.9  05/04/15 PSA 2.0  16 PSA 2.5         PMH  DVT  Splenectomy  Babesiosis   Pancreatic cancer  with hx of whipple procedure and follows with [facility]  HTN  Chronic fatigue       History  1. BPH, slow stream and elevated PVR on alfuzosin 10mg since at least , thinks it helps. Stopped finasteride secondary to breast enlargement  2. ED, uses Levitra 5 & 10 mg. Rarely. Like never since wife no desire.  3. Hypogonadism, tried AndroGel and stopped, tried pellets. Tolerable. Stopped as not helping.  4. Peyronie’s disease, not an issue  5. 10/11/18 AUA 17/3. 10/11/19 AUA 25/5 07/10/20 19/3      Past  surgical history   2025 TURP Dr. Aguila pathology: BPH and chronic prostatitis         Prior Visits  22 JEFF Samuels [facility]   76 y.o. male with hx of BPH and stopped his finasteride but continued with alfuzosin. Presents for f/u repeat Uroflow/PVR and STEVEN. Notes a decent stream on the afluzosin. Some days better than other. No straining or hematuira with voiding.     Review of Systems   Constitutional: Negative for night  "sweats and unplanned weight loss.   Musculoskeletal: Negative for back pain (no nocturnal bone pain ).     Genitourinary:  Rectum: Normal.   Comments: Prostate grade II  Soft, NT  No nodularity       Assessment:    BPH on alfuzosin 10mg (Gyncomastia from finasteride)   Desire to avoid cystoscopy or procedure     Plan:  Patient Instructions   F/u 1 year STEVEN for Uroflow/PVR  Continue the alfuozosin 10mg daily      12/4/2024 OV Jc Samuels  79-year-old male with obstructive BPH developed gynecomastia on finasteride.  Subsequently was changed to tamsulosin.  Patient was evaluated at [facility] urology and was noted to have  cc for which cystoscopy with transrectal ultrasound was suggested.  Currently presents to establish care at [facility] urology.  Patient today was unable to void but was bladder scanned for 314 mL. Urinary frequency, urgency, nocturia, and weaker stream has been increasing for the past couple months.  No change in medication.     Assessment:    BPH  BPH on alfuzosin 10mg  in AM   Gynecomastia from finasteride.  No desire to retry   Incomplete bladder emptying   Bladder scan for 314ml.  No void prior.      Plan:  F/u uroflow/PVR and cystoscopy/TRUS to evaluate for blockage  Continue the alfuzosin 10mg in AM  OK to try tadalafil 5mg HS to help with BPH.  CP, SOB, GERD, back pain, HA, facial flushing   PSA prior to f/u.     4/17/2025 OV Jc Samuels  80 y/o s/p TURP 03/19/25.  Will get occ dysuria, no gross hematuria for the past 5 days. Urine stream variable and can be strong at times. When has strong stream may get a slit stream, no leakage of urine,     Review of Systems    Lab Results   Component Value Date    PSA 7.575 (H) 12/06/2024     No results found for: \"TESTOSTERONE\"  No components found for: \"CR\"  No results found for: \"HBA1C\"    Objective     /62 (BP Location: Left arm, Patient Position: Sitting, Cuff Size: Large)   Pulse 62   Ht 6' (1.829 m)   Wt 81.6 kg (180 lb)   SpO2 " 98%   BMI 24.41 kg/m²     Physical Exam  HENT:      Head: Normocephalic.   Pulmonary:      Effort: Pulmonary effort is normal.   Skin:     General: Skin is warm.   Psychiatric:         Mood and Affect: Mood normal.           [facility]

## 2025-04-17 ENCOUNTER — OFFICE VISIT (OUTPATIENT)
Dept: UROLOGY | Facility: CLINIC | Age: 80
End: 2025-04-17
Payer: COMMERCIAL

## 2025-04-17 VITALS
BODY MASS INDEX: 24.38 KG/M2 | HEIGHT: 72 IN | OXYGEN SATURATION: 98 % | HEART RATE: 62 BPM | SYSTOLIC BLOOD PRESSURE: 120 MMHG | DIASTOLIC BLOOD PRESSURE: 62 MMHG | WEIGHT: 180 LBS

## 2025-04-17 DIAGNOSIS — R39.11 BENIGN PROSTATIC HYPERPLASIA WITH URINARY HESITANCY: ICD-10-CM

## 2025-04-17 DIAGNOSIS — R33.9 INCOMPLETE BLADDER EMPTYING: Primary | ICD-10-CM

## 2025-04-17 DIAGNOSIS — N40.1 BENIGN PROSTATIC HYPERPLASIA WITH URINARY HESITANCY: ICD-10-CM

## 2025-04-17 LAB — POST-VOID RESIDUAL VOLUME, ML POC: 0 ML

## 2025-04-17 PROCEDURE — 51798 US URINE CAPACITY MEASURE: CPT | Performed by: PHYSICIAN ASSISTANT

## 2025-04-17 PROCEDURE — 99024 POSTOP FOLLOW-UP VISIT: CPT | Performed by: PHYSICIAN ASSISTANT

## 2025-04-22 ENCOUNTER — TELEPHONE (OUTPATIENT)
Dept: OTOLARYNGOLOGY | Facility: CLINIC | Age: 80
End: 2025-04-22

## 2025-04-29 DIAGNOSIS — E78.00 HYPERCHOLESTEROLEMIA: ICD-10-CM

## 2025-04-30 RX ORDER — ROSUVASTATIN CALCIUM 5 MG/1
5 TABLET, COATED ORAL DAILY
Qty: 90 TABLET | Refills: 1 | Status: SHIPPED | OUTPATIENT
Start: 2025-04-30

## 2025-05-13 DIAGNOSIS — I10 ESSENTIAL HYPERTENSION: ICD-10-CM

## 2025-05-14 RX ORDER — AMLODIPINE BESYLATE 10 MG/1
10 TABLET ORAL DAILY
Qty: 90 TABLET | Refills: 1 | Status: SHIPPED | OUTPATIENT
Start: 2025-05-14

## 2025-05-15 ENCOUNTER — TELEPHONE (OUTPATIENT)
Dept: GASTROENTEROLOGY | Facility: CLINIC | Age: 80
End: 2025-05-15

## 2025-05-19 ENCOUNTER — OFFICE VISIT (OUTPATIENT)
Dept: OTOLARYNGOLOGY | Facility: CLINIC | Age: 80
End: 2025-05-19
Payer: COMMERCIAL

## 2025-05-19 ENCOUNTER — OFFICE VISIT (OUTPATIENT)
Dept: AUDIOLOGY | Facility: CLINIC | Age: 80
End: 2025-05-19
Payer: COMMERCIAL

## 2025-05-19 VITALS
WEIGHT: 180 LBS | HEART RATE: 65 BPM | HEIGHT: 72 IN | TEMPERATURE: 97.9 F | BODY MASS INDEX: 24.38 KG/M2 | OXYGEN SATURATION: 96 %

## 2025-05-19 DIAGNOSIS — H90.3 SENSORINEURAL HEARING LOSS (SNHL), BILATERAL: Primary | ICD-10-CM

## 2025-05-19 DIAGNOSIS — H93.13 BILATERAL TINNITUS: ICD-10-CM

## 2025-05-19 DIAGNOSIS — H61.23 BILATERAL IMPACTED CERUMEN: ICD-10-CM

## 2025-05-19 PROCEDURE — 69210 REMOVE IMPACTED EAR WAX UNI: CPT | Performed by: NURSE PRACTITIONER

## 2025-05-19 PROCEDURE — 92557 COMPREHENSIVE HEARING TEST: CPT | Performed by: AUDIOLOGIST

## 2025-05-19 PROCEDURE — 92567 TYMPANOMETRY: CPT | Performed by: AUDIOLOGIST

## 2025-05-19 PROCEDURE — 99204 OFFICE O/P NEW MOD 45 MIN: CPT | Performed by: NURSE PRACTITIONER

## 2025-05-19 NOTE — PROGRESS NOTES
:  Assessment & Plan  Sensorineural hearing loss (SNHL), bilateral    Orders:    Ambulatory referral to Audiology    Bilateral tinnitus    Orders:    Ambulatory referral to Audiology    Bilateral impacted cerumen             On exam noted bilateral cerumen impaction and unable to fully view tympanic membrane.  Cerumen impaction removed bilateral eac with alligator forceps and suction, pt tolerated procedure well.  Upon removal, improved hearing and decreased clogged sensation of bilateral ears.  Discussed routine cerumen care including avoidance of q-tips, may use cerumen softeners every one to two months.  Hydrocortisone cream pea sized amount on finger as needed for itching in ears.     Audiogram completed today reviewed and interpreted and indicated bilateral mild sloping to severe SNHL, slight asymmetry on left at 1500 K to 2 K and then on right 6K to 8 K. Word discrimination 100% bilaterally. Tymps Type A bilaterally    Typical causes of SNHL include maturity changes, noise exposure, and hereditary risk factors.   Offered options for bilateral SNHL including acceptance, lifestyle changes such as moving closer to those who are speaking, or hearing amplification.  He would qualify for hearing amplification based on audiogram.  Discussed hearing aid options including facilities to obtain a hearing aid from as well as costs and benefits.  Discussed that quality of life may improve with hearing amplification. Encouraged to review his insurance coverage and [facility].      Tinnitus and possible causes including medications, viral illness, inner ear disorder, TMJ syndrome, or hearing changes.   Options for bilateral tinnitus including adding background noise, tinnitus retraining therapy, masking device, Resound tinnitus emir, Acupuncture, or acceptance.  Limit caffeine and ibuprofen intake. Discouraged q-tip use due to damage of ear canal.  Consider Flonase daily for eustachian tube dysfunction.  No  specific medications or surgery indicated for treatment of tinnitus.        History of Present Illness     Kevin Schmitz is a 79 y.o. male   Presents today as a new patient due to ear concerns.  Hearing gradually worsening.  Buzzing tinnitus.  Blowing nose pressure in ears improves but does return.  No otalgia or otorrhea.  No history of ear surgery.  No current hearing aids. 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   [employer] to [employer]        Review of Systems   Constitutional: Negative.    HENT:  Positive for hearing loss. Negative for congestion, ear discharge, ear pain, nosebleeds, postnasal drip, rhinorrhea, sinus pressure, sinus pain, sore throat, tinnitus and voice change.    Respiratory:  Negative for chest tightness and shortness of breath.    Skin:  Negative for color change.   Neurological:  Negative for dizziness, numbness and headaches.   Psychiatric/Behavioral: Negative.       Objective   Pulse 65   Temp 97.9 °F (36.6 °C) (Temporal)   Ht 6' (1.829 m)   Wt 81.6 kg (180 lb)   SpO2 96%   BMI 24.41 kg/m²      Physical Exam  Vitals and nursing note reviewed.   Constitutional:       General: He is not in acute distress.     Appearance: He is well-developed.   HENT:      Head: Normocephalic and atraumatic.      Right Ear: Tympanic membrane, ear canal and external ear normal. There is impacted cerumen.      Left Ear: Tympanic membrane, ear canal and external ear normal. There is impacted cerumen.      Nose: Nose normal.      Mouth/Throat:      Mouth: Mucous membranes are moist.   Pulmonary:      Effort: Pulmonary effort is normal.     Musculoskeletal:      Cervical back: Neck supple.     Skin:     General: Skin is warm and dry.     Neurological:      Mental Status: He is alert.     Psychiatric:         Mood and Affect: Mood normal.       Ear cerumen removal    Date/Time: 5/19/2025 4:00 PM    Performed by: DOUGIE Reed  Authorized by: DOUGIE Reed    Universal Protocol:  procedure performed  by consultantConsent: Verbal consent obtained  Risks and benefits: risks, benefits and alternatives were discussed  Consent given by: patient  Patient understanding: patient states understanding of the procedure being performed    Patient location:  Clinic  Procedure details:     Local anesthetic:  None    Location:  L ear and R ear    Approach:  External  Post-procedure details:     Complication:  None    Hearing quality:  Normal    Patient tolerance of procedure:  Tolerated well, no immediate complications

## 2025-05-19 NOTE — PATIENT INSTRUCTIONS
Tinnitus and possible causes including medications, viral illness, inner ear disorder, TMJ syndrome, or hearing changes.   Options for bilateral tinnitus including adding background noise, tinnitus retraining therapy, masking device, Resound tinnitus emir, Acupuncture, or acceptance.  Limit caffeine and ibuprofen intake. Discouraged q-tip use due to damage of ear canal.  Consider Flonase daily for eustachian tube dysfunction.  No specific medications or surgery indicated for treatment of tinnitus.     Wax care at home - avoidance of q-tips, may use cerumen softeners every one to two months.  Hydrocortisone cream pea sized amount on finger as needed for itching in ears.

## 2025-05-19 NOTE — PROGRESS NOTES
ENT HEARING EVALUATION    Name:  Kevin Schmitz  :  1945  Age:  79 y.o.   MRN:  [id number]  Date of Evaluation: 25     HISTORY:    Reason for visit:  Difficulty hearing / Tinnitus     Kevin Schmitz is being seen today for an evaluation of hearing as part of their ENT visit.   Mr. Schmitz was previously seen on 12/3/19 for audiological evaluation     EVALUATION:    Otoscopy revealed cleared canals bilaterally (T James)    Tympanometry:   Right Ear: Type Ad; normal middle ear pressure with increased static compliance, consistent with a hypermobile tympanic membrane    Left Ear: Type Ad; normal middle ear pressure with increased static compliance, consistent with a hypermobile tympanic membrane     Speech Audiometry:  Speech Reception (SRT)   Right Ear: 25 dB HL   Left Ear: 25 dB HL    Word Recognition Scores (WRS):  Right Ear: excellent (100 % correct)     Left Ear: excellent (100 % correct)   Stimuli: NU-6    Pure Tone Audiometry:  Conventional pure tone audiometry from 250 - 8000 Hz  was obtained with good reliability and revealed the following:     Right Ear: Mild sloping to severe sensorineural hearing loss (SNHL)    Left Ear: Mild sloping to severe sensorineural hearing loss (SNHL)       *see attached audiogram    IMPRESSIONS:   Thresholds and word recognition scores are stable to those obtained on 12/3/25    RECOMMENDATIONS:  Follow up with ENT   Consider HA evaluation should the hearing difficulties be an issue on a daily basis.  Patient stated his wife had concerns   Annual to 2 year hearing evaluations for monitoring purposes       Chandni Diane, CCC-A  Clinical Audiologist  [id number]  [phone]

## 2025-06-03 ENCOUNTER — HOSPITAL ENCOUNTER (OUTPATIENT)
Facility: HOSPITAL | Age: 80
Setting detail: OBSERVATION
Discharge: HOME | End: 2025-06-04
Attending: STUDENT IN AN ORGANIZED HEALTH CARE EDUCATION/TRAINING PROGRAM | Admitting: FAMILY MEDICINE
Payer: COMMERCIAL

## 2025-06-03 ENCOUNTER — APPOINTMENT (EMERGENCY)
Dept: RADIOLOGY | Facility: HOSPITAL | Age: 80
End: 2025-06-03
Payer: COMMERCIAL

## 2025-06-03 DIAGNOSIS — R20.2 FACIAL PARESTHESIA: Primary | ICD-10-CM

## 2025-06-03 DIAGNOSIS — I80.00: ICD-10-CM

## 2025-06-03 DIAGNOSIS — Z86.718 HISTORY OF DVT OF LOWER EXTREMITY: ICD-10-CM

## 2025-06-03 LAB
2HR DELTA HS TROPONIN: 0 NG/L
ANION GAP SERPL CALCULATED.3IONS-SCNC: 10 MMOL/L (ref 4–13)
BASOPHILS # BLD AUTO: 0.06 THOUSANDS/ÂΜL (ref 0–0.1)
BASOPHILS NFR BLD AUTO: 1 % (ref 0–1)
BUN SERPL-MCNC: 16 MG/DL (ref 5–25)
CALCIUM SERPL-MCNC: 8.8 MG/DL (ref 8.4–10.2)
CARDIAC TROPONIN I PNL SERPL HS: 3 NG/L (ref ?–50)
CARDIAC TROPONIN I PNL SERPL HS: 3 NG/L (ref ?–50)
CHLORIDE SERPL-SCNC: 105 MMOL/L (ref 96–108)
CO2 SERPL-SCNC: 23 MMOL/L (ref 21–32)
CREAT SERPL-MCNC: 0.77 MG/DL (ref 0.6–1.3)
EOSINOPHIL # BLD AUTO: 0.24 THOUSAND/ÂΜL (ref 0–0.61)
EOSINOPHIL NFR BLD AUTO: 5 % (ref 0–6)
ERYTHROCYTE [DISTWIDTH] IN BLOOD BY AUTOMATED COUNT: 13.4 % (ref 11.6–15.1)
GFR SERPL CREATININE-BSD FRML MDRD: 87 ML/MIN/1.73SQ M
GLUCOSE SERPL-MCNC: 123 MG/DL (ref 65–140)
HCT VFR BLD AUTO: 42 % (ref 36.5–49.3)
HGB BLD-MCNC: 14.2 G/DL (ref 12–17)
IMM GRANULOCYTES # BLD AUTO: 0.01 THOUSAND/UL (ref 0–0.2)
IMM GRANULOCYTES NFR BLD AUTO: 0 % (ref 0–2)
LYMPHOCYTES # BLD AUTO: 1.25 THOUSANDS/ÂΜL (ref 0.6–4.47)
LYMPHOCYTES NFR BLD AUTO: 26 % (ref 14–44)
MCH RBC QN AUTO: 30.9 PG (ref 26.8–34.3)
MCHC RBC AUTO-ENTMCNC: 33.8 G/DL (ref 31.4–37.4)
MCV RBC AUTO: 91 FL (ref 82–98)
MONOCYTES # BLD AUTO: 0.81 THOUSAND/ÂΜL (ref 0.17–1.22)
MONOCYTES NFR BLD AUTO: 17 % (ref 4–12)
NEUTROPHILS # BLD AUTO: 2.51 THOUSANDS/ÂΜL (ref 1.85–7.62)
NEUTS SEG NFR BLD AUTO: 51 % (ref 43–75)
NRBC BLD AUTO-RTO: 0 /100 WBCS
PLATELET # BLD AUTO: 256 THOUSANDS/UL (ref 149–390)
PMV BLD AUTO: 9.7 FL (ref 8.9–12.7)
POTASSIUM SERPL-SCNC: 4 MMOL/L (ref 3.5–5.3)
RBC # BLD AUTO: 4.6 MILLION/UL (ref 3.88–5.62)
SODIUM SERPL-SCNC: 138 MMOL/L (ref 135–147)
VIT B12 SERPL-MCNC: 239 PG/ML (ref 180–914)
WBC # BLD AUTO: 4.88 THOUSAND/UL (ref 4.31–10.16)

## 2025-06-03 PROCEDURE — 70498 CT ANGIOGRAPHY NECK: CPT

## 2025-06-03 PROCEDURE — 99223 1ST HOSP IP/OBS HIGH 75: CPT | Performed by: FAMILY MEDICINE

## 2025-06-03 PROCEDURE — 80048 BASIC METABOLIC PNL TOTAL CA: CPT | Performed by: STUDENT IN AN ORGANIZED HEALTH CARE EDUCATION/TRAINING PROGRAM

## 2025-06-03 PROCEDURE — 85025 COMPLETE CBC W/AUTO DIFF WBC: CPT | Performed by: STUDENT IN AN ORGANIZED HEALTH CARE EDUCATION/TRAINING PROGRAM

## 2025-06-03 PROCEDURE — 93971 EXTREMITY STUDY: CPT

## 2025-06-03 PROCEDURE — 84484 ASSAY OF TROPONIN QUANT: CPT | Performed by: STUDENT IN AN ORGANIZED HEALTH CARE EDUCATION/TRAINING PROGRAM

## 2025-06-03 PROCEDURE — 93971 EXTREMITY STUDY: CPT | Performed by: SURGERY

## 2025-06-03 PROCEDURE — 99285 EMERGENCY DEPT VISIT HI MDM: CPT | Performed by: STUDENT IN AN ORGANIZED HEALTH CARE EDUCATION/TRAINING PROGRAM

## 2025-06-03 PROCEDURE — 93005 ELECTROCARDIOGRAM TRACING: CPT

## 2025-06-03 PROCEDURE — 99284 EMERGENCY DEPT VISIT MOD MDM: CPT

## 2025-06-03 PROCEDURE — 82607 VITAMIN B-12: CPT

## 2025-06-03 PROCEDURE — 36415 COLL VENOUS BLD VENIPUNCTURE: CPT | Performed by: STUDENT IN AN ORGANIZED HEALTH CARE EDUCATION/TRAINING PROGRAM

## 2025-06-03 PROCEDURE — 70496 CT ANGIOGRAPHY HEAD: CPT

## 2025-06-03 RX ORDER — ASPIRIN 81 MG/1
81 TABLET, CHEWABLE ORAL DAILY
Status: DISCONTINUED | OUTPATIENT
Start: 2025-06-04 | End: 2025-06-04 | Stop reason: HOSPADM

## 2025-06-03 RX ORDER — AMLODIPINE BESYLATE 10 MG/1
10 TABLET ORAL DAILY
Status: DISCONTINUED | OUTPATIENT
Start: 2025-06-03 | End: 2025-06-04 | Stop reason: HOSPADM

## 2025-06-03 RX ORDER — LORAZEPAM 2 MG/ML
0.5 INJECTION INTRAMUSCULAR ONCE AS NEEDED
Status: COMPLETED | OUTPATIENT
Start: 2025-06-03 | End: 2025-06-04

## 2025-06-03 RX ORDER — CARVEDILOL 12.5 MG/1
12.5 TABLET ORAL 2 TIMES DAILY
Status: DISCONTINUED | OUTPATIENT
Start: 2025-06-03 | End: 2025-06-04 | Stop reason: HOSPADM

## 2025-06-03 RX ORDER — SPIRONOLACTONE AND HYDROCHLOROTHIAZIDE 25; 25 MG/1; MG/1
1.5 TABLET ORAL DAILY
Status: DISCONTINUED | OUTPATIENT
Start: 2025-06-03 | End: 2025-06-04 | Stop reason: HOSPADM

## 2025-06-03 RX ORDER — ATORVASTATIN CALCIUM 40 MG/1
40 TABLET, FILM COATED ORAL EVERY EVENING
Status: DISCONTINUED | OUTPATIENT
Start: 2025-06-03 | End: 2025-06-04 | Stop reason: HOSPADM

## 2025-06-03 RX ORDER — ENOXAPARIN SODIUM 100 MG/ML
40 INJECTION SUBCUTANEOUS DAILY
Status: DISCONTINUED | OUTPATIENT
Start: 2025-06-03 | End: 2025-06-04 | Stop reason: HOSPADM

## 2025-06-03 RX ADMIN — ENOXAPARIN SODIUM 40 MG: 40 INJECTION SUBCUTANEOUS at 17:30

## 2025-06-03 RX ADMIN — IOHEXOL 85 ML: 350 INJECTION, SOLUTION INTRAVENOUS at 13:18

## 2025-06-03 RX ADMIN — ATORVASTATIN CALCIUM 40 MG: 40 TABLET, FILM COATED ORAL at 17:30

## 2025-06-03 NOTE — ED PROVIDER NOTES
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               Assessment & Plan       Medical Decision Making  Patient is a 79 y.o. male who presents to the ED for left sided facial tingling/paresthesias. Patient is nontoxic, well-appearing.    No stroke alert given patient is out of window.    Symptoms concerning for acute CVA versus TIA. EKG without evidence of STEMI or ischemia, fingerstick BS not hypoglycemic, and clinical picture does not suggest other stroke mimic. I considered, but think less likely that symptoms are secondary to a dissection, AMI, hypoglycemia, other metabolic derangement including hepatic/uremic encephalopathy, medication side effect, or post-ictal Ruiz's paralysis.     Plan: CT CTA Head and Neck w/ and w/out contrast, stroke labs, EKG, likely admit                 Amount and/or Complexity of Data Reviewed  Labs: ordered.  Radiology: ordered.    Risk  Prescription drug management.  Decision regarding hospitalization.        ED Course as of 06/03/25 1613   Tue Jun 03, 2025   1437 Patient reevaluated.  Resting comfortably.  Discussed results and findings.  Explained negative CTA.  Recommended MRI.  Patient in agreement.  Discussed with slim.  Accepts admission.       Medications   amLODIPine (NORVASC) tablet 10 mg ( Oral Held Dose 6/6/25 0900)   carvedilol (COREG) tablet 12.5 mg ( Oral Held Dose  "6/6/25 1800)   spironolactone-hydrochlorothiazide (ALDACTAZIDE) 25-25 mg per tablet 1.5 tablet ( Oral Held Dose 6/6/25 0900)   atorvastatin (LIPITOR) tablet 40 mg (has no administration in time range)   aspirin chewable tablet 81 mg (has no administration in time range)   enoxaparin (LOVENOX) subcutaneous injection 40 mg (has no administration in time range)   iohexol (OMNIPAQUE) 350 MG/ML injection (MULTI-DOSE) 85 mL (85 mL Intravenous Given 6/3/25 1318)       ED Risk Strat Scores                    No data recorded                            History of Present Illness       Chief Complaint   Patient presents with    Facial Numbness     Patient c/o left sided facial \"tingling\" that started at 0600 - states the sensation is almost back to normal.       Past Medical History[1]   Past Surgical History[2]   Family History[3]   Social History[4]   E-Cigarette/Vaping    E-Cigarette Use Never User       E-Cigarette/Vaping Substances    Nicotine No     THC No     CBD No     Flavoring No     Other No     Unknown No       I have reviewed and agree with the history as documented.     79-year-old male who presents emergency room for left-sided facial tingling.  Started this morning after waking up around 6 to 7 AM.  Has improved throughout the day.  However, still present prompting visit to the ED.  No modifying factors.  Associated with left lower extremity pain (concerned may be DVT).  No other numbness, tingling, weakness.  No history of symptoms like this in the past.  No chest pain, shortness of breath.  No other complaints or concerns.          Review of Systems   Neurological:         Paresthesias   All other systems reviewed and are negative.          Objective       ED Triage Vitals [06/03/25 1145]   Temperature Pulse Blood Pressure Respirations SpO2 Patient Position - Orthostatic VS   97.8 °F (36.6 °C) 72 149/73 19 96 % Sitting      Temp Source Heart Rate Source BP Location FiO2 (%) Pain Score    Tympanic Monitor " Right arm -- No Pain      Vitals      Date and Time Temp Pulse SpO2 Resp BP Pain Score FACES Pain Rating User   06/03/25 1609 -- 61 98 % 18 121/59 -- -- CS   06/03/25 1338 97.9 °F (36.6 °C) 63 95 % 18 133/64 -- -- JK   06/03/25 1329 -- -- -- -- -- No Pain -- SF   06/03/25 1145 97.8 °F (36.6 °C) 72 96 % 19 149/73 No Pain -- AC            Physical Exam  Vitals and nursing note reviewed.   Constitutional:       General: He is not in acute distress.     Appearance: He is well-developed. He is not ill-appearing, toxic-appearing or diaphoretic.   HENT:      Head: Normocephalic and atraumatic.      Right Ear: External ear normal.      Left Ear: External ear normal.      Nose: Nose normal.     Eyes:      General: Lids are normal. No scleral icterus.      Cardiovascular:      Rate and Rhythm: Normal rate and regular rhythm.      Heart sounds: Normal heart sounds. No murmur heard.     No friction rub. No gallop.   Pulmonary:      Effort: Pulmonary effort is normal. No respiratory distress.      Breath sounds: Normal breath sounds. No wheezing or rales.     Musculoskeletal:         General: No deformity. Normal range of motion.      Cervical back: Normal range of motion and neck supple.     Skin:     General: Skin is warm and dry.     Neurological:      General: No focal deficit present.      Mental Status: He is alert and oriented to person, place, and time.      GCS: GCS eye subscore is 4. GCS verbal subscore is 5. GCS motor subscore is 6.      Motor: Motor function is intact.      Comments: Very mild paresthesias to the left lower face.  No facial droop.  Rest of neurologic exam is unremarkable   Psychiatric:         Mood and Affect: Mood normal.         Behavior: Behavior normal.         Results Reviewed       Procedure Component Value Units Date/Time    Vitamin B12 [[id number]] Collected: 06/03/25 1204    Lab Status: In process Specimen: Blood from Arm, Left Updated: 06/03/25 1605    HS Troponin I 2hr [[id number]]  (Normal)  Collected: 06/03/25 1513    Lab Status: Final result Specimen: Blood from Hand, Right Updated: 06/03/25 1549     hs TnI 2hr 3 ng/L      Delta 2hr hsTnI 0 ng/L     HS Troponin 0hr (reflex protocol) [[id number]]  (Normal) Collected: 06/03/25 1204    Lab Status: Final result Specimen: Blood from Arm, Left Updated: 06/03/25 1234     hs TnI 0hr 3 ng/L     HS Troponin I 4hr [[id number]]     Lab Status: No result Specimen: Blood     Basic metabolic panel [[id number]] Collected: 06/03/25 1204    Lab Status: Final result Specimen: Blood from Arm, Left Updated: 06/03/25 1227     Sodium 138 mmol/L      Potassium 4.0 mmol/L      Chloride 105 mmol/L      CO2 23 mmol/L      ANION GAP 10 mmol/L      BUN 16 mg/dL      Creatinine 0.77 mg/dL      Glucose 123 mg/dL      Calcium 8.8 mg/dL      eGFR 87 ml/min/1.73sq m     Narrative:      National Kid[employer] Foundation guidelines for Chronic Kidney Disease (CKD):     Stage 1 with normal or high GFR (GFR > 90 mL/min/1.73 square meters)    Stage 2 Mild CKD (GFR = 60-89 mL/min/1.73 square meters)    Stage 3A Moderate CKD (GFR = 45-59 mL/min/1.73 square meters)    Stage 3B Moderate CKD (GFR = 30-44 mL/min/1.73 square meters)    Stage 4 Severe CKD (GFR = 15-29 mL/min/1.73 square meters)    Stage 5 End Stage CKD (GFR <15 mL/min/1.73 square meters)  Note: GFR calculation is accurate only with a steady state creatinine    CBC and differential [[id number]]  (Abnormal) Collected: 06/03/25 1204    Lab Status: Final result Specimen: Blood from Arm, Left Updated: 06/03/25 1209     WBC 4.88 Thousand/uL      RBC 4.60 Million/uL      Hemoglobin 14.2 g/dL      Hematocrit 42.0 %      MCV 91 fL      MCH 30.9 pg      MCHC 33.8 g/dL      RDW 13.4 %      MPV 9.7 fL      Platelets 256 Thousands/uL      nRBC 0 /100 WBCs      Segmented % 51 %      Immature Grans % 0 %      Lymphocytes % 26 %      Monocytes % 17 %      Eosinophils Relative 5 %      Basophils Relative 1 %      Absolute Neutrophils 2.51  Thousands/µL      Absolute Immature Grans 0.01 Thousand/uL      Absolute Lymphocytes 1.25 Thousands/µL      Absolute Monocytes 0.81 Thousand/µL      Eosinophils Absolute 0.24 Thousand/µL      Basophils Absolute 0.06 Thousands/µL             CTA head and neck with and without contrast   Final Interpretation by Panchito Rowland MD (06/03 8173)   CT Brain: No mass effect, acute intracranial hemorrhage or evidence of recent infarction.      CT Angiography: No evidence for high-grade stenosis, focal occlusion or vascular aneurysm of the cervical or intracranial vessels.            Workstation performed: [id number]         VAS lower limb venous duplex study, unilateral/limited   Final Interpretation by Salbador Talamantes MD (06/03 1789)      MRI Inpatient Order    (Results Pending)       ECG 12 Lead Documentation Only    Date/Time: 6/3/2025 4:09 PM    Performed by: Nikko Amaro DO  Authorized by: Nikko Amaro DO    ECG reviewed by me, the ED Provider: yes    Patient location:  ED  Interpretation:     Interpretation: abnormal    Rate:     ECG rate:  63    ECG rate assessment: normal    Rhythm:     Rhythm: sinus rhythm    Ectopy:     Ectopy: none    QRS:     QRS axis:  Normal  Conduction:     Conduction: abnormal      Abnormal conduction: incomplete RBBB    ST segments:     ST segments:  Normal  T waves:     T waves: normal        ED Medication and Procedure Management   Prior to Admission Medications   Prescriptions Last Dose Informant Patient Reported? Taking?   Ascorbic Acid (vitamin C) 1000 MG tablet  Self, Spouse/Significant Other Yes No   Sig: Take 1,000 mg by mouth in the morning.   DOXYCYCLINE PO   Yes No   Sig: Take by mouth   Patient not taking: Reported on 5/19/2025   alfuzosin (UROXATRAL) 10 mg 24 hr tablet   No No   Sig: Take 1 tablet (10 mg total) by mouth daily   amLODIPine (NORVASC) 10 mg tablet   No No   Sig: TAKE 1 TABLET BY MOUTH DAILY   aspirin 81 mg chewable tablet  Self, Spouse/Significant  Other Yes No   Sig: Chew 81 mg in the morning.   carvedilol (COREG) 6.25 mg tablet   No No   Sig: TAKE 2 TABLETS BY MOUTH TWICE  DAILY   cholecalciferol (VITAMIN D3) 1,000 units tablet  Self, Spouse/Significant Other Yes No   Sig: Take 5,000 Units by mouth in the morning.   rosuvastatin (CRESTOR) 5 mg tablet   No No   Sig: TAKE 1 TABLET BY MOUTH DAILY   spironolactone-hydrochlorothiazide (ALDACTAZIDE) 25-25 mg per tablet  Self, Spouse/Significant Other No No   Sig: TAKE ONE-HALF TABLET BY MOUTH  DAILY   tadalafil (CIALIS) 5 MG tablet   No No   Sig: Take 1 tablet (5 mg total) by mouth daily as needed for erectile dysfunction   triamcinolone (KENALOG) 0.1 % cream  Self, Spouse/Significant Other No No   Sig: Apply topically 2 (two) times a day      Facility-Administered Medications: None     Patient's Medications   Discharge Prescriptions    No medications on file     No discharge procedures on file.  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                    [1]   Past Medical History:  Diagnosis Date    BEP (benign enlargement of prostate)     Cancer (HCC)     pancreatic   whipple procedure  basal cell    Cardiac murmur     Colon polyp     DVT (deep venous thrombosis) (HCC)     left leg    Erectile dysfunction     HL (hearing loss) 2020    Hyperlipidemia     Hypertension     Positive colorectal cancer screening using Cologuard test 01/2021    PVC (premature ventricular contraction) 1990's    hx of, most recent stress test 2018 - normal results per pt    Right bundle branch block     Tinnitus 2015   [2]   Past Surgical History:  Procedure Laterality  Date    APPENDECTOMY      COLONOSCOPY      HERNIA REPAIR      right inguinal x2 and left inguinal x1    PANCREAS SURGERY      [facility] TRURL ELECTROSURG RESCJ PROSTATE BLEED COMPLETE N/A 3/19/2025    Procedure: TRANSURETHRAL RESECTION OF PROSTATE (TURP);  Surgeon: Arturo Aguila MD;  Location: [facility];  Service: Urology    SPLENECTOMY, TOTAL      WHIPPLE PROCEDURE/PANCREATICO-DUODENECTOMY     [3]   Family History  Problem Relation Name Age of Onset    Hypertension Mother Stephanie schmitz     Hypertension Father Nilesh Schmitz     Cancer Father Nilesh Schmitz     Cancer Daughter Angelia    [4]   Social History  Tobacco Use    Smoking status: Never    Smokeless tobacco: Never   Vaping Use    Vaping status: Never Used   Substance Use Topics    Alcohol use: Yes     Alcohol/week: 10.0 standard drinks of alcohol     Types: 10 Cans of beer per week     Comment: occasional    Drug use: No        Nikko Amaro DO  06/03/25 [id number]

## 2025-06-03 NOTE — PLAN OF CARE
Problem: PAIN - ADULT  Goal: Verbalizes/displays adequate comfort level or baseline comfort level  Description: Interventions:  - Encourage patient to monitor pain and request assistance  - Assess pain using appropriate pain scale  - Administer analgesics as ordered based on type and severity of pain and evaluate response  - Implement non-pharmacological measures as appropriate and evaluate response  - Consider cultural and social influences on pain and pain management  - Notify physician/advanced practitioner if interventions unsuccessful or patient reports new pain  - Educate patient/family on pain management process including their role and importance of  reporting pain   - Provide non-pharmacologic/complimentary pain relief interventions  Outcome: Progressing     Problem: INFECTION - ADULT  Goal: Absence or prevention of progression during hospitalization  Description: INTERVENTIONS:  - Assess and monitor for signs and symptoms of infection  - Monitor lab/diagnostic results  - Monitor all insertion sites, i.e. indwelling lines, tubes, and drains  - Monitor endotracheal if appropriate and nasal secretions for changes in amount and color  - [place] appropriate cooling/warming therapies per order  - Administer medications as ordered  - Instruct and encourage patient and family to use good hand hygiene technique  - Identify and instruct in appropriate isolation precautions for identified infection/condition  Outcome: Progressing  Goal: Absence of fever/infection during neutropenic period  Description: INTERVENTIONS:  - Monitor WBC  - Perform strict hand hygiene  - Limit to healthy visitors only  - No plants, dried, fresh or silk flowers with ferro in patient room  Outcome: Progressing     Problem: SAFETY ADULT  Goal: Patient will remain free of falls  Description: INTERVENTIONS:  - Educate patient/family on patient safety including physical limitations  - Instruct patient to call for assistance with activity   -  Consider consulting OT/PT to assist with strengthening/mobility based on AM PAC & JH-HLM score  - Consult OT/PT to assist with strengthening/mobility   - Keep Call bell within reach  - Keep bed low and locked with side rails adjusted as appropriate  - Keep care items and personal belongings within reach  - Initiate and maintain comfort rounds  - Make Fall Risk Sign visible to staff  - Offer Toileting every 2 Hours, in advance of need  - Initiate/Maintain bed alarm  - Obtain necessary fall risk management equipment: fall risk sign on door  - Apply yellow socks and bracelet for high fall risk patients  - Consider moving patient to room near nurses station  Outcome: Progressing  Goal: Maintain or return to baseline ADL function  Description: INTERVENTIONS:  -  Assess patient's ability to carry out ADLs; assess patient's baseline for ADL function and identify physical deficits which impact ability to perform ADLs (bathing, care of mouth/teeth, toileting, grooming, dressing, etc.)  - Assess/evaluate cause of self-care deficits   - Assess range of motion  - Assess patient's mobility; develop plan if impaired  - Assess patient's need for assistive devices and provide as appropriate  - Encourage maximum independence but intervene and supervise when necessary  - Involve family in performance of ADLs  - Assess for home care needs following discharge   - Consider OT consult to assist with ADL evaluation and planning for discharge  - Provide patient education as appropriate  - Monitor functional capacity and physical performance, use of AM PAC & JH-HLM   - Monitor gait, balance and fatigue with ambulation    Outcome: Progressing  Goal: Maintains/Returns to pre admission functional level  Description: INTERVENTIONS:  - Perform AM-PAC 6 Click Basic Mobility/ Daily Activity assessment daily.  - Set and communicate daily mobility goal to care team and patient/family/caregiver.   - Collaborate with rehabilitation services on  mobility goals if consulted  - Perform Range of Motion 2 times a day.  - Reposition patient every 2 hours.  - Dangle patient 2 times a day  - Stand patient 2 times a day  - Ambulate patient 2 times a day  - Out of bed to chair 3 times a day   - Out of bed for meals 3 times a day  - Out of bed for toileting  - Record patient progress and toleration of activity level   Outcome: Progressing     Problem: DISCHARGE PLANNING  Goal: Discharge to home or other facility with appropriate resources  Description: INTERVENTIONS:  - Identify barriers to discharge w/patient and caregiver  - Arrange for needed discharge resources and transportation as appropriate  - Identify discharge learning needs (meds, wound care, etc.)  - Arrange for interpretive services to assist at discharge as needed  - Refer to Case Management Department for coordinating discharge planning if the patient needs post-hospital services based on physician/advanced practitioner order or complex needs related to functional status, cognitive ability, or social support system  Outcome: Progressing     Problem: Knowledge Deficit  Goal: Patient/family/caregiver demonstrates understanding of disease process, treatment plan, medications, and discharge instructions  Description: Complete learning assessment and assess knowledge base.  Interventions:  - Provide teaching at level of understanding  - Provide teaching via preferred learning methods  Outcome: Progressing     Problem: Nutrition/Hydration-ADULT  Goal: Nutrient/Hydration intake appropriate for improving, restoring or maintaining nutritional needs  Description: Monitor and assess patient's nutrition/hydration status for malnutrition. Collaborate with interdisciplinary team and initiate plan and interventions as ordered.  Monitor patient's weight and dietary intake as ordered or per policy. Utilize nutrition screening tool and intervene as necessary. Determine patient's food preferences and provide  high-protein, high-caloric foods as appropriate.     INTERVENTIONS:  - Monitor oral intake, urinary output, labs, and treatment plans  - Assess nutrition and hydration status and recommend course of action  - Evaluate amount of meals eaten  - Assist patient with eating if necessary   - Allow adequate time for meals  - Recommend/ encourage appropriate diets, oral nutritional supplements, and vitamin/mineral supplements  - Order, calculate, and assess calorie counts as needed  - Recommend, monitor, and adjust tube feedings and TPN/PPN based on assessed needs  - Assess need for intravenous fluids  - Provide specific nutrition/hydration education as appropriate  - Include patient/family/caregiver in decisions related to nutrition  Outcome: Progressing     Problem: NEUROSENSORY - ADULT  Goal: Achieves stable or improved neurological status  Description: INTERVENTIONS  - Monitor and report changes in neurological status  - Monitor vital signs such as temperature, blood pressure, glucose, and any other labs ordered   - Initiate measures to prevent increased intracranial pressure  - Monitor for seizure activity and implement precautions if appropriate      Outcome: Progressing  Goal: Achieves maximal functionality and self care  Description: INTERVENTIONS  - Monitor swallowing and airway patency with patient fatigue and changes in neurological status  - Encourage and assist patient to increase activity and self care.   - Encourage visually impaired, hearing impaired and aphasic patients to use assistive/communication devices  Outcome: Progressing     Problem: CARDIOVASCULAR - ADULT  Goal: Maintains optimal cardiac output and hemodynamic stability  Description: INTERVENTIONS:  - Monitor I/O, vital signs and rhythm  - Monitor for S/S and trends of decreased cardiac output  - Administer and titrate ordered vasoactive medications to optimize hemodynamic stability  - Assess quality of pulses, skin color and temperature  - Assess  for signs of decreased coronary artery perfusion  - Instruct patient to report change in severity of symptoms  Outcome: Progressing  Goal: Absence of cardiac dysrhythmias or at baseline rhythm  Description: INTERVENTIONS:  - Continuous cardiac monitoring, vital signs, obtain 12 lead EKG if ordered  - Administer antiarrhythmic and heart rate control medications as ordered  - Monitor electrolytes and administer replacement therapy as ordered  Outcome: Progressing     Problem: RESPIRATORY - ADULT  Goal: Achieves optimal ventilation and oxygenation  Description: INTERVENTIONS:  - Assess for changes in respiratory status  - Assess for changes in mentation and behavior  - Position to facilitate oxygenation and minimize respiratory effort  - Oxygen administered by appropriate delivery if ordered  - Initiate smoking cessation education as indicated  - Encourage broncho-pulmonary hygiene including cough, deep breathe, Incentive Spirometry  - Assess the need for suctioning and aspirate as needed  - Assess and instruct to report SOB or any respiratory difficulty  - Respiratory Therapy support as indicated  Outcome: Progressing     Problem: METABOLIC, FLUID AND ELECTROLYTES - ADULT  Goal: Electrolytes maintained within normal limits  Description: INTERVENTIONS:  - Monitor labs and assess patient for signs and symptoms of electrolyte imbalances  - Administer electrolyte replacement as ordered  - Monitor response to electrolyte replacements, including repeat lab results as appropriate  - Instruct patient on fluid and nutrition as appropriate  Outcome: Progressing     Problem: HEMATOLOGIC - ADULT  Goal: Maintains hematologic stability  Description: INTERVENTIONS  - Assess for signs and symptoms of bleeding or hemorrhage  - Monitor labs  - Administer supportive blood products/factors as ordered and appropriate  Outcome: Progressing

## 2025-06-03 NOTE — H&P
H&P - Hospitalist   Name: Kevin Schmitz 79 y.o. male I MRN: [id number]  Unit/Bed#: ED 12 I Date of Admission: 6/3/2025   Date of Service: 6/3/2025 I Hospital Day: 0     Assessment & Plan  Facial paresthesia  Patient reported left sided facial paresthesias starting morning of admission.  CT brain: No mass effect, acute intracranial hemorrhage or evidence of recent infarction.  CTA head/neck: No evidence for high-grade stenosis, focal occlusion or vascular aneurysm of the cervical intracranial vessels.  Admit under stroke pathway  Continue aspirin and statin  MRI brain  ECHO with bubble study  Update HbA1c and lipid panel  Monitor on telemetry  Neuro checks  Speech/PT/OT  Neurology consulted  Saphenous vein thrombophlebitis  Lower extremity dopplers done in ED due to LLE pain and swelling x months  Showed evidence of subacute vs chronic occlusive superficial thrombophlebitis in the small saphenous at the mid-calf  Hx left peroneal vein DVT in August 2023 after dropping wooden board on his leg and completed 3 months of Eliquis  Followed with hematology and had unremarkable hypercoagulable work up  Anticoagulation was discontinued at that time given provoked first thrombotic event and radiographic resolution on imaging  Will consult hematology for anticoagulation recommendations, continue with antiplatelet and DVT prophylaxis with Lovenox for now  Essential hypertension  Home regimen includes amlodipine 10 mg daily, spironolactone-HCTZ 25-25 mg daily, and Coreg 6.25 mg daily  Hold to allow for permissive hypertension  Hypercholesterolemia  Continue statin  Benign prostatic hyperplasia with urinary obstruction  On alfuzosin  Malignant neoplasm of pancreas, unspecified location of malignancy (HCC)  Hx pancreatic neuroendocrine tumor s/p Whipple procedure, follows at [facility]    VTE Pharmacologic Prophylaxis: VTE Score: 6 High Risk (Score >/= 5) - Pharmacological DVT Prophylaxis Ordered: enoxaparin (Lovenox).  "Sequential Compression Devices Ordered.  Code Status: No Order   Discussion with family: Updated  (wife) at bedside.    Anticipated Length of Stay: Patient will be admitted on an observation basis with an anticipated length of stay of less than 2 midnights secondary to facial paresthesias, stroke pathway.    History of Present Illness   Chief Complaint: facial tingling    Kevin Schmitz is a 79 y.o. male with a PMH of HTN, HLD, BPH, pancreatic neuroendocrine tumor s/p Whipple procedure who presents with left sided facial tingling.  Patient reports he woke up with left sided neck pain and left sided facial tingling over his left cheek into lower half of his face. He reports it has slowly been improving since this morning and now almost completely resolved. Neck pain has also resolved. Denies previous similar episodes. He also reports history of DVT in 2023 and completed course of Eliquis and states since that time he has had left leg pain in his thigh, knee, and calf. He also states he has intermittent lower extremity swelling that is worse on the left. He denies dizziness, chest pain, shortness of breath, nausea, vomiting, abdominal pain, vision changes, difficulty swallowing/speaking, facial droop, confusion, weakness, difficulty ambulating.     Review of Systems   Constitutional:  Negative for chills and fever.   HENT:  Negative for ear pain and sore throat.    Eyes:  Negative for pain and visual disturbance.   Respiratory:  Negative for cough and shortness of breath.    Cardiovascular:  Negative for chest pain and palpitations.   Gastrointestinal:  Negative for abdominal pain and vomiting.   Genitourinary:  Negative for dysuria and hematuria.   Musculoskeletal:  Negative for arthralgias and back pain.   Skin:  Negative for color change and rash.   Neurological:  Positive for numbness (L sided facial \"tingling\"). Negative for seizures and syncope.   All other systems reviewed and are " negative.    Historical Information   Past Medical History[1]  Past Surgical History[2]  Social History[3]  E-Cigarette/Vaping    E-Cigarette Use Never User      E-Cigarette/Vaping Substances    Nicotine No     THC No     CBD No     Flavoring No     Other No     Unknown No      Family History[4]  Social History:  Marital Status: /Civil Union   Occupation: retired  Patient Pre-hospital Living Situation: Home  Patient Pre-hospital Level of Mobility: walks  Patient Pre-hospital Diet Restrictions: none    Meds/Allergies   I have reviewed home medications with patient personally.  Prior to Admission medications    Medication Sig Start Date End Date Taking? Authorizing Provider   alfuzosin (UROXATRAL) 10 mg 24 hr tablet Take 1 tablet (10 mg total) by mouth daily 12/4/24   Jc Samuels PA-C   amLODIPine (NORVASC) 10 mg tablet TAKE 1 TABLET BY MOUTH DAILY 5/14/25   Andrei Small MD   Ascorbic Acid (vitamin C) 1000 MG tablet Take 1,000 mg by mouth in the morning.    Historical Provider, MD   aspirin 81 mg chewable tablet Chew 81 mg in the morning.    Historical Provider, MD   carvedilol (COREG) 6.25 mg tablet TAKE 2 TABLETS BY MOUTH TWICE  DAILY 2/3/25   Andrei Small MD   cholecalciferol (VITAMIN D3) 1,000 units tablet Take 5,000 Units by mouth in the morning.    Historical Provider, MD   DOXYCYCLINE PO Take by mouth  Patient not taking: Reported on 5/19/2025    Historical Provider, MD   rosuvastatin (CRESTOR) 5 mg tablet TAKE 1 TABLET BY MOUTH DAILY 4/30/25   Andrei Small MD   spironolactone-hydrochlorothiazide (ALDACTAZIDE) 25-25 mg per tablet TAKE ONE-HALF TABLET BY MOUTH  DAILY 5/10/24   Andrei Small MD   tadalafil (CIALIS) 5 MG tablet Take 1 tablet (5 mg total) by mouth daily as needed for erectile dysfunction 12/4/24   Jc Samuels PA-C   triamcinolone (KENALOG) 0.1 % cream Apply topically 2 (two) times a day 9/3/24   Kashmir Oleary, DO     No Known Allergies    Objective  ":  Temp:  [97.8 °F (36.6 °C)-97.9 °F (36.6 °C)] 97.9 °F (36.6 °C)  HR:  [63-72] 63  BP: (133-149)/(64-73) 133/64  Resp:  [18-19] 18  SpO2:  [95 %-96 %] 95 %  O2 Device: None (Room air)    Physical Exam  Vitals and nursing note reviewed.   Constitutional:       General: He is not in acute distress.     Appearance: He is well-developed.     Cardiovascular:      Rate and Rhythm: Normal rate and regular rhythm.   Pulmonary:      Effort: Pulmonary effort is normal. No respiratory distress.      Breath sounds: Normal breath sounds.   Abdominal:      Palpations: Abdomen is soft.      Tenderness: There is no abdominal tenderness.     Musculoskeletal:         General: No swelling.      Right lower leg: No edema.      Left lower leg: No edema.     Skin:     General: Skin is warm and dry.      Capillary Refill: Capillary refill takes less than 2 seconds.     Neurological:      General: No focal deficit present.      Mental Status: He is alert and oriented to person, place, and time.      Sensory: No sensory deficit.      Motor: No weakness.      Comments: Reports facial paresthesias currently resolved   Psychiatric:         Mood and Affect: Mood normal.          Lines/Drains:        Lab Results: I have reviewed the following results:  Results from last 7 days   Lab Units 06/03/25  1204   WBC Thousand/uL 4.88   HEMOGLOBIN g/dL 14.2   HEMATOCRIT % 42.0   PLATELETS Thousands/uL 256   SEGS PCT % 51   LYMPHO PCT % 26   MONO PCT % 17*   EOS PCT % 5     Results from last 7 days   Lab Units 06/03/25  1204   SODIUM mmol/L 138   POTASSIUM mmol/L 4.0   CHLORIDE mmol/L 105   CO2 mmol/L 23   BUN mg/dL 16   CREATININE mg/dL 0.77   ANION GAP mmol/L 10   CALCIUM mg/dL 8.8   GLUCOSE RANDOM mg/dL 123             No results found for: \"HGBA1C\"        Imaging Results Review: I reviewed radiology reports from this admission including: CT head.  Other Study Results Review: No additional pertinent studies reviewed.    Administrative Statements "     ** Please Note: This note has been constructed using a voice recognition system. **         [1]   Past Medical History:  Diagnosis Date    BEP (benign enlargement of prostate)     Cancer (HCC)     pancreatic   whipple procedure  basal cell    Cardiac murmur     Colon polyp     DVT (deep venous thrombosis) (HCC)     left leg    Erectile dysfunction     HL (hearing loss) 2020    Hyperlipidemia     Hypertension     Positive colorectal cancer screening using Cologuard test 01/2021    PVC (premature ventricular contraction) 1990's    hx of, most recent stress test 2018 - normal results per pt    Right bundle branch block     Tinnitus 2015   [2]   Past Surgical History:  Procedure Laterality Date    APPENDECTOMY      COLONOSCOPY      HERNIA REPAIR      right inguinal x2 and left inguinal x1    PANCREAS SURGERY      ME TRURL ELECTROSURG RESCJ PROSTATE BLEED COMPLETE N/A 3/19/2025    Procedure: TRANSURETHRAL RESECTION OF PROSTATE (TURP);  Surgeon: Arturo Aguila MD;  Location: [facility];  Service: Urology    SPLENECTOMY, TOTAL      WHIPPLE PROCEDURE/PANCREATICO-DUODENECTOMY     [3]   Social History  Tobacco Use    Smoking status: Never    Smokeless tobacco: Never   Vaping Use    Vaping status: Never Used   Substance and Sexual Activity    Alcohol use: Yes     Alcohol/week: 10.0 standard drinks of alcohol     Types: 10 Cans of beer per week     Comment: occasional    Drug use: No    Sexual activity: Not Currently   [4]   Family History  Problem Relation Name Age of Onset    Hypertension Mother Stephanie schmitz     Hypertension Father Nilesh Schmitz     Cancer Father Nilesh Schmitz     Cancer Daughter Angelia

## 2025-06-03 NOTE — ASSESSMENT & PLAN NOTE
Hx pancreatic neuroendocrine tumor s/p Whipple procedure, follows at [facility]

## 2025-06-03 NOTE — ASSESSMENT & PLAN NOTE
Lower extremity dopplers done in ED due to LLE pain and swelling x months  Showed evidence of subacute vs chronic occlusive superficial thrombophlebitis in the small saphenous at the mid-calf  Hx left peroneal vein DVT in August 2023 after dropping wooden board on his leg and completed 3 months of Eliquis  Followed with hematology and had unremarkable hypercoagulable work up  Anticoagulation was discontinued at that time given provoked first thrombotic event and radiographic resolution on imaging  Will consult hematology for anticoagulation recommendations, continue with antiplatelet and DVT prophylaxis with Lovenox for now

## 2025-06-03 NOTE — ED NOTES
Dr. Amaro evaluating patient - does not want a stroke alert called at this time.     Sofy Rico RN  06/03/25 [id number]

## 2025-06-03 NOTE — ASSESSMENT & PLAN NOTE
Home regimen includes amlodipine 10 mg daily, spironolactone-HCTZ 25-25 mg daily, and Coreg 6.25 mg daily  Hold to allow for permissive hypertension

## 2025-06-03 NOTE — ASSESSMENT & PLAN NOTE
Patient reported left sided facial paresthesias starting morning of admission.  CT brain: No mass effect, acute intracranial hemorrhage or evidence of recent infarction.  CTA head/neck: No evidence for high-grade stenosis, focal occlusion or vascular aneurysm of the cervical intracranial vessels.  Admit under stroke pathway  Continue aspirin and statin  MRI brain  ECHO with bubble study  Update HbA1c and lipid panel  Monitor on telemetry  Neuro checks  Speech/PT/OT  Neurology consulted

## 2025-06-03 NOTE — ED NOTES
Dr. Amaro giving update to the patient.  RN drawing second troponin     Brianne Juarez RN  06/03/25 [id number]

## 2025-06-04 ENCOUNTER — APPOINTMENT (OUTPATIENT)
Dept: NON INVASIVE DIAGNOSTICS | Facility: HOSPITAL | Age: 80
End: 2025-06-04
Payer: COMMERCIAL

## 2025-06-04 ENCOUNTER — APPOINTMENT (OUTPATIENT)
Dept: RADIOLOGY | Facility: HOSPITAL | Age: 80
End: 2025-06-04
Payer: COMMERCIAL

## 2025-06-04 VITALS
BODY MASS INDEX: 23.33 KG/M2 | RESPIRATION RATE: 16 BRPM | SYSTOLIC BLOOD PRESSURE: 132 MMHG | HEART RATE: 70 BPM | OXYGEN SATURATION: 95 % | WEIGHT: 176 LBS | DIASTOLIC BLOOD PRESSURE: 63 MMHG | TEMPERATURE: 97.8 F | HEIGHT: 73 IN

## 2025-06-04 DIAGNOSIS — M43.6 NECK STIFFNESS: Primary | ICD-10-CM

## 2025-06-04 DIAGNOSIS — M50.30 DDD (DEGENERATIVE DISC DISEASE), CERVICAL: ICD-10-CM

## 2025-06-04 PROBLEM — R20.2 FACIAL PARESTHESIA: Status: RESOLVED | Noted: 2025-06-03 | Resolved: 2025-06-04

## 2025-06-04 LAB
ANION GAP SERPL CALCULATED.3IONS-SCNC: 11 MMOL/L (ref 4–13)
AORTIC ROOT: 3.8 CM
ATRIAL RATE: 63 BPM
AV LVOT PEAK GRADIENT: 5 MMHG
AV PEAK GRADIENT: 8 MMHG
BSA FOR ECHO PROCEDURE: 2.04 M2
BUN SERPL-MCNC: 14 MG/DL (ref 5–25)
CALCIUM SERPL-MCNC: 8.5 MG/DL (ref 8.4–10.2)
CHLORIDE SERPL-SCNC: 105 MMOL/L (ref 96–108)
CHOLEST SERPL-MCNC: 141 MG/DL (ref ?–200)
CO2 SERPL-SCNC: 20 MMOL/L (ref 21–32)
CREAT SERPL-MCNC: 0.7 MG/DL (ref 0.6–1.3)
DOP CALC LVOT AREA: 3.14 CM2
DOP CALC LVOT DIAMETER: 2 CM
E WAVE DECELERATION TIME: 251 MS
E/A RATIO: 0.86
ERYTHROCYTE [DISTWIDTH] IN BLOOD BY AUTOMATED COUNT: 13.6 % (ref 11.6–15.1)
EST. AVERAGE GLUCOSE BLD GHB EST-MCNC: 123 MG/DL
FRACTIONAL SHORTENING: 34 (ref 28–44)
GFR SERPL CREATININE-BSD FRML MDRD: 91 ML/MIN/1.73SQ M
GLUCOSE P FAST SERPL-MCNC: 95 MG/DL (ref 65–99)
GLUCOSE SERPL-MCNC: 95 MG/DL (ref 65–140)
HBA1C MFR BLD: 5.9 %
HCT VFR BLD AUTO: 40.7 % (ref 36.5–49.3)
HCV AB SER QL: NORMAL
HDLC SERPL-MCNC: 46 MG/DL
HGB BLD-MCNC: 14 G/DL (ref 12–17)
INTERVENTRICULAR SEPTUM IN DIASTOLE (PARASTERNAL SHORT AXIS VIEW): 1 CM
INTERVENTRICULAR SEPTUM: 1 CM (ref 0.6–1.1)
LAAS-AP2: 23.2 CM2
LAAS-AP4: 25.3 CM2
LDLC SERPL CALC-MCNC: 77 MG/DL (ref 0–100)
LEFT ATRIUM AREA SYSTOLE SINGLE PLANE A4C: 25.9 CM2
LEFT ATRIUM SIZE: 3.9 CM
LEFT ATRIUM VOLUME (MOD BIPLANE): 79 ML
LEFT ATRIUM VOLUME INDEX (MOD BIPLANE): 39.1 ML/M2
LEFT INTERNAL DIMENSION IN SYSTOLE: 3.1 CM (ref 2.1–4)
LEFT VENTRICULAR INTERNAL DIMENSION IN DIASTOLE: 4.7 CM (ref 3.5–6)
LEFT VENTRICULAR POSTERIOR WALL IN END DIASTOLE: 0.9 CM
LEFT VENTRICULAR STROKE VOLUME: 66 ML
LVSV (TEICH): 66 ML
MCH RBC QN AUTO: 31.7 PG (ref 26.8–34.3)
MCHC RBC AUTO-ENTMCNC: 34.4 G/DL (ref 31.4–37.4)
MCV RBC AUTO: 92 FL (ref 82–98)
MV E'TISSUE VEL-LAT: 10 CM/S
MV E'TISSUE VEL-SEP: 9 CM/S
MV PEAK A VEL: 0.99 M/S
MV PEAK E VEL: 85 CM/S
MV STENOSIS PRESSURE HALF TIME: 73 MS
MV VALVE AREA P 1/2 METHOD: 3.01
P AXIS: 38 DEGREES
PLATELET # BLD AUTO: 259 THOUSANDS/UL (ref 149–390)
PMV BLD AUTO: 9.7 FL (ref 8.9–12.7)
POTASSIUM SERPL-SCNC: 3.5 MMOL/L (ref 3.5–5.3)
PR INTERVAL: 180 MS
PV PEAK GRADIENT: 4 MMHG
QRS AXIS: -21 DEGREES
QRSD INTERVAL: 100 MS
QT INTERVAL: 414 MS
QTC INTERVAL: 423 MS
RBC # BLD AUTO: 4.42 MILLION/UL (ref 3.88–5.62)
RIGHT ATRIUM AREA SYSTOLE A4C: 23.5 CM2
RIGHT VENTRICLE ID DIMENSION: 2.9 CM
SL CV LEFT ATRIUM LENGTH A2C: 5.9 CM
SL CV PED ECHO LEFT VENTRICLE DIASTOLIC VOLUME (MOD BIPLANE) 2D: 104 ML
SL CV PED ECHO LEFT VENTRICLE SYSTOLIC VOLUME (MOD BIPLANE) 2D: 38 ML
SODIUM SERPL-SCNC: 136 MMOL/L (ref 135–147)
T WAVE AXIS: 42 DEGREES
TR MAX PG: 13 MMHG
TR PEAK VELOCITY: 1.8 M/S
TRICUSPID ANNULAR PLANE SYSTOLIC EXCURSION: 1.9 CM
TRICUSPID VALVE PEAK REGURGITATION VELOCITY: 1.8 M/S
TRIGL SERPL-MCNC: 88 MG/DL (ref ?–150)
VENTRICULAR RATE: 63 BPM
WBC # BLD AUTO: 5.42 THOUSAND/UL (ref 4.31–10.16)

## 2025-06-04 PROCEDURE — 99239 HOSP IP/OBS DSCHRG MGMT >30: CPT | Performed by: PHYSICIAN ASSISTANT

## 2025-06-04 PROCEDURE — 70553 MRI BRAIN STEM W/O & W/DYE: CPT

## 2025-06-04 PROCEDURE — 80061 LIPID PANEL: CPT

## 2025-06-04 PROCEDURE — 93306 TTE W/DOPPLER COMPLETE: CPT | Performed by: INTERNAL MEDICINE

## 2025-06-04 PROCEDURE — 83036 HEMOGLOBIN GLYCOSYLATED A1C: CPT

## 2025-06-04 PROCEDURE — 99204 OFFICE O/P NEW MOD 45 MIN: CPT | Performed by: PSYCHIATRY & NEUROLOGY

## 2025-06-04 PROCEDURE — 99223 1ST HOSP IP/OBS HIGH 75: CPT | Performed by: PHYSICIAN ASSISTANT

## 2025-06-04 PROCEDURE — 80048 BASIC METABOLIC PNL TOTAL CA: CPT

## 2025-06-04 PROCEDURE — 97161 PT EVAL LOW COMPLEX 20 MIN: CPT

## 2025-06-04 PROCEDURE — 93010 ELECTROCARDIOGRAM REPORT: CPT | Performed by: INTERNAL MEDICINE

## 2025-06-04 PROCEDURE — 85027 COMPLETE CBC AUTOMATED: CPT

## 2025-06-04 PROCEDURE — 93306 TTE W/DOPPLER COMPLETE: CPT

## 2025-06-04 PROCEDURE — A9585 GADOBUTROL INJECTION: HCPCS | Performed by: PHYSICIAN ASSISTANT

## 2025-06-04 PROCEDURE — 86803 HEPATITIS C AB TEST: CPT

## 2025-06-04 RX ORDER — GADOBUTROL 604.72 MG/ML
8 INJECTION INTRAVENOUS
Status: COMPLETED | OUTPATIENT
Start: 2025-06-04 | End: 2025-06-04

## 2025-06-04 RX ORDER — ASPIRIN 81 MG/1
324 TABLET, CHEWABLE ORAL DAILY
Qty: 120 TABLET | Refills: 0 | Status: SHIPPED | OUTPATIENT
Start: 2025-06-04

## 2025-06-04 RX ADMIN — ENOXAPARIN SODIUM 40 MG: 40 INJECTION SUBCUTANEOUS at 08:22

## 2025-06-04 RX ADMIN — LORAZEPAM 0.5 MG: 2 INJECTION INTRAMUSCULAR; INTRAVENOUS at 09:45

## 2025-06-04 RX ADMIN — ASPIRIN 81 MG: 81 TABLET, CHEWABLE ORAL at 08:22

## 2025-06-04 RX ADMIN — GADOBUTROL 8 ML: 604.72 INJECTION INTRAVENOUS at 10:43

## 2025-06-04 RX ADMIN — ATORVASTATIN CALCIUM 40 MG: 40 TABLET, FILM COATED ORAL at 17:26

## 2025-06-04 NOTE — ASSESSMENT & PLAN NOTE
Patient reported left sided facial paresthesias starting morning of admission. Admitted under stroke pathway.   CT brain: No mass effect, acute intracranial hemorrhage or evidence of recent infarction.  CTA head/neck: No evidence for high-grade stenosis, focal occlusion or vascular aneurysm of the cervical intracranial vessels.  MRI brain: No acute infarct, significant mass effect or midline shift.   Echo with bubble: No evidence of thrombus, atrial fibrillation, PFO. LVEF 60-65%  Continue aspirin 325mg QD and statin on discharge   Lipid panel WNL   HbA1C 5.9% prediabetic   Counseled patient on diet and exercise  Recommend PCP follow-up on discharge   Speech/PT/OT consulted - no acute needs at this time  Neurology consulted, will place outpatient referral for follow-up   MRI cervical spine w/o contrast outpatient   ZIO patch for 30 days  Neuro f/u in 8-10wks

## 2025-06-04 NOTE — ASSESSMENT & PLAN NOTE
79 y.o. with HTN, HLD, PVCs, BPH, sensorineural hearing loss, and history of primary pancreatic neuroendocrine tumor s/p Whipple procedure in 2016, history of DVT in 2023 who presents with complaint of L facial tingling/numbness and L sided neck discomfort.    Neuroimaging   6/3/2025 CTA head and neck with and without contrast:   No mass effect, acute intracranial hemorrhage, or evidence of recent infarction.   No evidence for high-grade stenosis, focal occlusion, or vascular aneurysm of the cervical or intracranial vessels.    Plan:  - Continue on acute ischemic stroke pathway.  - MRI brain pending  - Echocardiogram pending  - Monitor on telemetry.   - Lipid panel reviewed, total cholesterol 141, triglycerides 88, HDL 46, LDL 77.  - Hemoglobin A1c pending.  - Continue on ASA 81 mg QD.   - Continue on atorvastatin 40 mg QPM.  - Allow for permissive hypertension with BP goal <220/110. Treat with PRN antihypertensives.   - Goal normothermia and euglycemia.   - PT/OT  - Stroke education.   - Monitor exam and notify with changes.

## 2025-06-04 NOTE — ASSESSMENT & PLAN NOTE
08/24/24 1144   Appointment Info   Signing Clinician's Name / Credentials (PT) Ebony Roger DPT   Quick Adds   Quick Adds Certification   Living Environment   People in Home alone   Current Living Arrangements apartment   Home Accessibility stairs to enter home   Number of Stairs, Main Entrance 1   Stair Railings, Main Entrance none   Transportation Anticipated family or friend will provide   Living Environment Comments Pt lives alone in an apartment with 1 curb to enter from the parking lot   Self-Care   Usual Activity Tolerance moderate   Current Activity Tolerance poor   Regular Exercise No   Equipment Currently Used at Home walker, rolling   Fall history within last six months yes   Activity/Exercise/Self-Care Comment pt mod I w/ 4ww (has in rm), has cleaning assist. Assist w/ showering. Reports her apartment is IND living and not attached to [facility] however pt questionable hisotrian   General Information   Onset of Illness/Injury or Date of Surgery 08/23/24   Referring Physician Tegan Nguyen MD   Pertinent History of Current Problem (include personal factors and/or comorbidities that impact the POC) Moira Andre is a 89 year old female with PMH of diastolic CHF, CKD4, HTN, recurrent falls, atrial fibrillation s/p ablation and pacemaker, ASCVD, DM2, depression, GERD, asthma, who presents with a fall.   Existing Precautions/Restrictions fall   Weight-Bearing Status - LLE weight-bearing as tolerated   Weight-Bearing Status - RLE weight-bearing as tolerated   Cognition   Affect/Mental Status (Cognition) confused   Cognitive Status Comments tearful throughout session   Pain Assessment   Patient Currently in Pain Yes, see Vital Sign flowsheet  (diffuse pain, stating it moves around. Mostly R hip during eval)   Integumentary/Edema   Integumentary/Edema Comments brusing to forehead, see nursing notes for details   Posture    Posture Forward head position   Range of Motion (ROM)   ROM Comment BLE WFL   Strength  Continue statin   (Manual Muscle Testing)   Strength Comments Pt demonstrate gross functional weakness w/ OOB mobility   Bed Mobility   Comment, (Bed Mobility) mod A x 2   Transfers   Comment, (Transfers) FWW mod A x 1   Gait/Stairs (Locomotion)   Comment, (Gait/Stairs) Unable to tolerate ambulation   Balance   Balance Comments Impaired dynamic balance from baseline   Clinical Impression   Criteria for Skilled Therapeutic Intervention Yes, treatment indicated   PT Diagnosis (PT) impaired IND with mobility from baseline   Influenced by the following impairments functional weakness, age, imipaired balance, pain, impaired activity tolerance   Functional limitations due to impairments see above   Clinical Presentation (PT Evaluation Complexity) evolving   Clinical Presentation Rationale lives alone, clinical judgement   Clinical Decision Making (Complexity) moderate complexity   Planned Therapy Interventions (PT) balance training;bed mobility training;gait training;home exercise program;neuromuscular re-education;patient/family education;stair training;strengthening;ROM (range of motion);transfer training;progressive activity/exercise;home program guidelines   Risk & Benefits of therapy have been explained evaluation/treatment results reviewed;care plan/treatment goals reviewed;risks/benefits reviewed;current/potential barriers reviewed;participants voiced agreement with care plan;participants included;patient   PT Total Evaluation Time   PT Eval, Moderate Complexity Minutes (31914) 10   Therapy Certification   Start of care date 08/23/24   Certification date from 08/23/24   Certification date to 08/31/24   Medical Diagnosis Weakness, fall   Physical Therapy Goals   PT Frequency 5x/week   PT Predicted Duration/Target Date for Goal Attainment 08/31/24   PT: Bed Mobility Independent;Supine to/from sit   PT: Transfers Modified independent;Sit to/from stand;Bed to/from chair;Assistive device   PT: Gait Modified independent;Assistive  device;150 feet   PT: Stairs Supervision/stand-by assist;1 stair;Assistive device   PT Discharge Planning   PT Plan Initiate gait w/ WC fofllow   PT Discharge Recommendation (DC Rec) Transitional Care Facility   PT Rationale for DC Rec Pt far below baseline at this time, emotionally labile throughout session. Pt currently limited by pain, weakness, impaired activity tolerance. Currently pt mod A x 1 w/ FWW, unable to ambulate this AM, recommend DC to TCU to improve IND w/ mobility. Pt will likely need higher level of care in near future   PT Brief overview of current status Goals of therapy will be to address safe mobility and make recommendations for discharge to next level of care. Patient and RN will continue to follow all falls risk precautions as documented by RN staff while hospitalized.  Mod A x 1 FWW   Total Session Time   Total Session Time (sum of timed and untimed services) [address] [place] [address]  OUTPATIENT PHYSICAL THERAPY EVALUATION  PLAN OF TREATMENT FOR OUTPATIENT REHABILITATION  (COMPLETE FOR INITIAL CLAIMS ONLY)  Patient's Last Name, First Name, M.I.  YOB: 1931  Moira Andre                        Provider's Name  [facility] Medical Record No.  [id number]                             Onset Date:  08/23/24   Start of Care Date:  08/23/24   Type:     _X_PT   ___OT   ___SLP Medical Diagnosis:  Weakness, fall              PT Diagnosis:  impaired IND with mobility from baseline Visits from SOC:  1     See note for plan of treatment, functional goals and certification details    I CERTIFY THE NEED FOR THESE SERVICES FURNISHED UNDER        THIS PLAN OF TREATMENT AND WHILE UNDER MY CARE     (Physician co-signature of this document indicates review and certification of the therapy plan).

## 2025-06-04 NOTE — CASE MANAGEMENT
Case Management Assessment & Discharge Planning Note    Patient name Kevin Schmitz  Location 4 [place] [facility]/4 [place] [facility]-* MRN [id number]  : 1945 Date 2025       Current Admission Date: 6/3/2025  Current Admission Diagnosis:Facial paresthesia   Patient Active Problem List    Diagnosis Date Noted    Facial paresthesia 2025    Saphenous vein thrombophlebitis 2025    History of DVT of lower extremity 2023    Malignant neoplasm of pancreas, unspecified location of malignancy (HCC) 2023    Pancreatic mass 2021    Incisional hernia 2021    Colon polyps 2021    Sensorineural hearing loss (SNHL), bilateral 2019    Status post splenectomy 2017    Cardiac murmur 2017    Hypercholesterolemia 2016    Benign prostatic hyperplasia with urinary obstruction 2016    Essential hypertension 2001      LOS (days): 0  Geometric Mean LOS (GMLOS) (days):   Days to GMLOS:     OBJECTIVE:              Current admission status: Observation  Referral Reason: Stroke    Preferred Pharmacy:   [facility] - [place] [address] [address]  Phone: [phone] Fax: [phone]    [facility] - [place], [place] - [address]  [address]  [facility] [address]  [facility] [address]  Phone: [phone] Fax: [phone]    [id number] - [place], [place] - [address] [facility]  [address] [facility] [address]  Phone: [phone] Fax: [phone]    Primary Care Provider: Kashmir Oleary DO    Primary Insurance: RIRI GARCIA  Secondary Insurance:     ASSESSMENT:  Active Health Care Proxies    There are no active Health Care Proxies on file.    Readmission Root Cause  30 Day Readmission: No    Patient Information  Admitted from:: Home  Mental Status: Alert  During Assessment patient was accompanied by: Spouse  Assessment  information provided by:: Patient, Spouse  Primary Caregiver: Self  Support Systems: Self, Spouse/significant other  County of Residence: [place]  What city do you live in?: [place], [place]  Type of Current Residence: 2 story home  Upon entering residence, is there a bedroom on the main floor (no further steps)?: No  A bedroom is located on the following floor levels of residence (select all that apply):: 2nd Floor  Upon entering residence, is there a bathroom on the main floor (no further steps)?: No  Indicate which floors of current residence have a bathroom (select all the apply):: 2nd Floor  Number of steps to 2nd floor from main floor: One Flight  Living Arrangements: Lives w/ Spouse/significant other    Activities of Daily Living Prior to Admission  Functional Status: Independent  Completes ADLs independently?: Yes  Ambulates independently?: Yes  Does patient use assisted devices?: No  Does patient currently own DME?: No  Does patient currently have HHC?: No     Patient Information Continued  Income Source: Pension/halfway  Does patient have prescription coverage?: Yes  Can the patient afford their medications and any related supplies (such as glucometers or test strips)?: Yes  Does patient receive dialysis treatments?: No     Means of Transportation  Means of Transport to Appts:: Drives Self    DISCHARGE DETAILS:    Discharge planning discussed with:: Patient, Spouse  Freedom of Choice: Yes  Comments - Freedom of Choice: SW met bedside with patient and spouse to introduce role, complete assessment, and discuss discharge plan. SW reviewed therapy recommendations for patient to discharge home with no rehab needs. Patient and spouse verbalized understanding and agreement with recommendation. Patient/spouse denied having any questions, concerns, or any anticipated discharge needs that SW can assist with at this time.  CM contacted family/caregiver?: Yes  Were Treatment Team discharge recommendations reviewed  with patient/caregiver?: Yes  Did patient/caregiver verbalize understanding of patient care needs?: Yes  Were patient/caregiver advised of the risks associated with not following Treatment Team discharge recommendations?: Yes    Contacts  Patient Contacts: Raya Justincharlie (spouse)  Relationship to Patient:: Family  Contact Method: In Person  Reason/Outcome: Continuity of Care, Emergency Contact, Discharge Planning    Requested Home Health Care         Is the patient interested in HHC at discharge?: No    DME Referral Provided  Referral made for DME?: No    Other Referral/Resources/Interventions Provided:  Interventions: None Indicated     Treatment Team Recommendation: Home  Discharge Destination Plan:: Home  Transport at Discharge : Family

## 2025-06-04 NOTE — ASSESSMENT & PLAN NOTE
Lower extremity dopplers done in ED due to LLE pain and swelling x months  Showed evidence of subacute vs chronic occlusive superficial thrombophlebitis in the small saphenous at the mid-calf  Hx left peroneal vein DVT in August 2023 after dropping wooden board on his leg and completed 3 months of Eliquis  Followed with hematology and had unremarkable hypercoagulable work up  Anticoagulation was discontinued at that time given provoked first thrombotic event and radiographic resolution on imaging  D/w hematology over Secure Chat, no indication for anticoagulation of saphenous vein thrombophlebitis  Continue symptomatic management upon discharge

## 2025-06-04 NOTE — OCCUPATIONAL THERAPY NOTE
OT EVALUATION       06/04/25 0739   Note Type   Note type Screen   Additional Comments Patient is independent with ADLS and functional mobility per PT.  No skilled OT needs required at this time.   Discharge Recommendation   Rehab Resource Intensity Level, OT No post-acute rehabilitation needs   Licensure   [place] License Number  Marian Buchanan MS OTR/L [id number]

## 2025-06-04 NOTE — SPEECH THERAPY NOTE
Consult received.  Records reviewed.  Pt admitted c facial paresthesias (concerning for CVA/TIA).  Pt passed RN and my screening Dysphagia Assessment.  Communication deficits denied and none noted in brief conversation. MRI results pending.  No additional inpatient Speech Pathology evaluation appears indicated at this time.    Zaina Ko MS,SLP  [place] License [id number]  PS New/?duplicate order received for Swallowing Evaluation.  Pt ingested tacos and thin liquid c no s/s or c/o oral/pharyngeal dysphagia. O additional f/u appears warranted.

## 2025-06-04 NOTE — NURSING NOTE
AVS reviewed with patient, all questions answered to satisfaction. Patient walked to lobby with wife and PCA.

## 2025-06-04 NOTE — PLAN OF CARE
Problem: PHYSICAL THERAPY ADULT  Goal: Performs mobility at highest level of function for planned discharge setting.  See evaluation for individualized goals.  Description:            See flowsheet documentation for full assessment, interventions and recommendations.  Outcome: Adequate for Discharge  Note: Prognosis: Good     Assessment: Patient seen for Physical Therapy evaluation. Patient admitted with Facial paresthesia.  Comorbidities affecting patient's physical performance include: HTN, HLD, BPH, pancreatic neuroendocrine tumor s/p Whipple procedure .  Patient now presents as independent with ADLs and mobility and symptoms have resolved. Personal factors affecting patient at time of initial evaluation include: no new functional deficits. Prior to admission, patient was independent with functional mobility without assistive device, independent with ADLS, independent with IADLS, ambulating household distance, ambulating community distances, and home with family assist.  Please find objective findings from Physical Therapy assessment regarding body systems outlined above with impairments and limitations including no new functional deficits.  The Barthel Index was used as a functional outcome tool presenting with a score of Barthel Index Score: 100 today indicating no limitations of functional mobility and ADLS.  Patient's clinical presentation is currently stable  as seen in patient's presentation of no new functional deficits. As demonstrated by objective findings, the assigned level of complexity for this evaluation is low.The patient's -[facility] Basic Mobility Inpatient Short Form Raw Score is 24. A Raw score of greater than 16 suggests the patient may benefit from discharge to home. Please also refer to the recommendation of the Physical Therapist for safe discharge planning.        Rehab Resource Intensity Level, PT: No post-acute rehabilitation needs    See flowsheet documentation for full assessment.

## 2025-06-04 NOTE — DISCHARGE SUMMARY
Discharge Summary - Hospitalist   Name: Kevin Schmitz 79 y.o. male I MRN: [id number]  Unit/Bed#: [address] [place] [address] I Date of Admission: 6/3/2025   Date of Service: 6/4/2025 I Hospital Day: 0     Assessment & Plan  Facial paresthesia  Patient reported left sided facial paresthesias starting morning of admission. Admitted under stroke pathway.   CT brain: No mass effect, acute intracranial hemorrhage or evidence of recent infarction.  CTA head/neck: No evidence for high-grade stenosis, focal occlusion or vascular aneurysm of the cervical intracranial vessels.  MRI brain: No acute infarct, significant mass effect or midline shift.   Echo with bubble: No evidence of thrombus, atrial fibrillation, PFO. LVEF 60-65%  Continue aspirin 325mg QD and statin on discharge   Lipid panel WNL   HbA1C 5.9% prediabetic   Counseled patient on diet and exercise  Recommend PCP follow-up on discharge   Speech/PT/OT consulted - no acute needs at this time  Neurology consulted, will place outpatient referral for follow-up   MRI cervical spine w/o contrast outpatient   ZIO patch for 30 days  Neuro f/u in 8-10wks  Saphenous vein thrombophlebitis  Lower extremity dopplers done in ED due to LLE pain and swelling x months  Showed evidence of subacute vs chronic occlusive superficial thrombophlebitis in the small saphenous at the mid-calf  Hx left peroneal vein DVT in August 2023 after dropping wooden board on his leg and completed 3 months of Eliquis  Followed with hematology and had unremarkable hypercoagulable work up  Anticoagulation was discontinued at that time given provoked first thrombotic event and radiographic resolution on imaging  D/w hematology over Secure Chat, no indication for anticoagulation of saphenous vein thrombophlebitis  Continue symptomatic management upon discharge   Essential hypertension  Home regimen includes amlodipine 10 mg daily, spironolactone-HCTZ 25-25 mg daily, and Coreg 6.25 mg daily  Hold to allow for  permissive hypertension while admitted  Can resume regimen on discharge   Hypercholesterolemia  Continue statin  Benign prostatic hyperplasia with urinary obstruction  Continue alfuzosin  Malignant neoplasm of pancreas, unspecified location of malignancy (HCC)  Hx pancreatic neuroendocrine tumor s/p Whipple procedure, follows [facility]     Medical Problems       Resolved Problems  Date Reviewed: 6/3/2025   None       Discharging Physician / Practitioner: Karen Buchanan PA-C  PCP: Kashmir Oleary,   Admission Date:   Admission Orders (From admission, onward)       Ordered        06/03/25 1437  Place in Observation  Once                          Discharge Date: 06/04/25    Next Steps for Physician/AP Assuming Care:  Neurology follow-up outpatient   PCP follow-up outpatient   Blood glucose monitoring   Blood pressure monitoring    Test Results Pending at Discharge (will require follow up):  None    Medication Changes for Discharge & Rationale:   None  Can continue previously prescribed medications   See after visit summary for reconciled discharge medications provided to patient and/or family.     Consultations During Hospital Stay:  Neurology    Procedures Performed:   None    Significant Findings / Test Results:   CT Brain: No mass effect, acute intracranial hemorrhage or evidence of recent infarction.   CT Angiography: No evidence for high-grade stenosis, focal occlusion or vascular aneurysm of the cervical or intracranial vessels.   MRI Brain: No acute infarct, significant mass effect or midline shift.   Echo with bubble study   Left ventricular cavity size is normal. Wall thickness is normal. The left ventricular ejection fraction is 60-65%.  GLS is -23.0%.  Systolic function is normal. Wall motion is normal. Diastolic function is mildly abnormal, consistent with grade I (abnormal) relaxation.  IVS: There is sigmoid appearance of the septum.    Incidental Findings:   None    Hospital Course:   Kevin ROSA  "Ainsley is a 79 y.o. male patient who originally presented to the hospital on 6/3/2025 due to left sided facial tingling.  Patient reported he woke up with left sided neck pain and left sided facial tingling over his left cheek into lower half of his face. Denied previous similar episodes. He also reports history of DVT in 2023 and completed course of Eliquis and states since that time he has had left leg pain in his thigh, knee, and calf. He also states he has intermittent lower extremity swelling that is worse on the left.     Patient admitted for stroke pathway and DVT rule-out. Stroke pathway ultimately unremarkable, as above. Lower extremity duplexes positive for LLE small saphenous vein thrombophlebitis. Discussed with hematology, no indication for anticoagulation. Neurology cleared patient for discharge home with outpatient follow-up.     Please see above list of diagnoses and related plan for additional information.     Discharge Day Visit / Exam:   Subjective:  Seen resting in bed. States pain and facial tingling has greatly improved. Denies disturbances of gait, recent memory loss or confusion.   Vitals: Blood Pressure: 132/63 (06/04/25 1529)  Pulse: 70 (06/04/25 1529)  Temperature: 97.8 °F (36.6 °C) (06/04/25 1529)  Temp Source: Oral (06/04/25 0800)  Respirations: 16 (06/04/25 0828)  Height: 6' 1\" (185.4 cm) (06/04/25 0924)  Weight - Scale: 79.8 kg (176 lb) (06/04/25 0924)  SpO2: 95 % (06/04/25 1529)  Physical Exam  Constitutional:       General: He is not in acute distress.     Appearance: He is not ill-appearing, toxic-appearing or diaphoretic.   HENT:      Head: Normocephalic and atraumatic.      Nose: Nose normal.      Mouth/Throat:      Pharynx: Oropharynx is clear.     Eyes:      Conjunctiva/sclera: Conjunctivae normal.     Neck:      Vascular: No carotid bruit.     Cardiovascular:      Rate and Rhythm: Normal rate and regular rhythm.      Heart sounds: No murmur heard.     No friction rub. No " gallop.   Pulmonary:      Effort: Pulmonary effort is normal. No respiratory distress.      Breath sounds: No stridor. No wheezing, rhonchi or rales.   Abdominal:      General: Abdomen is flat. There is no distension.      Palpations: Abdomen is soft. There is no mass.     Musculoskeletal:         General: No swelling, tenderness, deformity or signs of injury.      Cervical back: Normal range of motion.     Skin:     General: Skin is warm and dry.      Coloration: Skin is not jaundiced or pale.     Neurological:      Mental Status: He is alert and oriented to person, place, and time. Mental status is at baseline.      Cranial Nerves: No dysarthria or facial asymmetry.      Sensory: No sensory deficit.      Motor: No weakness, tremor, atrophy, abnormal muscle tone, seizure activity or pronator drift.      Coordination: Coordination is intact.      Gait: Gait is intact.     Psychiatric:         Mood and Affect: Mood normal.         Behavior: Behavior normal.       Discussion with Family: Patient declined call to .     Discharge instructions/Information to patient and family:   See after visit summary for information provided to patient and family.      Provisions for Follow-Up Care:  See after visit summary for information related to follow-up care and any pertinent home health orders.      Mobility at time of Discharge:   Basic Mobility Inpatient Raw Score: 24  JH-HLM Goal: 8: Walk 250 feet or more  JH-HLM Achieved: 8: Walk 250 feet ot more  HLM Goal achieved. Continue to encourage appropriate mobility.     Disposition:   Home    Planned Readmission: N/a    Administrative Statements   Discharge Statement:  I have spent a total time of 45 minutes in caring for this patient on the day of the visit/encounter. .    **Please Note: This note may have been constructed using a voice recognition system**

## 2025-06-04 NOTE — UTILIZATION REVIEW
"Initial Clinical Review    Admission: Date/Time/Statement:   Admission Orders (From admission, onward)       Ordered        06/03/25 1437  Place in Observation  Once                          Orders Placed This Encounter   Procedures    Place in Observation     Standing Status:   Standing     Number of Occurrences:   1     Level of Care:   Level 2 Stepdown / HOT [14]     ED Arrival Information       Expected   -    Arrival   6/3/2025 11:36    Acuity   Emergent              Means of arrival   Walk-In    Escorted by   Spouse    Service   Hospitalist    Admission type   Emergency              Arrival complaint   Left Sided Facial Tingling             Chief Complaint   Patient presents with    Facial Numbness     Patient c/o left sided facial \"tingling\" that started at 0600 - states the sensation is almost back to normal.       Initial Presentation:   79 yom to ER from home left sided facial tingling/paresthesias; started this morning & improving, but still present. Also reports LLE pain. Exam: Very mild paresthesias to the left lower face.  No facial droop.  Rest of neurologic exam is unremarkable. Admission  venous duplex LLE: LLE with subacute vs chronic occlusive superficial thrombophlebitis in the small saphenous vein at the mid-calf. CTA head & neck neg.  Placed in observation status for facial paresthesia. Plan: neuro checks, neuro consult, therapies, telemetry    6/4/25- observation:  Per neuro: L facial numbness/tingling, L sided neck discomfort  Plan:  - Continue on acute ischemic stroke pathway.  - MRI brain pending  - Echocardiogram pending  - Monitor on telemetry.       ED Treatment-Medication Administration from 06/03/2025 1136 to 06/03/2025 1621         Date/Time Order Dose Route Action     06/03/2025 1318 iohexol (OMNIPAQUE) 350 MG/ML injection (MULTI-DOSE) 85 mL 85 mL Intravenous Given            Scheduled Medications:  [Held by provider] amLODIPine, 10 mg, Oral, Daily  aspirin, 81 mg, Oral, " Daily  atorvastatin, 40 mg, Oral, QPM  [Held by provider] carvedilol, 12.5 mg, Oral, BID  enoxaparin, 40 mg, Subcutaneous, Daily  [Held by provider] spironolactone-hydrochlorothiazide, 1.5 tablet, Oral, Daily      Continuous IV Infusions:     PRN Meds:  LORazepam, 0.5 mg, Intravenous, Once PRN      ED Triage Vitals [06/03/25 1145]   Temperature Pulse Respirations Blood Pressure SpO2 Pain Score   97.8 °F (36.6 °C) 72 19 149/73 96 % No Pain     Weight (last 2 days)       Date/Time Weight    06/03/25 1630 80.1 (176.59)    06/03/25 1145 80.1 (176.6)            Vital Signs (last 3 days)       Date/Time Temp Pulse Resp BP MAP (mmHg) SpO2 O2 Device Patient Position - Orthostatic VS [place] Coma Scale Score Pain    06/04/25 07:26:05 97.9 °F (36.6 °C) 71 -- 132/68 89 93 % -- -- -- --    06/04/25 04:35:50 -- 67 -- 136/68 91 93 % -- -- -- --    06/04/25 0330 -- -- -- -- -- -- -- -- 15 --    06/04/25 02:57:48 98 °F (36.7 °C) 70 14 117/69 85 92 % -- -- -- --    06/04/25 0235 -- 58 -- 113/61 78 94 % -- -- -- --    06/04/25 0035 -- 59 -- 109/60 76 92 % -- -- -- --    06/03/25 2230 -- 66 -- 127/68 88 95 % -- -- -- --    06/03/25 22:26:30 98.2 °F (36.8 °C) 65 18 127/68 88 93 % -- -- -- --    06/03/25 2200 98.2 °F (36.8 °C) 65 18 127/68 -- 93 % -- -- -- --    06/03/25 2035 -- 65 -- 136/68 91 95 % -- -- -- --    06/03/25 2000 98 °F (36.7 °C) 67 18 137/65 -- 93 % -- -- -- No Pain    06/03/25 19:40:53 98 °F (36.7 °C) 67 18 137/65 89 93 % -- -- -- --    06/03/25 1933 98 °F (36.7 °C) 67 18 137/65 -- 93 % -- -- -- --    06/03/25 1930 -- -- -- -- -- -- -- -- 15 --    06/03/25 1830 -- -- -- -- -- -- -- -- 15 --    06/03/25 1751 98 °F (36.7 °C) 73 18 127/99 -- 94 % -- -- -- --    06/03/25 17:50:27 98 °F (36.7 °C) 73 18 127/99 108 94 % -- -- -- --    06/03/25 1730 -- -- -- -- -- -- -- -- 15 --    06/03/25 1700 98 °F (36.7 °C) 73 18 127/99 -- 94 % -- -- -- --    06/03/25 16:33:06 97.6 °F (36.4 °C) 68 -- 150/68 95 97 % -- -- -- --    06/03/25  1630 -- -- -- -- -- -- -- -- 15 No Pain    06/03/25 1615 -- 63 14 138/67 95 99 % -- -- -- --    06/03/25 1609 -- -- -- -- 85 -- -- -- -- --    06/03/25 1600 97.6 °F (36.4 °C) 68 14 150/68 -- 97 % -- -- -- --    06/03/25 1543 97.6 °F (36.4 °C) 68 14 150/68 -- 97 % -- -- -- --    06/03/25 1338 97.9 °F (36.6 °C) 63 18 133/64 -- 95 % -- -- -- --    06/03/25 1330 -- -- -- -- -- -- None (Room air) -- -- --    06/03/25 1329 -- -- -- -- -- -- -- -- 15 No Pain    06/03/25 1145 97.8 °F (36.6 °C) 72 19 149/73 -- 96 % None (Room air) Sitting -- No Pain              Pertinent Labs/Diagnostic Test Results:   Radiology:  CTA head and neck with and without contrast   Final Interpretation by Panchito Rowlnad MD (06/03 1344)   CT Brain: No mass effect, acute intracranial hemorrhage or evidence of recent infarction.      CT Angiography: No evidence for high-grade stenosis, focal occlusion or vascular aneurysm of the cervical or intracranial vessels.            Workstation performed: [id number]         VAS lower limb venous duplex study, unilateral/limited   Final Interpretation by Salbador Talamantes MD (06/03 1506)      MRI brain w wo contrast    (Results Pending)     Cardiology:  ECG 12 lead    by Interface, Ris Results In (06/03 1157)        GI:  No orders to display           Results from last 7 days   Lab Units 06/04/25  0407 06/03/25  1204   WBC Thousand/uL 5.42 4.88   HEMOGLOBIN g/dL 14.0 14.2   HEMATOCRIT % 40.7 42.0   PLATELETS Thousands/uL 259 256   TOTAL NEUT ABS Thousands/µL  --  2.51         Results from last 7 days   Lab Units 06/04/25  0407 06/03/25  1204   SODIUM mmol/L 136 138   POTASSIUM mmol/L 3.5 4.0   CHLORIDE mmol/L 105 105   CO2 mmol/L 20* 23   ANION GAP mmol/L 11 10   BUN mg/dL 14 16   CREATININE mg/dL 0.70 0.77   EGFR ml/min/1.73sq m 91 87   CALCIUM mg/dL 8.5 8.8             Results from last 7 days   Lab Units 06/04/25  0407 06/03/25  1204   GLUCOSE RANDOM mg/dL 95 123             No results found for:  "\"BETA-HYDROXYBUTYRATE\"                   Results from last 7 days   Lab Units 06/03/25  1513 06/03/25  1204   HS TNI 0HR ng/L  --  3   HS TNI 2HR ng/L 3  --    HSTNI D2 ng/L 0  --                                                                                                                        Past Medical History[1]  Present on Admission:   Benign prostatic hyperplasia with urinary obstruction   Essential hypertension   Hypercholesterolemia   Malignant neoplasm of pancreas, unspecified location of malignancy (HCC)      Admitting Diagnosis: Saphenous vein thrombophlebitis [I80.00]  Facial numbness [R20.0]  History of DVT of lower extremity [Z86.718]  Facial paresthesia [R20.2]  Age/Sex: 79 y.o. male    Network Utilization Review Department  ATTENTION: Please call with any questions or concerns to [phone] and carefully listen to the prompts so that you are directed to the right person. All voicemails are confidential.   For Discharge needs, contact Care Management DC Support Team at [phone] opt. [phone]  Send all requests for admission clinical reviews, approved or denied determinations and any other requests to dedicated fax number below belonging to the [place] where the patient is receiving treatment. List of dedicated fax numbers for the Facilities:  FACILITY NAME UR FAX NUMBER   ADMISSION DENIALS (Administrative/Medical Necessity) [phone]   DISCHARGE SUPPORT TEAM (NETWORK) [phone]   PARENT CHILD HEALTH (Maternity/NICU/Pediatrics) [phone]   [facility] [phone]   [facility] [phone]   [facility] [phone]   [facility] [phone]   [facility] [phone]   [facility] [phone]   [facility] [phone]   [facility] [phone]   [facility] [phone]   [facility] [phone]   [facility] [phone]   [facility] [phone]              [1]   Past Medical History:  Diagnosis Date    BEP (benign enlargement of prostate)     Cancer (HCC)     pancreatic   whipple procedure  basal cell    Cardiac murmur     Colon polyp     DVT (deep venous thrombosis) (HCC)     left leg    Erectile dysfunction     HL (hearing loss) 2020    Hyperlipidemia     Hypertension     Positive colorectal cancer screening using Cologuard test 01/2021    PVC (premature ventricular contraction) 1990's    hx of, most recent stress test 2018 - normal results per pt    Right bundle branch block     Tinnitus 2015

## 2025-06-04 NOTE — ASSESSMENT & PLAN NOTE
Hx pancreatic neuroendocrine tumor s/p Whipple procedure, follows at [facility]

## 2025-06-04 NOTE — PLAN OF CARE
Problem: PAIN - ADULT  Goal: Verbalizes/displays adequate comfort level or baseline comfort level  Description: Interventions:  - Encourage patient to monitor pain and request assistance  - Assess pain using appropriate pain scale  - Administer analgesics as ordered based on type and severity of pain and evaluate response  - Implement non-pharmacological measures as appropriate and evaluate response  - Consider cultural and social influences on pain and pain management  - Notify physician/advanced practitioner if interventions unsuccessful or patient reports new pain  - Educate patient/family on pain management process including their role and importance of  reporting pain   - Provide non-pharmacologic/complimentary pain relief interventions  Outcome: Progressing     Problem: INFECTION - ADULT  Goal: Absence or prevention of progression during hospitalization  Description: INTERVENTIONS:  - Assess and monitor for signs and symptoms of infection  - Monitor lab/diagnostic results  - Monitor all insertion sites, i.e. indwelling lines, tubes, and drains  - Monitor endotracheal if appropriate and nasal secretions for changes in amount and color  - [place] appropriate cooling/warming therapies per order  - Administer medications as ordered  - Instruct and encourage patient and family to use good hand hygiene technique  - Identify and instruct in appropriate isolation precautions for identified infection/condition  Outcome: Progressing  Goal: Absence of fever/infection during neutropenic period  Description: INTERVENTIONS:  - Monitor WBC  - Perform strict hand hygiene  - Limit to healthy visitors only  - No plants, dried, fresh or silk flowers with ferro in patient room  Outcome: Progressing     Problem: SAFETY ADULT  Goal: Patient will remain free of falls  Description: INTERVENTIONS:  - Educate patient/family on patient safety including physical limitations  - Instruct patient to call for assistance with activity   -  Consider consulting OT/PT to assist with strengthening/mobility based on AM PAC & JH-HLM score  - Consult OT/PT to assist with strengthening/mobility   - Keep Call bell within reach  - Keep bed low and locked with side rails adjusted as appropriate  - Keep care items and personal belongings within reach  - Initiate and maintain comfort rounds  - Make Fall Risk Sign visible to staff  - Offer Toileting every 2 Hours, in advance of need  - Initiate/Maintain bed alarm  - Obtain necessary fall risk management equipment: fall risk sign on door  - Apply yellow socks and bracelet for high fall risk patients  - Consider moving patient to room near nurses station  Outcome: Progressing  Goal: Maintain or return to baseline ADL function  Description: INTERVENTIONS:  -  Assess patient's ability to carry out ADLs; assess patient's baseline for ADL function and identify physical deficits which impact ability to perform ADLs (bathing, care of mouth/teeth, toileting, grooming, dressing, etc.)  - Assess/evaluate cause of self-care deficits   - Assess range of motion  - Assess patient's mobility; develop plan if impaired  - Assess patient's need for assistive devices and provide as appropriate  - Encourage maximum independence but intervene and supervise when necessary  - Involve family in performance of ADLs  - Assess for home care needs following discharge   - Consider OT consult to assist with ADL evaluation and planning for discharge  - Provide patient education as appropriate  - Monitor functional capacity and physical performance, use of AM PAC & JH-HLM   - Monitor gait, balance and fatigue with ambulation    Outcome: Progressing  Goal: Maintains/Returns to pre admission functional level  Description: INTERVENTIONS:  - Perform AM-PAC 6 Click Basic Mobility/ Daily Activity assessment daily.  - Set and communicate daily mobility goal to care team and patient/family/caregiver.   - Collaborate with rehabilitation services on  mobility goals if consulted  - Perform Range of Motion 2 times a day.  - Reposition patient every 2 hours.  - Dangle patient 2 times a day  - Stand patient 2 times a day  - Ambulate patient 2 times a day  - Out of bed to chair 3 times a day   - Out of bed for meals 3 times a day  - Out of bed for toileting  - Record patient progress and toleration of activity level   Outcome: Progressing     Problem: DISCHARGE PLANNING  Goal: Discharge to home or other facility with appropriate resources  Description: INTERVENTIONS:  - Identify barriers to discharge w/patient and caregiver  - Arrange for needed discharge resources and transportation as appropriate  - Identify discharge learning needs (meds, wound care, etc.)  - Arrange for interpretive services to assist at discharge as needed  - Refer to Case Management Department for coordinating discharge planning if the patient needs post-hospital services based on physician/advanced practitioner order or complex needs related to functional status, cognitive ability, or social support system  Outcome: Progressing     Problem: Knowledge Deficit  Goal: Patient/family/caregiver demonstrates understanding of disease process, treatment plan, medications, and discharge instructions  Description: Complete learning assessment and assess knowledge base.  Interventions:  - Provide teaching at level of understanding  - Provide teaching via preferred learning methods  Outcome: Progressing     Problem: Nutrition/Hydration-ADULT  Goal: Nutrient/Hydration intake appropriate for improving, restoring or maintaining nutritional needs  Description: Monitor and assess patient's nutrition/hydration status for malnutrition. Collaborate with interdisciplinary team and initiate plan and interventions as ordered.  Monitor patient's weight and dietary intake as ordered or per policy. Utilize nutrition screening tool and intervene as necessary. Determine patient's food preferences and provide  high-protein, high-caloric foods as appropriate.     INTERVENTIONS:  - Monitor oral intake, urinary output, labs, and treatment plans  - Assess nutrition and hydration status and recommend course of action  - Evaluate amount of meals eaten  - Assist patient with eating if necessary   - Allow adequate time for meals  - Recommend/ encourage appropriate diets, oral nutritional supplements, and vitamin/mineral supplements  - Order, calculate, and assess calorie counts as needed  - Recommend, monitor, and adjust tube feedings and TPN/PPN based on assessed needs  - Assess need for intravenous fluids  - Provide specific nutrition/hydration education as appropriate  - Include patient/family/caregiver in decisions related to nutrition  Outcome: Progressing     Problem: NEUROSENSORY - ADULT  Goal: Achieves stable or improved neurological status  Description: INTERVENTIONS  - Monitor and report changes in neurological status  - Monitor vital signs such as temperature, blood pressure, glucose, and any other labs ordered   - Initiate measures to prevent increased intracranial pressure  - Monitor for seizure activity and implement precautions if appropriate      Outcome: Progressing  Goal: Achieves maximal functionality and self care  Description: INTERVENTIONS  - Monitor swallowing and airway patency with patient fatigue and changes in neurological status  - Encourage and assist patient to increase activity and self care.   - Encourage visually impaired, hearing impaired and aphasic patients to use assistive/communication devices  Outcome: Progressing     Problem: CARDIOVASCULAR - ADULT  Goal: Maintains optimal cardiac output and hemodynamic stability  Description: INTERVENTIONS:  - Monitor I/O, vital signs and rhythm  - Monitor for S/S and trends of decreased cardiac output  - Administer and titrate ordered vasoactive medications to optimize hemodynamic stability  - Assess quality of pulses, skin color and temperature  - Assess  for signs of decreased coronary artery perfusion  - Instruct patient to report change in severity of symptoms  Outcome: Progressing  Goal: Absence of cardiac dysrhythmias or at baseline rhythm  Description: INTERVENTIONS:  - Continuous cardiac monitoring, vital signs, obtain 12 lead EKG if ordered  - Administer antiarrhythmic and heart rate control medications as ordered  - Monitor electrolytes and administer replacement therapy as ordered  Outcome: Progressing     Problem: RESPIRATORY - ADULT  Goal: Achieves optimal ventilation and oxygenation  Description: INTERVENTIONS:  - Assess for changes in respiratory status  - Assess for changes in mentation and behavior  - Position to facilitate oxygenation and minimize respiratory effort  - Oxygen administered by appropriate delivery if ordered  - Initiate smoking cessation education as indicated  - Encourage broncho-pulmonary hygiene including cough, deep breathe, Incentive Spirometry  - Assess the need for suctioning and aspirate as needed  - Assess and instruct to report SOB or any respiratory difficulty  - Respiratory Therapy support as indicated  Outcome: Progressing     Problem: METABOLIC, FLUID AND ELECTROLYTES - ADULT  Goal: Electrolytes maintained within normal limits  Description: INTERVENTIONS:  - Monitor labs and assess patient for signs and symptoms of electrolyte imbalances  - Administer electrolyte replacement as ordered  - Monitor response to electrolyte replacements, including repeat lab results as appropriate  - Instruct patient on fluid and nutrition as appropriate  Outcome: Progressing     Problem: HEMATOLOGIC - ADULT  Goal: Maintains hematologic stability  Description: INTERVENTIONS  - Assess for signs and symptoms of bleeding or hemorrhage  - Monitor labs  - Administer supportive blood products/factors as ordered and appropriate  Outcome: Progressing     Problem: Neurological Deficit  Goal: Neurological status is stable or improving  Description:  Interventions:  - Monitor and assess patient's level of consciousness, motor function, sensory function, and level of assistance needed for ADLs.   - Monitor and report changes from baseline. Collaborate with interdisciplinary team to initiate plan and implement interventions as ordered.   - Provide and maintain a safe environment.  - Consider seizure precautions.  - Consider fall precautions.  - Consider aspiration precautions.  - Consider bleeding precautions.  Outcome: Progressing     Problem: Activity Intolerance/Impaired Mobility  Goal: Mobility/activity is maintained at optimum level for patient  Description: Interventions:  - Assess and monitor patient  barriers to mobility and need for assistive/adaptive devices.  - Assess patient's emotional response to limitations.  - Collaborate with interdisciplinary team and initiate plans and interventions as ordered.  - Encourage independent activity per ability.  - Maintain proper body alignment.  - Perform active/passive rom as tolerated/ordered.  - Plan activities to conserve energy.  - Turn patient as appropriate  Outcome: Progressing     Problem: Communication Impairment  Goal: Ability to express needs and understand communication  Description: Assess patient's communication skills and ability to understand information.  Patient will demonstrate use of effective communication techniques, alternative methods of communication and understanding even if not able to speak.     - Encourage communication and provide alternate methods of communication as needed.  - Collaborate with case management/ for discharge needs.  - Include patient/family/caregiver in decisions related to communication.  Outcome: Progressing     Problem: Potential for Aspiration  Goal: Non-ventilated patient's risk of aspiration is minimized  Description: Assess and monitor vital signs, respiratory status, and labs (WBC).  Monitor for signs of aspiration (tachypnea, cough, rales,  wheezing, cyanosis, fever).    - Assess and monitor patient's ability to swallow.  - Place patient up in chair to eat if possible.  - HOB up at 90 degrees to eat if unable to get patient up into chair.  - Supervise patient during oral intake.   - Instruct patient/ family to take small bites.  - Instruct patient/ family to take small single sips when taking liquids.  - Follow patient-specific strategies generated by speech pathologist.  Outcome: Progressing     Problem: Nutrition  Goal: Nutrition/Hydration status is improving  Description: Monitor and assess patient's nutrition/hydration status for malnutrition (ex- brittle hair, bruises, dry skin, pale skin and conjunctiva, muscle wasting, smooth red tongue, and disorientation). Collaborate with interdisciplinary team and initiate plan and interventions as ordered.  Monitor patient's weight and dietary intake as ordered or per policy. Utilize nutrition screening tool and intervene per policy. Determine patient's food preferences and provide high-protein, high-caloric foods as appropriate.     - Assist patient with eating.  - Allow adequate time for meals.  - Encourage patient to take dietary supplement as ordered.  - Collaborate with clinical nutritionist.  - Include patient/family/caregiver in decisions related to nutrition.  Outcome: Progressing

## 2025-06-04 NOTE — CONSULTS
Consultation - Neurology   Name: Kevin Schmitz 79 y.o. male I MRN: [id number]  Unit/Bed#: 4 [place] 422-01 I Date of Admission: 6/3/2025   Date of Service: 6/4/2025 I Hospital Day: 0   Inpatient consult to Neurology  Consult performed by: Sharon Rivas PA-C  Consult ordered by: Mary Jane Yepez PA-C        Physician Requesting Evaluation: Blake Nunez MD   Reason for Evaluation / Principal Problem: Facial paresthesia    Assessment & Plan  Facial paresthesia  79 y.o. with HTN, HLD, PVCs, BPH, sensorineural hearing loss, and history of primary pancreatic neuroendocrine tumor s/p Whipple procedure in 2016, history of DVT in 2023 who presents with complaint of L facial tingling/numbness and L sided neck discomfort.    Neuroimaging   6/3/2025 CTA head and neck with and without contrast:   No mass effect, acute intracranial hemorrhage, or evidence of recent infarction.   No evidence for high-grade stenosis, focal occlusion, or vascular aneurysm of the cervical or intracranial vessels.    Plan:  - Continue on acute ischemic stroke pathway.  - MRI brain pending  - Echocardiogram pending  - Monitor on telemetry.   - Lipid panel reviewed, total cholesterol 141, triglycerides 88, HDL 46, LDL 77.  - Hemoglobin A1c pending.  - Continue on ASA 81 mg QD.   - Continue on atorvastatin 40 mg QPM.  - Allow for permissive hypertension with BP goal <220/110. Treat with PRN antihypertensives.   - Goal normothermia and euglycemia.   - PT/OT  - Stroke education.   - Monitor exam and notify with changes.  Essential hypertension  - Allow for permissive hypertension with BP goal <220/110.   - Treat with PRN antihypertensives.  Hypercholesterolemia  - Lipid panel reviewed as above.   - Continue on atorvastatin.  Saphenous vein thrombophlebitis  - Hematology consult pending.    Recommendations for outpatient neurological follow up have yet to be determined.    History of Present Illness   Kevin Schmitz is a 79 y.o.  with HTN,  What Is The Reason For Today's Visit?: Full Body Skin Examination "HLD, PVCs, BPH, sensorineural hearing loss, and history of primary pancreatic neuroendocrine tumor s/p Whipple procedure in 2016, history of DVT in 2023 who presents with complaint of L facial tingling/numbness and L sided neck discomfort.     Patient reports that his neck felt \"stiff\" when he woke up yesterday. He denies radiation of the discomfort into his face or arms. He notes that he was then working in the yard and noticed that the left side of his face was tingling/numb when compared to the right side. He notes he has never had similar symptoms before in the past and his wife encouraged him to come to the hospital for evaluation. He notes that symptoms are improved compared to yesterday and that the neck pain is much improved and only 1/10 in severity currently. He notes that the left side of his face is still tingling/numb.      He denies history of stroke/TIA. He denies tobacco and drug use. He notes ETOH use about 6-8 drinks per week. He denies recent fever, chills or infections. He notes that he takes ASA 81 mg QD and is compliant with this medication. He notes he did take Eliquis previously for DVT but has been off of this medication for several years.     Review of Systems   Constitutional:  Negative for chills, fatigue and fever.   HENT:  Negative for trouble swallowing.    Eyes:  Negative for visual disturbance.   Respiratory:  Negative for shortness of breath.    Cardiovascular:  Negative for chest pain.   Gastrointestinal:  Negative for abdominal pain, nausea and vomiting.   Genitourinary:  Negative for difficulty urinating and dysuria.   Musculoskeletal:  Positive for neck pain. Negative for back pain and gait problem.   Skin:  Negative for rash.   Neurological:  Positive for numbness. Negative for dizziness, speech difficulty, weakness, light-headedness and headaches.   Psychiatric/Behavioral:  Negative for confusion.      Medications  Scheduled Meds:  Current Facility-Administered Medications " What Is The Reason For Today's Visit? (Being Monitored For X): surveillance against the recurrence of atypical nevi   Medication Dose Route Frequency Provider Last Rate    [Held by provider] amLODIPine  10 mg Oral Daily Mary Jane Yepez PA-C      aspirin  81 mg Oral Daily Mary Jane Yepez PA-C      atorvastatin  40 mg Oral QPM Mary Jane Yepez PA-C      [Held by provider] carvedilol  12.5 mg Oral BID Mary Jane Yepez PA-C      enoxaparin  40 mg Subcutaneous Daily Mary Jane Yepez PA-C      LORazepam  0.5 mg Intravenous Once PRN Mary Jane Yepez PA-C      [Held by provider] spironolactone-hydrochlorothiazide  1.5 tablet Oral Daily Mary Jane Yepez PA-C       Continuous Infusions:   PRN Meds:.  LORazepam    Historical Information   Past Medical History[1]  Past Surgical History[2]  Social History[3]  E-Cigarette/Vaping    E-Cigarette Use Never User      E-Cigarette/Vaping Substances    Nicotine No     THC No     CBD No     Flavoring No     Other No     Unknown No      Family History[4]    Objective :  Temp:  [97.6 °F (36.4 °C)-98.2 °F (36.8 °C)] 97.9 °F (36.6 °C)  HR:  [58-73] 73  BP: (109-150)/(60-99) 137/73  Resp:  [14-19] 16  SpO2:  [92 %-99 %] 93 %  O2 Device: None (Room air)    Physical Exam  Constitutional:       General: He is not in acute distress.     Appearance: Normal appearance. He is not ill-appearing, toxic-appearing or diaphoretic.   HENT:      Head: Normocephalic and atraumatic.      Right Ear: Hearing normal.      Left Ear: Hearing normal.      Mouth/Throat:      Mouth: Mucous membranes are moist.      Pharynx: Oropharynx is clear. No oropharyngeal exudate.     Eyes:      General: No scleral icterus.        Right eye: No discharge.         Left eye: No discharge.      Extraocular Movements: EOM normal. No nystagmus.      Conjunctiva/sclera: Conjunctivae normal.       Cardiovascular:      Rate and Rhythm: Normal rate and regular rhythm.   Pulmonary:      Effort: Pulmonary effort is normal. No respiratory distress.      Breath sounds: Normal breath sounds.   Abdominal:      General: There is no distension.      Palpations: Abdomen is  soft.      Tenderness: There is no abdominal tenderness.     Musculoskeletal:         General: Normal range of motion.      Cervical back: Normal range of motion and neck supple.      Right lower leg: No edema.      Left lower leg: No edema.     Skin:     General: Skin is warm and dry.      Findings: No erythema or rash.     Neurological:      Mental Status: He is alert and oriented to person, place, and time.      Deep Tendon Reflexes:      Reflex Scores:       Bicep reflexes are 2+ on the right side and 2+ on the left side.       Brachioradialis reflexes are 2+ on the right side and 2+ on the left side.       Patellar reflexes are 2+ on the right side and 2+ on the left side.       Achilles reflexes are 1+ on the right side and 1+ on the left side.    Psychiatric:         Mood and Affect: Mood normal.         Speech: Speech normal.         Behavior: Behavior normal.     Neurological Exam  Mental Status  Alert. Oriented to person, place, time and situation. Oriented to person, place, and time. Speech is normal. Language is fluent with no aphasia. Attention and concentration are normal.    Cranial Nerves  CN II: Right normal visual field. Left normal visual field.  CN III, IV, VI: Extraocular movements intact bilaterally. No nystagmus. Normal saccades.   Right pupil: 3 mm. Round. Reactive to light.   Left pupil: 3 mm. Round. Reactive to light.  CN V:  Right: Facial sensation is normal.  Left: Facial sensation is normal on the left. Tingling noted V2-3.  CN VII:  Right: There is no facial weakness.  Left: There is no facial weakness.  CN VIII:  Right: Hearing is normal.  Left: Hearing is normal.  CN IX, X: Palate elevates symmetrically  CN XI: Shoulder shrug strength is normal.  CN XII: Tongue midline without atrophy or fasciculations.    Motor  Normal muscle bulk throughout. No fasciculations present. Normal muscle tone. No abnormal involuntary movements. No pronator drift.  Motor strength 5/5 B/L UE and  LE..    Sensory  Light touch is normal in upper and lower extremities.     Reflexes                                            Right                      Left  Brachioradialis                    2+                         2+  Biceps                                 2+                         2+  Patellar                                2+                         2+  Achilles                                1+                         1+  Right Plantar: downgoing  Left Plantar: downgoing    Right pathological reflexes: Tunde's absent. Ankle clonus absent.  Left pathological reflexes: Tunde's absent. Ankle clonus absent.    Coordination  Right: Finger-to-nose normal. Heel-to-shin normal.Left: Finger-to-nose normal. Heel-to-shin normal.    Gait    Deferred for safety..        Lab Results: I have reviewed the following results:  Recent Results (from the past 24 hours)   ECG 12 lead    Collection Time: 06/03/25 11:56 AM   Result Value Ref Range    Ventricular Rate 63 BPM    Atrial Rate 63 BPM    TX Interval 180 ms    QRSD Interval 100 ms    QT Interval 414 ms    QTC Interval 423 ms    P Axis 38 degrees    QRS Axis -21 degrees    T Wave Axis 42 degrees   CBC and differential    Collection Time: 06/03/25 12:04 PM   Result Value Ref Range    WBC 4.88 4.31 - 10.16 Thousand/uL    RBC 4.60 3.88 - 5.62 Million/uL    Hemoglobin 14.2 12.0 - 17.0 g/dL    Hematocrit 42.0 36.5 - 49.3 %    MCV 91 82 - 98 fL    MCH 30.9 26.8 - 34.3 pg    MCHC 33.8 31.4 - 37.4 g/dL    RDW 13.4 11.6 - 15.1 %    MPV 9.7 8.9 - 12.7 fL    Platelets 256 149 - 390 Thousands/uL    nRBC 0 /100 WBCs    Segmented % 51 43 - 75 %    Immature Grans % 0 0 - 2 %    Lymphocytes % 26 14 - 44 %    Monocytes % 17 (H) 4 - 12 %    Eosinophils Relative 5 0 - 6 %    Basophils Relative 1 0 - 1 %    Absolute Neutrophils 2.51 1.85 - 7.62 Thousands/µL    Absolute Immature Grans 0.01 0.00 - 0.20 Thousand/uL    Absolute Lymphocytes 1.25 0.60 - 4.47 Thousands/µL    Absolute  "Monocytes 0.81 0.17 - 1.22 Thousand/µL    Eosinophils Absolute 0.24 0.00 - 0.61 Thousand/µL    Basophils Absolute 0.06 0.00 - 0.10 Thousands/µL   Basic metabolic panel    Collection Time: 06/03/25 12:04 PM   Result Value Ref Range    Sodium 138 135 - 147 mmol/L    Potassium 4.0 3.5 - 5.3 mmol/L    Chloride 105 96 - 108 mmol/L    CO2 23 21 - 32 mmol/L    ANION GAP 10 4 - 13 mmol/L    BUN 16 5 - 25 mg/dL    Creatinine 0.77 0.60 - 1.30 mg/dL    Glucose 123 65 - 140 mg/dL    Calcium 8.8 8.4 - 10.2 mg/dL    eGFR 87 ml/min/1.73sq m   HS Troponin 0hr (reflex protocol)    Collection Time: 06/03/25 12:04 PM   Result Value Ref Range    hs TnI 0hr 3 \"Refer to ACS Flowchart\"- see link ng/L   Vitamin B12    Collection Time: 06/03/25 12:04 PM   Result Value Ref Range    Vitamin B-12 239 180 - 914 pg/mL   HS Troponin I 2hr    Collection Time: 06/03/25  3:13 PM   Result Value Ref Range    hs TnI 2hr 3 \"Refer to ACS Flowchart\"- see link ng/L    Delta 2hr hsTnI 0 <20 ng/L   Lipid Panel with Direct LDL reflex    Collection Time: 06/04/25  4:07 AM   Result Value Ref Range    Cholesterol 141 See Comment mg/dL    Triglycerides 88 See Comment mg/dL    HDL, Direct 46 >=40 mg/dL    LDL Calculated 77 0 - 100 mg/dL   Basic metabolic panel    Collection Time: 06/04/25  4:07 AM   Result Value Ref Range    Sodium 136 135 - 147 mmol/L    Potassium 3.5 3.5 - 5.3 mmol/L    Chloride 105 96 - 108 mmol/L    CO2 20 (L) 21 - 32 mmol/L    ANION GAP 11 4 - 13 mmol/L    BUN 14 5 - 25 mg/dL    Creatinine 0.70 0.60 - 1.30 mg/dL    Glucose 95 65 - 140 mg/dL    Glucose, Fasting 95 65 - 99 mg/dL    Calcium 8.5 8.4 - 10.2 mg/dL    eGFR 91 ml/min/1.73sq m   CBC (With Platelets)    Collection Time: 06/04/25  4:07 AM   Result Value Ref Range    WBC 5.42 4.31 - 10.16 Thousand/uL    RBC 4.42 3.88 - 5.62 Million/uL    Hemoglobin 14.0 12.0 - 17.0 g/dL    Hematocrit 40.7 36.5 - 49.3 %    MCV 92 82 - 98 fL    MCH 31.7 26.8 - 34.3 pg    MCHC 34.4 31.4 - 37.4 g/dL    RDW " 13.6 11.6 - 15.1 %    Platelets 259 149 - 390 Thousands/uL    MPV 9.7 8.9 - 12.7 fL     Imaging  Imaging Results Review: I personally reviewed the following image studies in PACS and associated radiology reports: CTA head and neck with and without contrast- No mass effect, acute intracranial hemorrhage, or evidence of recent infarction. No evidence for high-grade stenosis, focal occlusion or vascular aneurysm of the cervical or intracranial vessels.    VTE Prophylaxis: Enoxaparin (Lovenox)           [1]   Past Medical History:  Diagnosis Date    BEP (benign enlargement of prostate)     Cancer (HCC)     pancreatic   whipple procedure  basal cell    Cardiac murmur     Colon polyp     DVT (deep venous thrombosis) (HCC)     left leg    Erectile dysfunction     HL (hearing loss) 2020    Hyperlipidemia     Hypertension     Positive colorectal cancer screening using Cologuard test 01/2021    PVC (premature ventricular contraction) 1990's    hx of, most recent stress test 2018 - normal results per pt    Right bundle branch block     Tinnitus 2015   [2]   Past Surgical History:  Procedure Laterality Date    APPENDECTOMY      COLONOSCOPY      HERNIA REPAIR      right inguinal x2 and left inguinal x1    PANCREAS SURGERY      [place] TRURL ELECTROSURG RESCJ PROSTATE BLEED COMPLETE N/A 3/19/2025    Procedure: TRANSURETHRAL RESECTION OF PROSTATE (TURP);  Surgeon: Arturo Aguila MD;  Location: [facility];  Service: Urology    SPLENECTOMY, TOTAL      WHIPPLE PROCEDURE/PANCREATICO-DUODENECTOMY     [3]   Social History  Tobacco Use    Smoking status: Never    Smokeless tobacco: Never   Vaping Use    Vaping status: Never Used   Substance and Sexual Activity    Alcohol use: Yes     Alcohol/week: 10.0 standard drinks of alcohol     Types: 10 Cans of beer per week     Comment: occasional    Drug use: No    Sexual activity: Not Currently   [4]   Family History  Problem Relation Name Age of Onset    Hypertension Mother Stephanie sierra      Hypertension Father Nilesh Schmitz     Cancer Father Nilesh Schmitz     Cancer Daughter Angelia

## 2025-06-04 NOTE — DISCHARGE INSTR - AVS FIRST PAGE
Dear Kevin Schmitz,     It was our pleasure to care for you here at [facility].  It is our hope that we were always able to meet and exceed the expected standards for your care during your stay.  You were hospitalized due to left-sided facial numbness and tingling .  You were cared for on the 4th floor under the service of Karen Buchanan PA-C with the [facility] who covers for your primary care physician (PCP), Kashmir Oleary DO, while you were hospitalized.  If you have any questions or concerns related to this hospitalization, you may contact us at .  For follow up, we recommend that you follow up with your primary care physician.  Please review this entire discharge summary as additional general instructions may be provided later as well.  However, at this time we provide for you here, the most important instructions / recommendations at discharge:     Schedule an appointment with your PCP in 1-2 weeks to review your hospital course   Obtain an MRI cervical spine without contrast (orders have been placed)  ZIO patch for 30 days to monitor your heart rhythm   Schedule an appointment with Neurology (Dr. Lance Boogie) in 8 to 10 weeks      Sincerely,     Karen Buchanan PA-C

## 2025-06-04 NOTE — ASSESSMENT & PLAN NOTE
Home regimen includes amlodipine 10 mg daily, spironolactone-HCTZ 25-25 mg daily, and Coreg 6.25 mg daily  Hold to allow for permissive hypertension while admitted  Can resume regimen on discharge

## 2025-06-04 NOTE — CONSULTS
Consultation - Oncology-Medical   Name: Kevin Schmitz 79 y.o. male I MRN: [id number]  Unit/Bed#: 4 [place] 422-01 I Date of Admission: 6/3/2025   Date of Service: 6/4/2025 I Hospital Day: 0   Inpatient consult to Hematology  Consult performed by: Jessica Brown PA-C  Consult ordered by: Mary Jane Yepez PA-C        Physician Requesting Evaluation: Blake Nunez MD   Reason for Evaluation / Principal Problem: 1. History of DVT of lower extremity 2. Saphenous vein thrombophlebitis     Assessment & Plan  Malignant neoplasm of pancreas, unspecified location of malignancy (HCC)  Status post Whipple resection.  Benign pathology.  Follows at [facility].  Saphenous vein thrombophlebitis  Patient is a 79-year-old with a history of provoked DVT diagnosed in August 2023.  He was treated with 3 months of therapeutic dose Eliquis with resolution of DVT.    Hypercoagulable workup for anticardiolipin antibodies, beta-2 glycoprotein antibodies, Antithrombin III was unremarkable. Factor V Leiden and lupus anticoagulant was not detected. Protein C/S within normal limits. Prothrombin gene mutation not drawn due to insurance.     Patient has a new finding this hospitalization of left lower extremity subacute versus chronic occlusive superficial thrombophlebitis in the small saphenous vein at the mid calf.  There is no evidence of acute or chronic DVT.    Patient has no edema in the left lower extremity.  He says that he has intermittent discomfort in the medial left calf.  Nothing persistent.    We would not recommend treating with anticoagulation.  Recommend symptom management to include warm compresses, compression, elevation, and anti-inflammatory medication such as ibuprofen (if patient has no contraindication) can be utilized.    If patient requires anticoagulation or antiplatelet therapy for his CNS issues we would defer that to primary team/neurology.    History of Present Illness   Kevin Schmitz is a 79 y.o.  male who previously followed with Dr. Duque.  He has history of hypercholesterolemia, HTN and history of pancreatic mass s/p Whipple procedure (benign).  He developed a left perineal DVT in August 2023 2 weeks after dropping a wooden board on the leg and developing cellulitis.  He was treated with around 3 months of anticoagulation with Eliquis 5 mg twice daily.  Repeat lower extremity Doppler in January 2024 showed resolution of thrombus.    Hypercoagulable workup for anticardiolipin antibodies, beta-2 glycoprotein antibodies, Antithrombin III was unremarkable. Factor V Leiden and lupus anticoagulant was not detected. Protein C/S within normal limits. Prothrombin gene mutation not drawn due to insurance.     Patient presented for evaluation of left sided facial paresthesias.  CT scan of the brain showed no mass effect, acute intracranial hemorrhage or evidence of recent infarction.  CTA head/neck showed no evidence for high-grade stenosis, focal occlusion or vascular aneurysm of the cervical intracranial vessels.  MRI of the brain showed no acute infarct, significant mass effect or midline shift.    He had venous duplex of the left lower extremity due to left lower extremity pain and swelling.  Doppler showed evidence of subacute versus chronic occlusive superficial thrombophlebitis in the small saphenous vein at the mid calf.  There is no evidence of acute or chronic DVT.    Patient says that he has intermittent discomfort in the left medial calf.  He says that he would not classify it as pain.  He denies nausea, vomiting, bowel changes, chest pain, shortness of breath.    He says that prior to presentation he had numbness and tingling in his left face and left foot back.  Pain in the left neck has significantly improved.    Review of Systems   Constitutional:  Negative for activity change, appetite change, fatigue and fever.   HENT:  Negative for nosebleeds.    Respiratory:  Negative for cough, choking and  shortness of breath.    Cardiovascular:  Negative for chest pain, palpitations and leg swelling.   Gastrointestinal:  Negative for abdominal distention, abdominal pain, anal bleeding, blood in stool, constipation, diarrhea, nausea and vomiting.   Endocrine: Negative for cold intolerance.   Genitourinary:  Negative for hematuria.   Musculoskeletal:  Negative for myalgias.   Skin:  Negative for color change, pallor and rash.   Allergic/Immunologic: Negative for immunocompromised state.   Neurological:  Positive for numbness. Negative for headaches.   Hematological:  Negative for adenopathy. Does not bruise/bleed easily.   All other systems reviewed and are negative.    Historical Information     Oncology History:   Cancer Staging   No matching staging information was found for the patient.    Oncology History    No history exists.     Current Medications[1]    Objective :  Temp:  [97.6 °F (36.4 °C)-98.2 °F (36.8 °C)] 97.9 °F (36.6 °C)  HR:  [58-73] 73  BP: (109-150)/(60-99) 137/73  Resp:  [14-19] 16  SpO2:  [92 %-99 %] 93 %  O2 Device: None (Room air)    Physical Exam  Constitutional:       General: He is not in acute distress.     Appearance: Normal appearance. He is well-developed.   HENT:      Head: Normocephalic and atraumatic.      Right Ear: External ear normal.      Left Ear: External ear normal.      Nose: Nose normal.     Eyes:      General: No scleral icterus.     Conjunctiva/sclera: Conjunctivae normal.       Cardiovascular:      Rate and Rhythm: Normal rate and regular rhythm.   Pulmonary:      Effort: Pulmonary effort is normal. No respiratory distress.      Breath sounds: Normal breath sounds.   Abdominal:      General: Abdomen is flat. There is no distension.      Palpations: Abdomen is soft.      Tenderness: There is no abdominal tenderness.     Musculoskeletal:      Right lower leg: No edema.      Left lower leg: No edema.     Skin:     Findings: No rash (on exposed skin.).     Neurological:       Mental Status: He is alert and oriented to person, place, and time.     Psychiatric:         Mood and Affect: Mood normal.         Thought Content: Thought content normal.         Judgment: Judgment normal.         Lab Results: I have reviewed the following results:  Lab Results   Component Value Date    K 3.5 06/04/2025     06/04/2025    CO2 20 (L) 06/04/2025    BUN 14 06/04/2025    CREATININE 0.70 06/04/2025    GLUF 95 06/04/2025    CALCIUM 8.5 06/04/2025    AST 22 10/24/2024    ALT 23 10/24/2024    ALKPHOS 65 10/24/2024    EGFR 91 06/04/2025     Lab Results   Component Value Date    WBC 5.42 06/04/2025    HGB 14.0 06/04/2025    HCT 40.7 06/04/2025    MCV 92 06/04/2025     06/04/2025     Lab Results   Component Value Date    NEUTROABS 2.51 06/03/2025       Imaging Results Review: I reviewed radiology reports from this admission including: CT head and MRI brain.  Other Study Results Review: No additional pertinent studies reviewed.    Administrative Statements   I have spent a total time of 60 minutes in caring for this patient on the day of the visit/encounter including Diagnostic results, Prognosis, Risks and benefits of tx options, Impressions, and Counseling / Coordination of care.         [1]   Current Facility-Administered Medications   Medication Dose Route Frequency Provider Last Rate Last Admin    [Held by provider] amLODIPine (NORVASC) tablet 10 mg  10 mg Oral Daily Mary Jane Yepez PA-C        aspirin chewable tablet 81 mg  81 mg Oral Daily Mary Jane Yepez PA-C   81 mg at 06/04/25 0822    atorvastatin (LIPITOR) tablet 40 mg  40 mg Oral QPM BELA Fisher-C   40 mg at 06/03/25 1730    [Held by provider] carvedilol (COREG) tablet 12.5 mg  12.5 mg Oral BID Mary Jane Yepez PA-C        enoxaparin (LOVENOX) subcutaneous injection 40 mg  40 mg Subcutaneous Daily WILL FisherC   40 mg at 06/04/25 0822    LORazepam (ATIVAN) injection 0.5 mg  0.5 mg Intravenous Once PRN Mary Jane Yepez PA-C        [Held  by provider] spironolactone-hydrochlorothiazide (ALDACTAZIDE) 25-25 mg per tablet 1.5 tablet  1.5 tablet Oral Daily Mary Jane Yepez PA-C

## 2025-06-04 NOTE — PLAN OF CARE
Problem: PAIN - ADULT  Goal: Verbalizes/displays adequate comfort level or baseline comfort level  Description: Interventions:  - Encourage patient to monitor pain and request assistance  - Assess pain using appropriate pain scale  - Administer analgesics as ordered based on type and severity of pain and evaluate response  - Implement non-pharmacological measures as appropriate and evaluate response  - Consider cultural and social influences on pain and pain management  - Notify physician/advanced practitioner if interventions unsuccessful or patient reports new pain  - Educate patient/family on pain management process including their role and importance of  reporting pain   - Provide non-pharmacologic/complimentary pain relief interventions  Outcome: Progressing     Problem: INFECTION - ADULT  Goal: Absence or prevention of progression during hospitalization  Description: INTERVENTIONS:  - Assess and monitor for signs and symptoms of infection  - Monitor lab/diagnostic results  - Monitor all insertion sites, i.e. indwelling lines, tubes, and drains  - Monitor endotracheal if appropriate and nasal secretions for changes in amount and color  - [place] appropriate cooling/warming therapies per order  - Administer medications as ordered  - Instruct and encourage patient and family to use good hand hygiene technique  - Identify and instruct in appropriate isolation precautions for identified infection/condition  Outcome: Progressing  Goal: Absence of fever/infection during neutropenic period  Description: INTERVENTIONS:  - Monitor WBC  - Perform strict hand hygiene  - Limit to healthy visitors only  - No plants, dried, fresh or silk flowers with ferro in patient room  Outcome: Progressing     Problem: SAFETY ADULT  Goal: Patient will remain free of falls  Description: INTERVENTIONS:  - Educate patient/family on patient safety including physical limitations  - Instruct patient to call for assistance with activity   -  Consider consulting OT/PT to assist with strengthening/mobility based on AM PAC & JH-HLM score  - Consult OT/PT to assist with strengthening/mobility   - Keep Call bell within reach  - Keep bed low and locked with side rails adjusted as appropriate  - Keep care items and personal belongings within reach  - Initiate and maintain comfort rounds  - Make Fall Risk Sign visible to staff  - Offer Toileting every 2 Hours, in advance of need  - Initiate/Maintain bed alarm  - Obtain necessary fall risk management equipment: fall risk sign on door  - Apply yellow socks and bracelet for high fall risk patients  - Consider moving patient to room near nurses station  Outcome: Progressing  Goal: Maintain or return to baseline ADL function  Description: INTERVENTIONS:  -  Assess patient's ability to carry out ADLs; assess patient's baseline for ADL function and identify physical deficits which impact ability to perform ADLs (bathing, care of mouth/teeth, toileting, grooming, dressing, etc.)  - Assess/evaluate cause of self-care deficits   - Assess range of motion  - Assess patient's mobility; develop plan if impaired  - Assess patient's need for assistive devices and provide as appropriate  - Encourage maximum independence but intervene and supervise when necessary  - Involve family in performance of ADLs  - Assess for home care needs following discharge   - Consider OT consult to assist with ADL evaluation and planning for discharge  - Provide patient education as appropriate  - Monitor functional capacity and physical performance, use of AM PAC & JH-HLM   - Monitor gait, balance and fatigue with ambulation    Outcome: Progressing  Goal: Maintains/Returns to pre admission functional level  Description: INTERVENTIONS:  - Perform AM-PAC 6 Click Basic Mobility/ Daily Activity assessment daily.  - Set and communicate daily mobility goal to care team and patient/family/caregiver.   - Collaborate with rehabilitation services on  mobility goals if consulted  - Perform Range of Motion 2 times a day.  - Reposition patient every 2 hours.  - Dangle patient 2 times a day  - Stand patient 2 times a day  - Ambulate patient 2 times a day  - Out of bed to chair 3 times a day   - Out of bed for meals 3 times a day  - Out of bed for toileting  - Record patient progress and toleration of activity level   Outcome: Progressing     Problem: DISCHARGE PLANNING  Goal: Discharge to home or other facility with appropriate resources  Description: INTERVENTIONS:  - Identify barriers to discharge w/patient and caregiver  - Arrange for needed discharge resources and transportation as appropriate  - Identify discharge learning needs (meds, wound care, etc.)  - Arrange for interpretive services to assist at discharge as needed  - Refer to Case Management Department for coordinating discharge planning if the patient needs post-hospital services based on physician/advanced practitioner order or complex needs related to functional status, cognitive ability, or social support system  Outcome: Progressing     Problem: Knowledge Deficit  Goal: Patient/family/caregiver demonstrates understanding of disease process, treatment plan, medications, and discharge instructions  Description: Complete learning assessment and assess knowledge base.  Interventions:  - Provide teaching at level of understanding  - Provide teaching via preferred learning methods  Outcome: Progressing     Problem: Nutrition/Hydration-ADULT  Goal: Nutrient/Hydration intake appropriate for improving, restoring or maintaining nutritional needs  Description: Monitor and assess patient's nutrition/hydration status for malnutrition. Collaborate with interdisciplinary team and initiate plan and interventions as ordered.  Monitor patient's weight and dietary intake as ordered or per policy. Utilize nutrition screening tool and intervene as necessary. Determine patient's food preferences and provide  high-protein, high-caloric foods as appropriate.     INTERVENTIONS:  - Monitor oral intake, urinary output, labs, and treatment plans  - Assess nutrition and hydration status and recommend course of action  - Evaluate amount of meals eaten  - Assist patient with eating if necessary   - Allow adequate time for meals  - Recommend/ encourage appropriate diets, oral nutritional supplements, and vitamin/mineral supplements  - Order, calculate, and assess calorie counts as needed  - Recommend, monitor, and adjust tube feedings and TPN/PPN based on assessed needs  - Assess need for intravenous fluids  - Provide specific nutrition/hydration education as appropriate  - Include patient/family/caregiver in decisions related to nutrition  Outcome: Progressing     Problem: NEUROSENSORY - ADULT  Goal: Achieves stable or improved neurological status  Description: INTERVENTIONS  - Monitor and report changes in neurological status  - Monitor vital signs such as temperature, blood pressure, glucose, and any other labs ordered   - Initiate measures to prevent increased intracranial pressure  - Monitor for seizure activity and implement precautions if appropriate      Outcome: Progressing  Goal: Achieves maximal functionality and self care  Description: INTERVENTIONS  - Monitor swallowing and airway patency with patient fatigue and changes in neurological status  - Encourage and assist patient to increase activity and self care.   - Encourage visually impaired, hearing impaired and aphasic patients to use assistive/communication devices  Outcome: Progressing     Problem: CARDIOVASCULAR - ADULT  Goal: Maintains optimal cardiac output and hemodynamic stability  Description: INTERVENTIONS:  - Monitor I/O, vital signs and rhythm  - Monitor for S/S and trends of decreased cardiac output  - Administer and titrate ordered vasoactive medications to optimize hemodynamic stability  - Assess quality of pulses, skin color and temperature  - Assess  for signs of decreased coronary artery perfusion  - Instruct patient to report change in severity of symptoms  Outcome: Progressing  Goal: Absence of cardiac dysrhythmias or at baseline rhythm  Description: INTERVENTIONS:  - Continuous cardiac monitoring, vital signs, obtain 12 lead EKG if ordered  - Administer antiarrhythmic and heart rate control medications as ordered  - Monitor electrolytes and administer replacement therapy as ordered  Outcome: Progressing     Problem: RESPIRATORY - ADULT  Goal: Achieves optimal ventilation and oxygenation  Description: INTERVENTIONS:  - Assess for changes in respiratory status  - Assess for changes in mentation and behavior  - Position to facilitate oxygenation and minimize respiratory effort  - Oxygen administered by appropriate delivery if ordered  - Initiate smoking cessation education as indicated  - Encourage broncho-pulmonary hygiene including cough, deep breathe, Incentive Spirometry  - Assess the need for suctioning and aspirate as needed  - Assess and instruct to report SOB or any respiratory difficulty  - Respiratory Therapy support as indicated  Outcome: Progressing     Problem: METABOLIC, FLUID AND ELECTROLYTES - ADULT  Goal: Electrolytes maintained within normal limits  Description: INTERVENTIONS:  - Monitor labs and assess patient for signs and symptoms of electrolyte imbalances  - Administer electrolyte replacement as ordered  - Monitor response to electrolyte replacements, including repeat lab results as appropriate  - Instruct patient on fluid and nutrition as appropriate  Outcome: Progressing     Problem: HEMATOLOGIC - ADULT  Goal: Maintains hematologic stability  Description: INTERVENTIONS  - Assess for signs and symptoms of bleeding or hemorrhage  - Monitor labs  - Administer supportive blood products/factors as ordered and appropriate  Outcome: Progressing

## 2025-06-04 NOTE — ASSESSMENT & PLAN NOTE
Patient is a 79-year-old with a history of provoked DVT diagnosed in August 2023.  He was treated with 3 months of therapeutic dose Eliquis with resolution of DVT.

## 2025-06-04 NOTE — PLAN OF CARE
Problem: PAIN - ADULT  Goal: Verbalizes/displays adequate comfort level or baseline comfort level  Description: Interventions:  - Encourage patient to monitor pain and request assistance  - Assess pain using appropriate pain scale  - Administer analgesics as ordered based on type and severity of pain and evaluate response  - Implement non-pharmacological measures as appropriate and evaluate response  - Consider cultural and social influences on pain and pain management  - Notify physician/advanced practitioner if interventions unsuccessful or patient reports new pain  - Educate patient/family on pain management process including their role and importance of  reporting pain   - Provide non-pharmacologic/complimentary pain relief interventions  Outcome: Progressing     Problem: INFECTION - ADULT  Goal: Absence or prevention of progression during hospitalization  Description: INTERVENTIONS:  - Assess and monitor for signs and symptoms of infection  - Monitor lab/diagnostic results  - Monitor all insertion sites, i.e. indwelling lines, tubes, and drains  - Monitor endotracheal if appropriate and nasal secretions for changes in amount and color  - [place] appropriate cooling/warming therapies per order  - Administer medications as ordered  - Instruct and encourage patient and family to use good hand hygiene technique  - Identify and instruct in appropriate isolation precautions for identified infection/condition  Outcome: Progressing  Goal: Absence of fever/infection during neutropenic period  Description: INTERVENTIONS:  - Monitor WBC  - Perform strict hand hygiene  - Limit to healthy visitors only  - No plants, dried, fresh or silk flowers with ferro in patient room  Outcome: Progressing     Problem: SAFETY ADULT  Goal: Patient will remain free of falls  Description: INTERVENTIONS:  - Educate patient/family on patient safety including physical limitations  - Instruct patient to call for assistance with activity   -  Consider consulting OT/PT to assist with strengthening/mobility based on AM PAC & JH-HLM score  - Consult OT/PT to assist with strengthening/mobility   - Keep Call bell within reach  - Keep bed low and locked with side rails adjusted as appropriate  - Keep care items and personal belongings within reach  - Initiate and maintain comfort rounds  - Make Fall Risk Sign visible to staff  - Offer Toileting every 2 Hours, in advance of need  - Initiate/Maintain bed alarm  - Obtain necessary fall risk management equipment: fall risk sign on door  - Apply yellow socks and bracelet for high fall risk patients  - Consider moving patient to room near nurses station  Outcome: Progressing  Goal: Maintain or return to baseline ADL function  Description: INTERVENTIONS:  -  Assess patient's ability to carry out ADLs; assess patient's baseline for ADL function and identify physical deficits which impact ability to perform ADLs (bathing, care of mouth/teeth, toileting, grooming, dressing, etc.)  - Assess/evaluate cause of self-care deficits   - Assess range of motion  - Assess patient's mobility; develop plan if impaired  - Assess patient's need for assistive devices and provide as appropriate  - Encourage maximum independence but intervene and supervise when necessary  - Involve family in performance of ADLs  - Assess for home care needs following discharge   - Consider OT consult to assist with ADL evaluation and planning for discharge  - Provide patient education as appropriate  - Monitor functional capacity and physical performance, use of AM PAC & JH-HLM   - Monitor gait, balance and fatigue with ambulation    Outcome: Progressing  Goal: Maintains/Returns to pre admission functional level  Description: INTERVENTIONS:  - Perform AM-PAC 6 Click Basic Mobility/ Daily Activity assessment daily.  - Set and communicate daily mobility goal to care team and patient/family/caregiver.   - Collaborate with rehabilitation services on  mobility goals if consulted  - Perform Range of Motion 2 times a day.  - Reposition patient every 2 hours.  - Dangle patient 2 times a day  - Stand patient 2 times a day  - Ambulate patient 2 times a day  - Out of bed to chair 3 times a day   - Out of bed for meals 3 times a day  - Out of bed for toileting  - Record patient progress and toleration of activity level   Outcome: Progressing     Problem: DISCHARGE PLANNING  Goal: Discharge to home or other facility with appropriate resources  Description: INTERVENTIONS:  - Identify barriers to discharge w/patient and caregiver  - Arrange for needed discharge resources and transportation as appropriate  - Identify discharge learning needs (meds, wound care, etc.)  - Arrange for interpretive services to assist at discharge as needed  - Refer to Case Management Department for coordinating discharge planning if the patient needs post-hospital services based on physician/advanced practitioner order or complex needs related to functional status, cognitive ability, or social support system  Outcome: Progressing     Problem: Knowledge Deficit  Goal: Patient/family/caregiver demonstrates understanding of disease process, treatment plan, medications, and discharge instructions  Description: Complete learning assessment and assess knowledge base.  Interventions:  - Provide teaching at level of understanding  - Provide teaching via preferred learning methods  Outcome: Progressing     Problem: Nutrition/Hydration-ADULT  Goal: Nutrient/Hydration intake appropriate for improving, restoring or maintaining nutritional needs  Description: Monitor and assess patient's nutrition/hydration status for malnutrition. Collaborate with interdisciplinary team and initiate plan and interventions as ordered.  Monitor patient's weight and dietary intake as ordered or per policy. Utilize nutrition screening tool and intervene as necessary. Determine patient's food preferences and provide  high-protein, high-caloric foods as appropriate.     INTERVENTIONS:  - Monitor oral intake, urinary output, labs, and treatment plans  - Assess nutrition and hydration status and recommend course of action  - Evaluate amount of meals eaten  - Assist patient with eating if necessary   - Allow adequate time for meals  - Recommend/ encourage appropriate diets, oral nutritional supplements, and vitamin/mineral supplements  - Order, calculate, and assess calorie counts as needed  - Recommend, monitor, and adjust tube feedings and TPN/PPN based on assessed needs  - Assess need for intravenous fluids  - Provide specific nutrition/hydration education as appropriate  - Include patient/family/caregiver in decisions related to nutrition  Outcome: Progressing     Problem: NEUROSENSORY - ADULT  Goal: Achieves stable or improved neurological status  Description: INTERVENTIONS  - Monitor and report changes in neurological status  - Monitor vital signs such as temperature, blood pressure, glucose, and any other labs ordered   - Initiate measures to prevent increased intracranial pressure  - Monitor for seizure activity and implement precautions if appropriate      Outcome: Progressing  Goal: Achieves maximal functionality and self care  Description: INTERVENTIONS  - Monitor swallowing and airway patency with patient fatigue and changes in neurological status  - Encourage and assist patient to increase activity and self care.   - Encourage visually impaired, hearing impaired and aphasic patients to use assistive/communication devices  Outcome: Progressing     Problem: CARDIOVASCULAR - ADULT  Goal: Maintains optimal cardiac output and hemodynamic stability  Description: INTERVENTIONS:  - Monitor I/O, vital signs and rhythm  - Monitor for S/S and trends of decreased cardiac output  - Administer and titrate ordered vasoactive medications to optimize hemodynamic stability  - Assess quality of pulses, skin color and temperature  - Assess  for signs of decreased coronary artery perfusion  - Instruct patient to report change in severity of symptoms  Outcome: Progressing  Goal: Absence of cardiac dysrhythmias or at baseline rhythm  Description: INTERVENTIONS:  - Continuous cardiac monitoring, vital signs, obtain 12 lead EKG if ordered  - Administer antiarrhythmic and heart rate control medications as ordered  - Monitor electrolytes and administer replacement therapy as ordered  Outcome: Progressing     Problem: RESPIRATORY - ADULT  Goal: Achieves optimal ventilation and oxygenation  Description: INTERVENTIONS:  - Assess for changes in respiratory status  - Assess for changes in mentation and behavior  - Position to facilitate oxygenation and minimize respiratory effort  - Oxygen administered by appropriate delivery if ordered  - Initiate smoking cessation education as indicated  - Encourage broncho-pulmonary hygiene including cough, deep breathe, Incentive Spirometry  - Assess the need for suctioning and aspirate as needed  - Assess and instruct to report SOB or any respiratory difficulty  - Respiratory Therapy support as indicated  Outcome: Progressing     Problem: METABOLIC, FLUID AND ELECTROLYTES - ADULT  Goal: Electrolytes maintained within normal limits  Description: INTERVENTIONS:  - Monitor labs and assess patient for signs and symptoms of electrolyte imbalances  - Administer electrolyte replacement as ordered  - Monitor response to electrolyte replacements, including repeat lab results as appropriate  - Instruct patient on fluid and nutrition as appropriate  Outcome: Progressing     Problem: HEMATOLOGIC - ADULT  Goal: Maintains hematologic stability  Description: INTERVENTIONS  - Assess for signs and symptoms of bleeding or hemorrhage  - Monitor labs  - Administer supportive blood products/factors as ordered and appropriate  Outcome: Progressing

## 2025-06-04 NOTE — PHYSICAL THERAPY NOTE
PT EVALUATION     06/04/25 1140   PT Last Visit   PT Visit Date 06/04/25   Note Type   Note type Evaluation   Bed Mobility   Supine to Sit 7  Independent   Sit to Supine 7  Independent   Transfers   Sit to Stand 7  Independent   Stand to Sit 7  Independent   Ambulation/Elevation   Gait Assistance 7  Independent   Assistive Device None   Distance 250 feet with change in direction   Stair Management Assistance 7  Independent   Stair Management Technique No rails;Alternating pattern   Number of Stairs 10   Balance   Static Sitting Good   Dynamic Sitting Good   Static Standing Good   Dynamic Standing Good   Ambulatory Good   Activity Tolerance   Activity Tolerance Patient tolerated treatment well   Nurse Made Aware yes   Assessment   Prognosis Good   Assessment Patient seen for Physical Therapy evaluation. Patient admitted with Facial paresthesia.  Comorbidities affecting patient's physical performance include: HTN, HLD, BPH, pancreatic neuroendocrine tumor s/p Whipple procedure .  Patient now presents as independent with ADLs and mobility and symptoms have resolved.No skilled PT/OT needs. Personal factors affecting patient at time of initial evaluation include: no new functional deficits. Prior to admission, patient was independent with functional mobility without assistive device, independent with ADLS, independent with IADLS, ambulating household distance, ambulating community distances, and home with family assist.  Please find objective findings from Physical Therapy assessment regarding body systems outlined above with impairments and limitations including no new functional deficits.  The Barthel Index was used as a functional outcome tool presenting with a score of Barthel Index Score: 100 today indicating no limitations of functional mobility and ADLS.  Patient's clinical presentation is currently stable  as seen in patient's presentation of no new functional deficits. As demonstrated by objective findings,  the assigned level of complexity for this evaluation is low.The patient's AM-PAC Basic Mobility Inpatient Short Form Raw Score is 24. A Raw score of greater than 16 suggests the patient may benefit from discharge to home. Please also refer to the recommendation of the Physical Therapist for safe discharge planning.   Goals   Patient Goals to go home today   STG Expiration Date   (na)   LTG Expiration Date   (na)   Discharge Recommendation   Rehab Resource Intensity Level, PT No post-acute rehabilitation needs   AM-PAC Basic Mobility Inpatient   Turning in Flat Bed Without Bedrails 4   Lying on Back to Sitting on Edge of Flat Bed Without Bedrails 4   Moving Bed to Chair 4   Standing Up From Chair Using Arms 4   Walk in Room 4   Climb 3-5 Stairs With Railing 4   Basic Mobility Inpatient Raw Score 24   Basic Mobility Standardized Score 57.68   [facility] Highest Level Of Mobility   JH-HLM Goal 8: Walk 250 feet or more   JH-HLM Achieved 8: Walk 250 feet ot more   Barthel Index   Feeding 10   Bathing 5   Grooming Score 5   Dressing Score 10   Bladder Score 10   Bowels Score 10   Toilet Use Score 10   Transfers (Bed/Chair) Score 15   Mobility (Level Surface) Score 15   Stairs Score 10   Barthel Index Score 100   Licensure   [place] License Number  Evelin Julian PT [id number]

## 2025-06-05 ENCOUNTER — TELEPHONE (OUTPATIENT)
Age: 80
End: 2025-06-05

## 2025-06-05 ENCOUNTER — TRANSITIONAL CARE MANAGEMENT (OUTPATIENT)
Dept: FAMILY MEDICINE CLINIC | Facility: CLINIC | Age: 80
End: 2025-06-05

## 2025-06-05 NOTE — TELEPHONE ENCOUNTER
1ST ATTEMPT,     VIA [facility]     Thank you,     Nancy PERESU/ ZACHARY LEIGH/ Facial paresthesia     DC- 6/4/2025-  HOME    ---- Message from Sharon Rivas PA-C sent at 6/5/2025  8:49 AM EDT -----  Regarding: HFU    1.    Kevin Schmitz will need follow-up in 8-10 weeks with general neurology team for Other= ATTENDING  in 60 minute appointment.     2.    They will require a cervical spine MRI within 4 weeks.

## 2025-06-06 ENCOUNTER — TELEPHONE (OUTPATIENT)
Age: 80
End: 2025-06-06

## 2025-06-06 NOTE — TELEPHONE ENCOUNTER
Caller: Patient     Doctor: Dr. Small     Reason for call: Pt calling in needing assistance to get set up with [facility].. Pt received order while in the hospital..     Call back#: [phone]

## 2025-06-09 DIAGNOSIS — I10 ESSENTIAL HYPERTENSION: Primary | ICD-10-CM

## 2025-06-09 DIAGNOSIS — G45.9 TIA (TRANSIENT ISCHEMIC ATTACK): ICD-10-CM

## 2025-06-09 DIAGNOSIS — R01.1 CARDIAC MURMUR: ICD-10-CM

## 2025-06-09 NOTE — TELEPHONE ENCOUNTER
The hospitalist did put an order in, but I believe it was the wrong order. I did put new order in. If you want to check to make sure it's correct. Thank you

## 2025-06-09 NOTE — TELEPHONE ENCOUNTER
Caller: Kevin    Doctor: Dr. Small    Reason for call: Patient calling to verify that zio will be mailed to his house. Advised per clinical that zio will be at patient house. He will call us back if he does not feel comfortable placing it himself.     Call back#: [phone]

## 2025-06-12 ENCOUNTER — OFFICE VISIT (OUTPATIENT)
Dept: FAMILY MEDICINE CLINIC | Facility: CLINIC | Age: 80
End: 2025-06-12
Payer: COMMERCIAL

## 2025-06-12 VITALS
SYSTOLIC BLOOD PRESSURE: 152 MMHG | TEMPERATURE: 98.2 F | WEIGHT: 179 LBS | OXYGEN SATURATION: 95 % | DIASTOLIC BLOOD PRESSURE: 74 MMHG | RESPIRATION RATE: 12 BRPM | HEIGHT: 73 IN | HEART RATE: 96 BPM | BODY MASS INDEX: 23.72 KG/M2

## 2025-06-12 DIAGNOSIS — R20.0 LEFT FACIAL NUMBNESS: Primary | ICD-10-CM

## 2025-06-12 DIAGNOSIS — I10 ESSENTIAL HYPERTENSION: ICD-10-CM

## 2025-06-12 DIAGNOSIS — I80.02 THROMBOPHLEBITIS OF LEFT SAPHENOUS VEIN: ICD-10-CM

## 2025-06-12 DIAGNOSIS — D36.7 DERMOID CYST OF ARM, RIGHT: ICD-10-CM

## 2025-06-12 DIAGNOSIS — R60.0 BILATERAL LOWER EXTREMITY EDEMA: ICD-10-CM

## 2025-06-12 DIAGNOSIS — N13.8 BENIGN PROSTATIC HYPERPLASIA WITH URINARY OBSTRUCTION: ICD-10-CM

## 2025-06-12 DIAGNOSIS — Z90.81 STATUS POST SPLENECTOMY: ICD-10-CM

## 2025-06-12 DIAGNOSIS — K86.89 PANCREATIC MASS: ICD-10-CM

## 2025-06-12 DIAGNOSIS — E87.8 ELECTROLYTE ABNORMALITY: ICD-10-CM

## 2025-06-12 DIAGNOSIS — N40.1 BENIGN PROSTATIC HYPERPLASIA WITH URINARY OBSTRUCTION: ICD-10-CM

## 2025-06-12 DIAGNOSIS — Z13.0 SCREENING, IRON DEFICIENCY ANEMIA: ICD-10-CM

## 2025-06-12 PROCEDURE — 99495 TRANSJ CARE MGMT MOD F2F 14D: CPT | Performed by: STUDENT IN AN ORGANIZED HEALTH CARE EDUCATION/TRAINING PROGRAM

## 2025-06-12 RX ORDER — TORSEMIDE 5 MG/1
5 TABLET ORAL DAILY
Qty: 30 TABLET | Refills: 0 | Status: SHIPPED | OUTPATIENT
Start: 2025-06-12

## 2025-06-12 NOTE — PROGRESS NOTES
Transition of Care Visit:  Name: Kevin Schmitz      : 1945      MRN: [id number]  Encounter Provider: Kashmir Oleary DO  Encounter Date: 2025   Encounter department: [facility]    Assessment & Plan  Left facial numbness  Hospitalization secondary to facial numbness/paresthesia, admitted under stroke workup, overall unremarkable, imaging reviewed.  Patient is currently on aspirin, statin therapy.  MRI cervical spine without contrast outpatient, Zio patch, neurology and cardiology follow-up plan.  Recheck CBC, CMP, Lyme studies  Orders:  •  Lyme Total AB W Reflex to IGM/IGG; Future    Thrombophlebitis of left saphenous vein  Superficial, case was discussed with hematology, no indication for anticoagulation       Essential hypertension  Blood pressure controlled, monitor       Pancreatic mass  S/p Whipple, follows with [facility]       Status post splenectomy         Benign prostatic hyperplasia with urinary obstruction         Electrolyte abnormality    Orders:  •  Comprehensive metabolic panel; Future    Screening, iron deficiency anemia    Orders:  •  CBC and differential; Future    Bilateral lower extremity edema  Echo reviewed, some diastolic dysfunction, given mild bilateral lower extremity edema, trial low-dose Demadex, check blood work on Monday to determine if potassium supplementation appropriate.  Monitor for resolution of symptoms.  Orders:  •  torsemide (DEMADEX) 5 MG tablet; Take 1 tablet (5 mg total) by mouth daily    Dermoid cyst of arm, right    Orders:  •  Ambulatory Referral to General Surgery; Future      Depression Screening and Follow-up Plan: Patient was screened for depression during today's encounter. They screened negative with a PHQ-2 score of 0.          History of Present Illness     Transitional Care Management Review:   Kevin Schmitz is a 79 y.o. male here for TCM follow up.     During the TCM phone call patient stated:  TCM Call  (since 5/29/2025)     Date and time call was made  6/5/2025  1:46 PM    Hospital care reviewed  Records reviewed    Patient was hospitialized at  [facility]    Date of Admission  06/03/25    Date of discharge  06/04/25    Diagnosis  Facial Paresthesia    Disposition  Home    Were the patients medications reviewed and updated  Yes    Current Symptoms  None      TCM Call (since 5/29/2025)     Post hospital issues  None    Scheduled for follow up?  Yes    Patients specialists  Neurologist    Neurologist name  [facility] [place]    Neurologist contact #  [phone]    Referrals needed  None    Did you obtain your prescribed medications  Yes    Do you need help managing your prescriptions or medications  No    Is transportation to your appointment needed  No    I have advised the patient to call PCP with any new or worsening symptoms  SRINIVAS Sanchez    Living Arrangements  Spouse or Significiant other    Support System  Spouse    The type of support provided  Emotional; Financial; Physical    Do you have social support  Yes, as much as I need    Are you recieving home care services  No        Follow-up visit      Review of Systems   Constitutional:  Negative for chills and fever.   HENT:  Negative for ear pain and sore throat.    Eyes:  Negative for pain and visual disturbance.   Respiratory:  Negative for cough and shortness of breath.    Cardiovascular:  Positive for leg swelling. Negative for chest pain and palpitations.   Gastrointestinal:  Negative for abdominal pain and vomiting.   Genitourinary:  Negative for dysuria and hematuria.   Musculoskeletal:  Negative for arthralgias and back pain.   Skin:  Negative for color change and rash.   Neurological:  Negative for seizures and syncope.   Psychiatric/Behavioral:  Negative for agitation, behavioral problems and confusion.    All other systems reviewed and are negative.    Objective   /74 (BP Location: Left arm, Patient Position:  "Sitting, Cuff Size: Standard)   Pulse 96   Temp 98.2 °F (36.8 °C) (Temporal)   Resp 12   Ht 6' 1\" (1.854 m)   Wt 81.2 kg (179 lb)   SpO2 95%   BMI 23.62 kg/m²     Physical Exam  Vitals and nursing note reviewed.   Constitutional:       General: He is not in acute distress.     Appearance: He is well-developed.   HENT:      Head: Normocephalic and atraumatic.     Eyes:      General: No scleral icterus.     Conjunctiva/sclera: Conjunctivae normal.       Cardiovascular:      Rate and Rhythm: Normal rate and regular rhythm.      Pulses: Normal pulses.      Heart sounds: No murmur heard.  Pulmonary:      Effort: Pulmonary effort is normal. No respiratory distress.      Breath sounds: Normal breath sounds.   Abdominal:      General: There is no distension.      Palpations: Abdomen is soft.      Tenderness: There is no abdominal tenderness.     Musculoskeletal:         General: Swelling present. Normal range of motion.      Cervical back: Neck supple.     Skin:     General: Skin is warm and dry.      Capillary Refill: Capillary refill takes less than 2 seconds.      Coloration: Skin is not jaundiced.     Neurological:      General: No focal deficit present.      Mental Status: He is alert and oriented to person, place, and time. Mental status is at baseline.     Psychiatric:         Mood and Affect: Mood normal.         Behavior: Behavior normal.       Medications have been reviewed by provider in current encounter      "

## 2025-06-15 ENCOUNTER — HOSPITAL ENCOUNTER (OUTPATIENT)
Dept: RADIOLOGY | Facility: HOSPITAL | Age: 80
Discharge: HOME | End: 2025-06-15
Attending: PHYSICIAN ASSISTANT
Payer: COMMERCIAL

## 2025-06-15 DIAGNOSIS — R20.2 FACIAL PARESTHESIA: ICD-10-CM

## 2025-06-15 PROCEDURE — 72141 MRI NECK SPINE W/O DYE: CPT

## 2025-06-16 ENCOUNTER — APPOINTMENT (OUTPATIENT)
Dept: LAB | Facility: CLINIC | Age: 80
End: 2025-06-16
Payer: COMMERCIAL

## 2025-06-16 DIAGNOSIS — Z13.0 SCREENING, IRON DEFICIENCY ANEMIA: ICD-10-CM

## 2025-06-16 DIAGNOSIS — E87.8 ELECTROLYTE ABNORMALITY: ICD-10-CM

## 2025-06-16 DIAGNOSIS — R20.0 LEFT FACIAL NUMBNESS: ICD-10-CM

## 2025-06-16 LAB
ALBUMIN SERPL BCG-MCNC: 4.2 G/DL (ref 3.5–5)
ALP SERPL-CCNC: 71 U/L (ref 34–104)
ALT SERPL W P-5'-P-CCNC: 14 U/L (ref 7–52)
ANION GAP SERPL CALCULATED.3IONS-SCNC: 8 MMOL/L (ref 4–13)
AST SERPL W P-5'-P-CCNC: 14 U/L (ref 13–39)
BASOPHILS # BLD AUTO: 0.08 THOUSANDS/ÂΜL (ref 0–0.1)
BASOPHILS NFR BLD AUTO: 2 % (ref 0–1)
BILIRUB SERPL-MCNC: 1.01 MG/DL (ref 0.2–1)
BUN SERPL-MCNC: 17 MG/DL (ref 5–25)
CALCIUM SERPL-MCNC: 8.8 MG/DL (ref 8.4–10.2)
CHLORIDE SERPL-SCNC: 102 MMOL/L (ref 96–108)
CO2 SERPL-SCNC: 29 MMOL/L (ref 21–32)
CREAT SERPL-MCNC: 0.72 MG/DL (ref 0.6–1.3)
EOSINOPHIL # BLD AUTO: 0.22 THOUSAND/ÂΜL (ref 0–0.61)
EOSINOPHIL NFR BLD AUTO: 4 % (ref 0–6)
ERYTHROCYTE [DISTWIDTH] IN BLOOD BY AUTOMATED COUNT: 14 % (ref 11.6–15.1)
GFR SERPL CREATININE-BSD FRML MDRD: 89 ML/MIN/1.73SQ M
GLUCOSE P FAST SERPL-MCNC: 111 MG/DL (ref 65–99)
HCT VFR BLD AUTO: 42.2 % (ref 36.5–49.3)
HGB BLD-MCNC: 14.2 G/DL (ref 12–17)
IMM GRANULOCYTES # BLD AUTO: 0.01 THOUSAND/UL (ref 0–0.2)
IMM GRANULOCYTES NFR BLD AUTO: 0 % (ref 0–2)
LYMPHOCYTES # BLD AUTO: 1.66 THOUSANDS/ÂΜL (ref 0.6–4.47)
LYMPHOCYTES NFR BLD AUTO: 32 % (ref 14–44)
MCH RBC QN AUTO: 31.4 PG (ref 26.8–34.3)
MCHC RBC AUTO-ENTMCNC: 33.6 G/DL (ref 31.4–37.4)
MCV RBC AUTO: 93 FL (ref 82–98)
MONOCYTES # BLD AUTO: 0.9 THOUSAND/ÂΜL (ref 0.17–1.22)
MONOCYTES NFR BLD AUTO: 17 % (ref 4–12)
NEUTROPHILS # BLD AUTO: 2.36 THOUSANDS/ÂΜL (ref 1.85–7.62)
NEUTS SEG NFR BLD AUTO: 45 % (ref 43–75)
NRBC BLD AUTO-RTO: 0 /100 WBCS
PLATELET # BLD AUTO: 275 THOUSANDS/UL (ref 149–390)
PMV BLD AUTO: 10.4 FL (ref 8.9–12.7)
POTASSIUM SERPL-SCNC: 4.3 MMOL/L (ref 3.5–5.3)
PROT SERPL-MCNC: 6.7 G/DL (ref 6.4–8.4)
RBC # BLD AUTO: 4.52 MILLION/UL (ref 3.88–5.62)
SODIUM SERPL-SCNC: 139 MMOL/L (ref 135–147)
WBC # BLD AUTO: 5.23 THOUSAND/UL (ref 4.31–10.16)

## 2025-06-16 PROCEDURE — 86618 LYME DISEASE ANTIBODY: CPT

## 2025-06-16 PROCEDURE — 86617 LYME DISEASE ANTIBODY: CPT

## 2025-06-16 PROCEDURE — 85025 COMPLETE CBC W/AUTO DIFF WBC: CPT

## 2025-06-16 PROCEDURE — 80053 COMPREHEN METABOLIC PANEL: CPT

## 2025-06-16 PROCEDURE — 36415 COLL VENOUS BLD VENIPUNCTURE: CPT

## 2025-06-17 LAB
B BURGDOR IGG SERPL QL IA: POSITIVE
B BURGDOR IGG+IGM SER QL IA: POSITIVE
B BURGDOR IGM SERPL QL IA: NEGATIVE

## 2025-06-19 ENCOUNTER — RESULTS FOLLOW-UP (OUTPATIENT)
Dept: FAMILY MEDICINE CLINIC | Facility: CLINIC | Age: 80
End: 2025-06-19

## 2025-07-08 DIAGNOSIS — R60.0 BILATERAL LOWER EXTREMITY EDEMA: ICD-10-CM

## 2025-07-10 ENCOUNTER — TELEPHONE (OUTPATIENT)
Dept: CARDIOLOGY CLINIC | Facility: CLINIC | Age: 80
End: 2025-07-10

## 2025-07-10 DIAGNOSIS — R01.1 CARDIAC MURMUR: Primary | ICD-10-CM

## 2025-07-10 DIAGNOSIS — R55 SYNCOPE AND COLLAPSE: ICD-10-CM

## 2025-07-10 DIAGNOSIS — R55 VASOVAGAL SYNCOPE: ICD-10-CM

## 2025-07-10 DIAGNOSIS — I10 ESSENTIAL HYPERTENSION: ICD-10-CM

## 2025-07-10 DIAGNOSIS — R01.1 CARDIAC MURMUR: ICD-10-CM

## 2025-07-10 DIAGNOSIS — E78.00 HYPERCHOLESTEROLEMIA: ICD-10-CM

## 2025-07-10 DIAGNOSIS — I10 ESSENTIAL HYPERTENSION: Primary | ICD-10-CM

## 2025-07-10 RX ORDER — TORSEMIDE 5 MG/1
5 TABLET ORAL DAILY
Qty: 30 TABLET | Refills: 5 | Status: SHIPPED | OUTPATIENT
Start: 2025-07-10

## 2025-07-10 NOTE — TELEPHONE ENCOUNTER
Order for second monitor sent company. You may want to follow up tomorrow to make sure it was sent

## 2025-07-10 NOTE — TELEPHONE ENCOUNTER
Spoke with patient's Wife-The Zio Monitor patient is wearing is due to come off on 7/15/25 per Spouse.  However, Zio Patch was ordered for 30 days but each patch can only be worn 14 days per cycle. Explained to patient.  Patient expressed verbal understanding.

## 2025-07-18 ENCOUNTER — RESULTS FOLLOW-UP (OUTPATIENT)
Dept: CARDIOLOGY CLINIC | Facility: CLINIC | Age: 80
End: 2025-07-18

## 2025-07-18 NOTE — TELEPHONE ENCOUNTER
----- Message from DOUGIE Waite sent at 7/18/2025 11:58 AM EDT -----  Monitor ordered as patient was in hospital with stroke like symptoms.  No Atrial fibrillation, Short episodes of PAT and PVCs.  ----- Message -----  From: Andrei Small MD  Sent: 7/18/2025  11:53 AM EDT  To: DOUGIE Waite

## 2025-07-21 DIAGNOSIS — I10 ESSENTIAL HYPERTENSION: ICD-10-CM

## 2025-07-24 RX ORDER — CARVEDILOL 6.25 MG/1
12.5 TABLET ORAL 2 TIMES DAILY
Qty: 360 TABLET | Refills: 1 | Status: SHIPPED | OUTPATIENT
Start: 2025-07-24

## 2025-07-31 ENCOUNTER — OFFICE VISIT (OUTPATIENT)
Dept: UROLOGY | Facility: CLINIC | Age: 80
End: 2025-07-31

## 2025-07-31 VITALS
SYSTOLIC BLOOD PRESSURE: 130 MMHG | OXYGEN SATURATION: 99 % | DIASTOLIC BLOOD PRESSURE: 82 MMHG | WEIGHT: 176 LBS | BODY MASS INDEX: 23.33 KG/M2 | HEART RATE: 62 BPM | HEIGHT: 73 IN

## 2025-07-31 DIAGNOSIS — R97.20 ELEVATED PSA: ICD-10-CM

## 2025-07-31 DIAGNOSIS — N40.1 BENIGN PROSTATIC HYPERPLASIA WITH URINARY FREQUENCY: Primary | ICD-10-CM

## 2025-07-31 DIAGNOSIS — R35.0 BENIGN PROSTATIC HYPERPLASIA WITH URINARY FREQUENCY: Primary | ICD-10-CM

## 2025-08-06 ENCOUNTER — RESULTS FOLLOW-UP (OUTPATIENT)
Dept: CARDIOLOGY CLINIC | Facility: CLINIC | Age: 80
End: 2025-08-06

## (undated) DEVICE — CUTTING LOOP, BIPOLAR, 24/26 FR.: Brand: N.A.

## (undated) DEVICE — POV-IOD SOLUTION 4OZ BT

## (undated) DEVICE — STRAP FOLEY CATH LEG 1545 9

## (undated) DEVICE — STANDARD SURGICAL GOWN, L: Brand: CONVERTORS

## (undated) DEVICE — POUCH URO CATCHER II STERILE

## (undated) DEVICE — BAG URINE DRAINAGE 4000ML CONTINUOUS IRR

## (undated) DEVICE — LUBRICANT JELLY SURGILUBE TUBE 4OZ FLIP TOP

## (undated) DEVICE — Device

## (undated) DEVICE — CYSTOSCOPY PACK: Brand: CONVERTORS

## (undated) DEVICE — GLOVE SRG BIOGEL 7.5

## (undated) DEVICE — CYSTO TUBING TUR Y IRRIGATION